# Patient Record
Sex: MALE | Race: BLACK OR AFRICAN AMERICAN | Employment: FULL TIME | URBAN - METROPOLITAN AREA
[De-identification: names, ages, dates, MRNs, and addresses within clinical notes are randomized per-mention and may not be internally consistent; named-entity substitution may affect disease eponyms.]

---

## 2018-12-31 ENCOUNTER — HOSPITAL ENCOUNTER (EMERGENCY)
Age: 48
Discharge: HOME | End: 2018-12-31
Attending: EMERGENCY MEDICINE
Payer: COMMERCIAL

## 2018-12-31 VITALS
SYSTOLIC BLOOD PRESSURE: 176 MMHG | DIASTOLIC BLOOD PRESSURE: 114 MMHG | WEIGHT: 170 LBS | TEMPERATURE: 97.8 F | HEART RATE: 74 BPM | HEIGHT: 69 IN | BODY MASS INDEX: 25.18 KG/M2 | RESPIRATION RATE: 18 BRPM | OXYGEN SATURATION: 97 %

## 2018-12-31 DIAGNOSIS — R04.0 EPISTAXIS: Primary | ICD-10-CM

## 2018-12-31 PROCEDURE — 77030013848 HC PK NSL EPITAX S&N -B

## 2018-12-31 PROCEDURE — 75810000284 HC CNTRL NASAL HEMORHRAGE SIMPLE: Performed by: NURSE PRACTITIONER

## 2018-12-31 PROCEDURE — 99284 EMERGENCY DEPT VISIT MOD MDM: CPT | Performed by: NURSE PRACTITIONER

## 2018-12-31 PROCEDURE — 74011250637 HC RX REV CODE- 250/637: Performed by: NURSE PRACTITIONER

## 2018-12-31 PROCEDURE — 74011000250 HC RX REV CODE- 250: Performed by: NURSE PRACTITIONER

## 2018-12-31 RX ORDER — CLONIDINE HYDROCHLORIDE 0.1 MG/1
0.1 TABLET ORAL
Status: COMPLETED | OUTPATIENT
Start: 2018-12-31 | End: 2018-12-31

## 2018-12-31 RX ORDER — HYDROCODONE BITARTRATE AND ACETAMINOPHEN 5; 325 MG/1; MG/1
1 TABLET ORAL
Qty: 12 TAB | Refills: 0 | Status: SHIPPED | OUTPATIENT
Start: 2018-12-31

## 2018-12-31 RX ORDER — HYDROCODONE BITARTRATE AND ACETAMINOPHEN 5; 325 MG/1; MG/1
1 TABLET ORAL
Status: COMPLETED | OUTPATIENT
Start: 2018-12-31 | End: 2018-12-31

## 2018-12-31 RX ORDER — PRAVASTATIN SODIUM 10 MG/1
TABLET ORAL
COMMUNITY

## 2018-12-31 RX ORDER — OXYMETAZOLINE HCL 0.05 %
2 SPRAY, NON-AEROSOL (ML) NASAL
Status: COMPLETED | OUTPATIENT
Start: 2018-12-31 | End: 2018-12-31

## 2018-12-31 RX ORDER — HYDROCHLOROTHIAZIDE 12.5 MG/1
12.5 CAPSULE ORAL DAILY
COMMUNITY

## 2018-12-31 RX ORDER — CARVEDILOL 25 MG/1
25 TABLET ORAL 2 TIMES DAILY WITH MEALS
COMMUNITY

## 2018-12-31 RX ORDER — FUROSEMIDE 40 MG/1
40 TABLET ORAL DAILY
COMMUNITY

## 2018-12-31 RX ORDER — GLUCOSAMINE SULFATE 1500 MG
POWDER IN PACKET (EA) ORAL DAILY
COMMUNITY

## 2018-12-31 RX ORDER — CEPHALEXIN 500 MG/1
500 CAPSULE ORAL 3 TIMES DAILY
Qty: 21 CAP | Refills: 0 | Status: SHIPPED | OUTPATIENT
Start: 2018-12-31 | End: 2019-01-07

## 2018-12-31 RX ADMIN — CLONIDINE HYDROCHLORIDE 0.1 MG: 0.1 TABLET ORAL at 13:26

## 2018-12-31 RX ADMIN — COCAINE HYDROCHLORIDE: 40 SOLUTION TOPICAL at 13:25

## 2018-12-31 RX ADMIN — HYDROCODONE BITARTRATE AND ACETAMINOPHEN 1 TABLET: 5; 325 TABLET ORAL at 14:17

## 2018-12-31 RX ADMIN — OXYMETAZOLINE HYDROCHLORIDE 2 SPRAY: 5 SPRAY NASAL at 13:21

## 2018-12-31 NOTE — ED NOTES
I have reviewed discharge instructions with the patient. The patient verbalized understanding. Patient left ED via Discharge Method: ambulatory to Home with his wife and daughter. Opportunity for questions and clarification provided. Patient given 2 scripts. To continue your aftercare when you leave the hospital, you may receive an automated call from our care team to check in on how you are doing. This is a free service and part of our promise to provide the best care and service to meet your aftercare needs.  If you have questions, or wish to unsubscribe from this service please call [phone]. Thank you for Choosing our [facility] Emergency Department.

## 2018-12-31 NOTE — ED PROVIDER NOTES
Patient states nose bleed this morning and his blood pressure was elevated. He states he got the bleeding to stop and took his blood pressure medication. He states approx 3-4 hours later he started having another nose bleed. He denies headache, changes to his vision, cough, congestion, blurry vision, and other acute symptoms. The history is provided by the patient. Past Medical History:  
Diagnosis Date  Hypertension History reviewed. No pertinent surgical history. History reviewed. No pertinent family history. Social History Socioeconomic History  Marital status:  Spouse name: Not on file  Number of children: Not on file  Years of education: Not on file  Highest education level: Not on file Social Needs  Financial resource strain: Not on file  Food insecurity - worry: Not on file  Food insecurity - inability: Not on file  Transportation needs - medical: Not on file  Transportation needs - non-medical: Not on file Occupational History  Not on file Tobacco Use  Smoking status: Current Every Day Smoker Substance and Sexual Activity  Alcohol use: Yes Comment: occ  Drug use: No  
 Sexual activity: Not on file Other Topics Concern  Not on file Social History Narrative  Not on file ALLERGIES: Patient has no known allergies. Review of Systems HENT: Positive for nosebleeds. Vitals:  
 12/31/18 1226 BP: (!) 163/114 Pulse: 83 Resp: 16 Temp: 98 °F (36.7 °C) SpO2: 98% Weight: 77.1 kg (170 lb) Height: 5' 9\" (1.753 m) Physical Exam  
Constitutional: He is oriented to person, place, and time. He appears well-developed and well-nourished. No distress. HENT:  
Head: Normocephalic and atraumatic. Nose: Epistaxis is observed. Neck: Normal range of motion. Neck supple. Cardiovascular: Normal rate and regular rhythm.   
Pulmonary/Chest: Effort normal and breath sounds normal.  
 Neurological: He is alert and oriented to person, place, and time. GCS eye subscore is 4. GCS verbal subscore is 5. GCS motor subscore is 6. Skin: Skin is warm and dry. He is not diaphoretic. Nursing note and vitals reviewed. 1:15 PM-discussed patient with Dr. Cuco Whitaker. MDM Number of Diagnoses or Management Options Diagnosis management comments: Discussed patient with Dr. Cuco Whitaker and she assisted with rhino rocket placement. No bleeding noted at discharge. Patient given po norco for pain while in the department. Patient given prescriptions for keflex and norco. Patient referred to ENT for rocker removal.  
 
  
Amount and/or Complexity of Data Reviewed Clinical lab tests: ordered and reviewed Discuss the patient with other providers: yes (Cuco Whitaker. ) Patient Progress Patient progress: stable Epistaxis Management Date/Time: 12/31/2018 1:54 PM 
Performed by: LAURA Reinoso Authorized by: LAURA Reinoso Consent:  
  Consent obtained:  Verbal 
  Consent given by:  Patient Risks discussed:  Bleeding Alternatives discussed:  No treatment Anesthesia (see MAR for exact dosages): Anesthesia method:  Topical application Topical anesthetic:  Cocaine Procedure details:  
  Treatment site:  L posterior Treatment method:  Nasal balloon Treatment episode: initial   
Post-procedure details:  
  Assessment:  Bleeding stopped Patient tolerance of procedure: Tolerated well, no immediate complications Comments:  
   4 cc in ballon 7.5mm rapid rhino assisted by Dr. Cuco Whitaker.

## 2018-12-31 NOTE — DISCHARGE INSTRUCTIONS
Leave the [employer] in place until you see ENT. You need to call Dr. Jaqueline Lindsay office to schedule an appointment for in the next 2-3 days to have it removed. You can return to the Emergency Department to have it removed if you are unable to get an appointment. Return sooner if you start bleeding again or for any additional concerns you may have.

## 2018-12-31 NOTE — ED TRIAGE NOTES
Pt states he had a nose bleed this morning and BP was elevated. Pt states he has taken BP medication this morning and nose is not bleeding at this time, but BP is still elevated.

## 2019-01-01 ENCOUNTER — HOSPITAL ENCOUNTER (EMERGENCY)
Age: 49
Discharge: HOME | End: 2019-01-01
Attending: EMERGENCY MEDICINE
Payer: COMMERCIAL

## 2019-01-01 VITALS
RESPIRATION RATE: 16 BRPM | DIASTOLIC BLOOD PRESSURE: 89 MMHG | WEIGHT: 170 LBS | HEART RATE: 67 BPM | BODY MASS INDEX: 25.18 KG/M2 | SYSTOLIC BLOOD PRESSURE: 139 MMHG | TEMPERATURE: 98.2 F | HEIGHT: 69 IN | OXYGEN SATURATION: 97 %

## 2019-01-01 DIAGNOSIS — R04.0 EPISTAXIS: Primary | ICD-10-CM

## 2019-01-01 DIAGNOSIS — I10 ESSENTIAL HYPERTENSION: ICD-10-CM

## 2019-01-01 LAB
ANION GAP SERPL CALC-SCNC: 9 MMOL/L
BASOPHILS # BLD: 0 K/UL (ref 0–0.2)
BASOPHILS NFR BLD: 1 % (ref 0–2)
BUN SERPL-MCNC: 17 MG/DL (ref 6–23)
CALCIUM SERPL-MCNC: 9.6 MG/DL (ref 8.3–10.4)
CHLORIDE SERPL-SCNC: 103 MMOL/L (ref 98–107)
CO2 SERPL-SCNC: 25 MMOL/L (ref 21–32)
CREAT SERPL-MCNC: 1.25 MG/DL (ref 0.8–1.5)
DIFFERENTIAL METHOD BLD: NORMAL
EOSINOPHIL # BLD: 0.2 K/UL (ref 0–0.8)
EOSINOPHIL NFR BLD: 3 % (ref 0.5–7.8)
ERYTHROCYTE [DISTWIDTH] IN BLOOD BY AUTOMATED COUNT: 13.2 % (ref 11.9–14.6)
GLUCOSE SERPL-MCNC: 113 MG/DL (ref 65–100)
HCT VFR BLD AUTO: 48.2 % (ref 41.1–50.3)
HGB BLD-MCNC: 15.9 G/DL (ref 13.6–17.2)
IMM GRANULOCYTES # BLD: 0 K/UL (ref 0–0.5)
IMM GRANULOCYTES NFR BLD AUTO: 0 % (ref 0–5)
LYMPHOCYTES # BLD: 2.3 K/UL (ref 0.5–4.6)
LYMPHOCYTES NFR BLD: 31 % (ref 13–44)
MCH RBC QN AUTO: 28.9 PG (ref 26.1–32.9)
MCHC RBC AUTO-ENTMCNC: 33 G/DL (ref 31.4–35)
MCV RBC AUTO: 87.6 FL (ref 79.6–97.8)
MONOCYTES # BLD: 0.5 K/UL (ref 0.1–1.3)
MONOCYTES NFR BLD: 7 % (ref 4–12)
NEUTS SEG # BLD: 4.4 K/UL (ref 1.7–8.2)
NEUTS SEG NFR BLD: 59 % (ref 43–78)
NRBC # BLD: 0 K/UL (ref 0–0.2)
PLATELET # BLD AUTO: 258 K/UL (ref 150–450)
PMV BLD AUTO: 10.1 FL (ref 9.4–12.3)
POTASSIUM SERPL-SCNC: 3.6 MMOL/L (ref 3.5–5.1)
RBC # BLD AUTO: 5.5 M/UL (ref 4.23–5.6)
SODIUM SERPL-SCNC: 137 MMOL/L (ref 136–145)
WBC # BLD AUTO: 7.5 K/UL (ref 4.3–11.1)

## 2019-01-01 PROCEDURE — 80048 BASIC METABOLIC PNL TOTAL CA: CPT

## 2019-01-01 PROCEDURE — 74011250636 HC RX REV CODE- 250/636: Performed by: EMERGENCY MEDICINE

## 2019-01-01 PROCEDURE — 85025 COMPLETE CBC W/AUTO DIFF WBC: CPT

## 2019-01-01 PROCEDURE — 99284 EMERGENCY DEPT VISIT MOD MDM: CPT | Performed by: EMERGENCY MEDICINE

## 2019-01-01 PROCEDURE — 96374 THER/PROPH/DIAG INJ IV PUSH: CPT | Performed by: EMERGENCY MEDICINE

## 2019-01-01 RX ORDER — LABETALOL HCL 20 MG/4 ML
20 SYRINGE (ML) INTRAVENOUS ONCE
Status: COMPLETED | OUTPATIENT
Start: 2019-01-01 | End: 2019-01-01

## 2019-01-01 RX ORDER — CLONIDINE HYDROCHLORIDE 0.1 MG/1
0.1 TABLET ORAL 2 TIMES DAILY
Qty: 20 TAB | Refills: 0 | Status: SHIPPED | OUTPATIENT
Start: 2019-01-01

## 2019-01-01 RX ADMIN — LABETALOL HYDROCHLORIDE 20 MG: 5 INJECTION, SOLUTION INTRAVENOUS at 10:49

## 2019-01-01 NOTE — ED TRIAGE NOTES
Pt was seen here yesterday for a nosebleed. Has Rocket in left nostril and is now having bleeding form right nostril.  
 
Chacha Salinas RN

## 2019-01-01 NOTE — DISCHARGE INSTRUCTIONS
High Blood Pressure: Care Instructions  Your Care Instructions    If your blood pressure is usually above 130/80, you have high blood pressure, or hypertension. That means the top number is 130 or higher or the bottom number is 80 or higher, or both. Despite what a lot of people think, high blood pressure usually doesn't cause headaches or make you feel dizzy or lightheaded. It usually has no symptoms. But it does increase your risk for heart attack, stroke, and kidney or eye damage. The higher your blood pressure, the more your risk increases. Your doctor will give you a goal for your blood pressure. Your goal will be based on your health and your age. Lifestyle changes, such as eating healthy and being active, are always important to help lower blood pressure. You might also take medicine to reach your blood pressure goal.  Follow-up care is a key part of your treatment and safety. Be sure to make and go to all appointments, and call your doctor if you are having problems. It's also a good idea to know your test results and keep a list of the medicines you take. How can you care for yourself at home? Medical treatment  · If you stop taking your medicine, your blood pressure will go back up. You may take one or more types of medicine to lower your blood pressure. Be safe with medicines. Take your medicine exactly as prescribed. Call your doctor if you think you are having a problem with your medicine. · Talk to your doctor before you start taking aspirin every day. Aspirin can help certain people lower their risk of a heart attack or stroke. But taking aspirin isn't right for everyone, because it can cause serious bleeding. · See your doctor regularly. You may need to see the doctor more often at first or until your blood pressure comes down. · If you are taking blood pressure medicine, talk to your doctor before you take decongestants or anti-inflammatory medicine, such as ibuprofen.  Some of these medicines can raise blood pressure. · Learn how to check your blood pressure at home. Lifestyle changes  · Stay at a healthy weight. This is especially important if you put on weight around the waist. Losing even 10 pounds can help you lower your blood pressure. · If your doctor recommends it, get more exercise. Walking is a good choice. Bit by bit, increase the amount you walk every day. Try for at least 30 minutes on most days of the week. You also may want to swim, bike, or do other activities. · Avoid or limit alcohol. Talk to your doctor about whether you can drink any alcohol. · Try to limit how much sodium you eat to less than 2,300 milligrams (mg) a day. Your doctor may ask you to try to eat less than 1,500 mg a day. · Eat plenty of fruits (such as bananas and oranges), vegetables, legumes, whole grains, and low-fat dairy products. · Lower the amount of saturated fat in your diet. Saturated fat is found in animal products such as milk, cheese, and meat. Limiting these foods may help you lose weight and also lower your risk for heart disease. · Do not smoke. Smoking increases your risk for heart attack and stroke. If you need help quitting, talk to your doctor about stop-smoking programs and medicines. These can increase your chances of quitting for good. When should you call for help? Call 911 anytime you think you may need emergency care. This may mean having symptoms that suggest that your blood pressure is causing a serious heart or blood vessel problem. Your blood pressure may be over 180/120.   For example, call 911 if:    · You have symptoms of a heart attack. These may include:  ? Chest pain or pressure, or a strange feeling in the chest.  ? Sweating. ? Shortness of breath. ? Nausea or vomiting. ? Pain, pressure, or a strange feeling in the back, neck, jaw, or upper belly or in one or both shoulders or arms. ? Lightheadedness or sudden weakness.   ? A fast or irregular heartbeat.     · You have symptoms of a stroke. These may include:  ? Sudden numbness, tingling, weakness, or loss of movement in your face, arm, or leg, especially on only one side of your body. ? Sudden vision changes. ? Sudden trouble speaking. ? Sudden confusion or trouble understanding simple statements. ? Sudden problems with walking or balance. ? A sudden, severe headache that is different from past headaches.     · You have severe back or belly pain.    Do not wait until your blood pressure comes down on its own. Get help right away.   Call your doctor now or seek immediate care if:    · Your blood pressure is much higher than normal (such as 180/120 or higher), but you don't have symptoms.     · You think high blood pressure is causing symptoms, such as:  ? Severe headache.  ? Blurry vision.    Watch closely for changes in your health, and be sure to contact your doctor if:    · Your blood pressure measures higher than your doctor recommends at least 2 times. That means the top number is higher or the bottom number is higher, or both.     · You think you may be having side effects from your blood pressure medicine. Where can you learn more? Go to http://jade-mikki.info/. Enter X731 in the search box to learn more about \"High Blood Pressure: Care Instructions. \"  Current as of: December 6, 2017  Content Version: 11.8  © 9941-0638 [employer]. Care instructions adapted under license by [employer] (which disclaims liability or warranty for this information). If you have questions about a medical condition or this instruction, always ask your healthcare professional. [place] [address] any warranty or liability for your use of this information. Nasal Packing: Care Instructions  Your Care Instructions  After a nose injury or surgery, gauze is packed high up into the nose. It soaks up fluids that drain from the nose, such as blood.  The doctor may change the gauze. Or he or she may leave it in place for a few days. Your face may look puffy. The skin near your eyes may be bruised. This may last for many days, but it will fade over time. Follow-up care is a key part of your treatment and safety. Be sure to make and go to all appointments, and call your doctor if you are having problems. It's also a good idea to know your test results and keep a list of the medicines you take. How can you care for yourself at home?    · Follow your doctor's advice for taking care of the packing. Your doctor may want to take it out at his or her office. Activity    · Avoid strenuous activities for 1 week or until your doctor says it is okay. These include bicycle riding, jogging, weight lifting, and aerobic exercise.     · You may drive when you are no longer taking prescription pain pills and feel up to it. Medicines    · Do not take aspirin, medicines that contain aspirin, or anti-inflammatory medicines such as ibuprofen (Advil, Motrin) or naproxen (Aleve) for 3 weeks after surgery unless your doctor says it is okay.     · Take pain medicines exactly as directed. ? If the doctor gave you a prescription medicine for pain, take it as prescribed.     · If your doctor prescribed antibiotics, take them as directed. Do not stop taking them just because you feel better. You need to take the full course of antibiotics.     · If you think your pain medicine is making you sick to your stomach:  ? Take your medicine after meals (unless your doctor has told you not to). ? Ask your doctor for a different pain medicine. Other instructions    · Do not blow your nose for 1 week after surgery.     · Do not put anything into your nose.     · If you must sneeze, open your mouth and sneeze naturally.     · After the packing is removed, use saline (saltwater) nasal washes to help keep your nasal passages open. This will wash out mucus and bacteria.  You can buy saline nose drops at a grocery store or drugstore. Or you can make your own at home. Add 1 teaspoon of salt and 1 teaspoon of baking soda to 2 cups of distilled water. If you make your own, fill a bulb syringe with the solution. Put the tip into your nostril, and squeeze gently. Mickiel Sickle your nose. When should you call for help? Call 911 anytime you think you may need emergency care. For example, call if:    · You passed out (lost consciousness).     · You have trouble breathing.     · You have sudden chest pain and shortness of breath, or you cough up blood.    Call your doctor now or seek immediate medical care if:    · You have symptoms of infection, such as:  ? Increased pain, swelling, warmth, or redness. ? Red streaks leading from the area. ? Pus draining from the area. ? A fever.     · You seem to be getting sicker.     · You have new pain or the pain gets worse.     · You have bleeding through the nasal packing that is not slowing.    Watch closely for changes in your health, and be sure to contact your doctor if:    · You do not get better as expected. Where can you learn more? Go to http://ajde-mikki.info/. Enter A097 in the search box to learn more about \"Nasal Packing: Care Instructions. \"  Current as of: March 28, 2018  Content Version: 11.8  © 9839-1528 [employer]. Care instructions adapted under license by [employer] (which disclaims liability or warranty for this information). If you have questions about a medical condition or this instruction, always ask your healthcare professional. [place] [address] any warranty or liability for your use of this information.

## 2019-01-01 NOTE — ED NOTES
I have reviewed discharge instructions with the patient. The patient verbalized understanding. Patient left ED via Discharge Method: ambulatory to Home [facility] for questions and clarification provided. Patient given 1 scripts. To continue your aftercare when you leave the hospital, you may receive an automated call from our care team to check in on how you are doing. This is a free service and part of our promise to provide the best care and service to meet your aftercare needs.  If you have questions, or wish to unsubscribe from this service please call [phone]. Thank you for Choosing our [facility] Emergency Department.

## 2019-01-01 NOTE — ED PROVIDER NOTES
Patient is a 25-year-old male who came in yesterday after having bleeding from his left nostril. He had a posterior Rhino Rocket placed. He is coming in today because he said some oozing from the right nostril. It's currently stopped. He does have a history of hypertension states he has been taking his home medications. Was given one extra dose of clonidine yesterday but not started on anything new. He denies taking any blood thinners except for an 81 mg aspirin. The history is provided by the patient. Epistaxis Past Medical History:  
Diagnosis Date  Hypertension History reviewed. No pertinent surgical history. History reviewed. No pertinent family history. Social History Socioeconomic History  Marital status:  Spouse name: Not on file  Number of children: Not on file  Years of education: Not on file  Highest education level: Not on file Social Needs  Financial resource strain: Not on file  Food insecurity - worry: Not on file  Food insecurity - inability: Not on file  Transportation needs - medical: Not on file  Transportation needs - non-medical: Not on file Occupational History  Not on file Tobacco Use  Smoking status: Current Every Day Smoker  Smokeless tobacco: Never Used Substance and Sexual Activity  Alcohol use: Yes Comment: occ  Drug use: No  
 Sexual activity: Not on file Other Topics Concern  Not on file Social History Narrative  Not on file ALLERGIES: Patient has no known allergies. Review of Systems Constitutional: Negative for chills and fever. Respiratory: Negative for chest tightness, shortness of breath, wheezing and stridor. Cardiovascular: Negative for chest pain and palpitations. Gastrointestinal: Negative for abdominal pain, diarrhea, nausea and vomiting. Skin: Negative for color change, pallor and wound. Neurological: Negative for weakness and numbness. All other systems reviewed and are negative. Vitals:  
 01/01/19 0933 01/01/19 1045 01/01/19 1108 BP: (!) 164/115 (!) 171/114 (!) 145/98 Pulse: 90 Resp: 16 Temp: 98 °F (36.7 °C) SpO2: 98% Weight: 77.1 kg (170 lb) Height: 5' 9\" (1.753 m) Physical Exam  
Constitutional: He is oriented to person, place, and time. He appears well-developed and well-nourished. No distress. HENT:  
Head: Normocephalic and atraumatic. Eyes: Conjunctivae are normal. No scleral icterus. Neck: No tracheal deviation present. Cardiovascular: Normal rate, regular rhythm and normal heart sounds. Pulmonary/Chest: Effort normal. No stridor. No respiratory distress. He has no wheezes. He has no rales. Abdominal: Soft. He exhibits no mass. There is no tenderness. There is no rebound and no guarding. Neurological: He is alert and oriented to person, place, and time. Skin: Skin is warm. Capillary refill takes less than 2 seconds. No rash noted. He is not diaphoretic. No erythema. No pallor. Psychiatric: He has a normal mood and affect. His behavior is normal.  
Nursing note and vitals reviewed. MDM Number of Diagnoses or Management Options Epistaxis:  
Essential hypertension:  
Diagnosis management comments: Patient has had no further bleeding while in the emergency department we have treated his blood pressure he is feeling better. I did add 1 mL of pressure to his left-sided [employer] he will continue to follow up with ENT. Ashlee Sena MD; 1/1/2019 @3:00 PM Voice dictation software was used during the making of this note. This software is not perfect and grammatical and other typographical errors may be present. This note has not been proofread for errors. 
=================================================================== Amount and/or Complexity of Data Reviewed Clinical lab tests: ordered and reviewed (Results for orders placed or performed during the hospital encounter of 01/01/19 
-CBC WITH AUTOMATED DIFF Result                      Value             Ref Range WBC                         7.5               4.3 - 11.1 K* 
     RBC                         5.50              4.23 - 5.6 M* HGB                         15.9              13.6 - 17.2 * HCT                         48.2              41.1 - 50.3 % MCV                         87.6              79.6 - 97.8 * MCH                         28.9              26.1 - 32.9 * MCHC                        33.0              31.4 - 35.0 * RDW                         13.2              11.9 - 14.6 % PLATELET                    258               150 - 450 K/* MPV                         10.1              9.4 - 12.3 FL ABSOLUTE NRBC               0.00              0.0 - 0.2 K/* DF                          AUTOMATED NEUTROPHILS                 59                43 - 78 % LYMPHOCYTES                 31                13 - 44 % MONOCYTES                   7                 4.0 - 12.0 % EOSINOPHILS                 3                 0.5 - 7.8 % BASOPHILS                   1                 0.0 - 2.0 % IMMATURE GRANULOCYTES       0                 0.0 - 5.0 %   
     ABS. NEUTROPHILS            4.4               1.7 - 8.2 K/* ABS. LYMPHOCYTES            2.3               0.5 - 4.6 K/* ABS. MONOCYTES              0.5               0.1 - 1.3 K/* ABS. EOSINOPHILS            0.2               0.0 - 0.8 K/* ABS. BASOPHILS              0.0               0.0 - 0.2 K/* ABS. IMM. GRANS.            0.0               0.0 - 0.5 K/* ) Procedures

## 2022-01-11 PROBLEM — I10 HYPERTENSION: Status: ACTIVE | Noted: 2022-01-11

## 2022-01-11 PROBLEM — Z86.79 HISTORY OF CHF (CONGESTIVE HEART FAILURE): Status: ACTIVE | Noted: 2022-01-11

## 2022-01-11 PROBLEM — R60.0 BILATERAL LOWER EXTREMITY EDEMA: Status: ACTIVE | Noted: 2022-01-11

## 2022-01-11 PROBLEM — E78.5 HYPERLIPIDEMIA: Status: ACTIVE | Noted: 2022-01-11

## 2022-03-19 PROBLEM — I10 HYPERTENSION: Status: ACTIVE | Noted: 2022-01-11

## 2022-03-19 PROBLEM — R60.0 BILATERAL LOWER EXTREMITY EDEMA: Status: ACTIVE | Noted: 2022-01-11

## 2022-03-20 PROBLEM — E78.5 HYPERLIPIDEMIA: Status: ACTIVE | Noted: 2022-01-11

## 2022-03-20 PROBLEM — Z86.79 HISTORY OF CHF (CONGESTIVE HEART FAILURE): Status: ACTIVE | Noted: 2022-01-11

## 2022-11-03 ENCOUNTER — HOSPITAL ENCOUNTER (EMERGENCY)
Dept: GENERAL RADIOLOGY | Age: 52
Discharge: HOME | End: 2022-11-06
Payer: COMMERCIAL

## 2022-11-03 ENCOUNTER — HOSPITAL ENCOUNTER (EMERGENCY)
Age: 52
Discharge: HOME | End: 2022-11-03
Attending: EMERGENCY MEDICINE
Payer: COMMERCIAL

## 2022-11-03 VITALS
TEMPERATURE: 97.9 F | DIASTOLIC BLOOD PRESSURE: 103 MMHG | OXYGEN SATURATION: 100 % | BODY MASS INDEX: 25.01 KG/M2 | WEIGHT: 165 LBS | HEART RATE: 94 BPM | SYSTOLIC BLOOD PRESSURE: 131 MMHG | RESPIRATION RATE: 15 BRPM | HEIGHT: 68 IN

## 2022-11-03 DIAGNOSIS — I50.9 ACUTE ON CHRONIC CONGESTIVE HEART FAILURE, UNSPECIFIED HEART FAILURE TYPE (HCC): ICD-10-CM

## 2022-11-03 DIAGNOSIS — R06.02 SHORTNESS OF BREATH: Primary | ICD-10-CM

## 2022-11-03 LAB
ALBUMIN SERPL-MCNC: 3 G/DL (ref 3.5–5)
ALBUMIN/GLOB SERPL: 0.9 {RATIO} (ref 0.4–1.6)
ALP SERPL-CCNC: 64 U/L (ref 50–136)
ALT SERPL-CCNC: 34 U/L (ref 12–65)
ANION GAP SERPL CALC-SCNC: 8 MMOL/L (ref 2–11)
AST SERPL-CCNC: 34 U/L (ref 15–37)
BASOPHILS # BLD: 0.1 K/UL (ref 0–0.2)
BASOPHILS NFR BLD: 1 % (ref 0–2)
BILIRUB SERPL-MCNC: 1.5 MG/DL (ref 0.2–1.1)
BUN SERPL-MCNC: 11 MG/DL (ref 6–23)
CALCIUM SERPL-MCNC: 8.4 MG/DL (ref 8.3–10.4)
CHLORIDE SERPL-SCNC: 112 MMOL/L (ref 101–110)
CO2 SERPL-SCNC: 19 MMOL/L (ref 21–32)
CREAT SERPL-MCNC: 1.03 MG/DL (ref 0.8–1.5)
DIFFERENTIAL METHOD BLD: NORMAL
EKG ATRIAL RATE: 88 BPM
EKG DIAGNOSIS: NORMAL
EKG P AXIS: 41 DEGREES
EKG P-R INTERVAL: 168 MS
EKG Q-T INTERVAL: 400 MS
EKG QRS DURATION: 110 MS
EKG QTC CALCULATION (BAZETT): 484 MS
EKG R AXIS: -1 DEGREES
EKG T AXIS: 94 DEGREES
EKG VENTRICULAR RATE: 88 BPM
EOSINOPHIL # BLD: 0.2 K/UL (ref 0–0.8)
EOSINOPHIL NFR BLD: 3 % (ref 0.5–7.8)
ERYTHROCYTE [DISTWIDTH] IN BLOOD BY AUTOMATED COUNT: 13 % (ref 11.9–14.6)
GLOBULIN SER CALC-MCNC: 3.5 G/DL (ref 2.8–4.5)
GLUCOSE SERPL-MCNC: 89 MG/DL (ref 65–100)
HCT VFR BLD AUTO: 41.4 % (ref 41.1–50.3)
HGB BLD-MCNC: 14 G/DL (ref 13.6–17.2)
IMM GRANULOCYTES # BLD AUTO: 0 K/UL (ref 0–0.5)
IMM GRANULOCYTES NFR BLD AUTO: 0 % (ref 0–5)
LYMPHOCYTES # BLD: 2.1 K/UL (ref 0.5–4.6)
LYMPHOCYTES NFR BLD: 32 % (ref 13–44)
MCH RBC QN AUTO: 30.1 PG (ref 26.1–32.9)
MCHC RBC AUTO-ENTMCNC: 33.8 G/DL (ref 31.4–35)
MCV RBC AUTO: 89 FL (ref 82–102)
MONOCYTES # BLD: 0.4 K/UL (ref 0.1–1.3)
MONOCYTES NFR BLD: 7 % (ref 4–12)
NEUTS SEG # BLD: 3.8 K/UL (ref 1.7–8.2)
NEUTS SEG NFR BLD: 58 % (ref 43–78)
NRBC # BLD: 0 K/UL (ref 0–0.2)
NT PRO BNP: 3404 PG/ML (ref 5–125)
PLATELET # BLD AUTO: 206 K/UL (ref 150–450)
PMV BLD AUTO: 10.9 FL (ref 9.4–12.3)
POTASSIUM SERPL-SCNC: 3.6 MMOL/L (ref 3.5–5.1)
PROT SERPL-MCNC: 6.5 G/DL (ref 6.3–8.2)
RBC # BLD AUTO: 4.65 M/UL (ref 4.23–5.6)
SODIUM SERPL-SCNC: 139 MMOL/L (ref 133–143)
WBC # BLD AUTO: 6.6 K/UL (ref 4.3–11.1)

## 2022-11-03 PROCEDURE — 99285 EMERGENCY DEPT VISIT HI MDM: CPT

## 2022-11-03 PROCEDURE — 71046 X-RAY EXAM CHEST 2 VIEWS: CPT

## 2022-11-03 PROCEDURE — 94664 DEMO&/EVAL PT USE INHALER: CPT

## 2022-11-03 PROCEDURE — 6360000002 HC RX W HCPCS: Performed by: EMERGENCY MEDICINE

## 2022-11-03 PROCEDURE — 94640 AIRWAY INHALATION TREATMENT: CPT

## 2022-11-03 PROCEDURE — 83880 ASSAY OF NATRIURETIC PEPTIDE: CPT

## 2022-11-03 PROCEDURE — 96374 THER/PROPH/DIAG INJ IV PUSH: CPT

## 2022-11-03 PROCEDURE — 94760 N-INVAS EAR/PLS OXIMETRY 1: CPT

## 2022-11-03 PROCEDURE — 6370000000 HC RX 637 (ALT 250 FOR IP): Performed by: EMERGENCY MEDICINE

## 2022-11-03 PROCEDURE — 85025 COMPLETE CBC W/AUTO DIFF WBC: CPT

## 2022-11-03 PROCEDURE — 80053 COMPREHEN METABOLIC PANEL: CPT

## 2022-11-03 RX ORDER — FUROSEMIDE 10 MG/ML
40 INJECTION INTRAMUSCULAR; INTRAVENOUS
Status: COMPLETED | OUTPATIENT
Start: 2022-11-03 | End: 2022-11-03

## 2022-11-03 RX ORDER — IPRATROPIUM BROMIDE AND ALBUTEROL SULFATE 2.5; .5 MG/3ML; MG/3ML
1 SOLUTION RESPIRATORY (INHALATION)
Status: COMPLETED | OUTPATIENT
Start: 2022-11-03 | End: 2022-11-03

## 2022-11-03 RX ADMIN — IPRATROPIUM BROMIDE AND ALBUTEROL SULFATE 1 AMPULE: .5; 3 SOLUTION RESPIRATORY (INHALATION) at 10:30

## 2022-11-03 RX ADMIN — FUROSEMIDE 40 MG: 10 INJECTION, SOLUTION INTRAMUSCULAR; INTRAVENOUS at 12:09

## 2022-11-03 ASSESSMENT — ENCOUNTER SYMPTOMS
ABDOMINAL PAIN: 0
VOMITING: 0
COUGH: 1
WHEEZING: 1
SHORTNESS OF BREATH: 1

## 2022-11-03 NOTE — DISCHARGE INSTRUCTIONS
Take 1 dose of Lasix when you get home. Starting tomorrow, take 2 Lasix pills daily for 3 days, Friday Saturday Sunday. Call your doctor for appointment recheck next week. Recheck sooner for shortness of breath or other concerns.

## 2022-11-03 NOTE — ED TRIAGE NOTES
Pt states that he has hx of COPD. Pt states that over the past 3-4 days, he's had increased shortness of breath. Pt saw his PCP who prescribed him an inhaler but the SOB has continued to worsen. Pt also endorses some heaviness in his chest and abdomen and has some mild edema in his legs.

## 2022-11-03 NOTE — ED PROVIDER NOTES
Emergency Department Provider Note                   PCP:                No primary care provider on file. Age: 45 y.o. Sex: male     No diagnosis found. DISPOSITION          MDM  Number of Diagnoses or Management Options  Diagnosis management comments: Increasing shortness of breath for 4 days. Possibility exacerbation of COPD. Potential for congestive failure. Check chest x-ray also for pneumonia or effusion. Nebulizer treatment. Screening lab work. Recheck blood pressure       Amount and/or Complexity of Data Reviewed  Independent visualization of images, tracings, or specimens: yes (Potation of EKG shows normal sinus rhythm at 88. Left ventricular hypertrophy. T wave inversion laterally. No ST changes. No ectopy. Normal QT interval.)    Risk of Complications, Morbidity, and/or Mortality  Presenting problems: moderate  Diagnostic procedures: minimal  Management options: low                 No orders of the defined types were placed in this encounter. Medications - No data to display    New Prescriptions    No medications on file        Davonte Barrett is a 45 y.o. male who presents to the Emergency Department with chief complaint of    Chief Complaint   Patient presents with    Shortness of Breath      26-year-old male presents with 4-day history increasing shortness of breath. Cough with clear sputum. No chest pain. Has noted some wheezing. Positive slight swelling in his legs. No bleeding fever chills no chest pain. Went by her primary care doctor yesterday and was given a Anoro inhaler which has not helped. History of hypertension. Also history of COPD. Patient states he does have some issues with congestive heart failure. Has been compliant with medications. The history is provided by the patient.    Shortness of Breath  Severity:  Moderate  Onset quality:  Gradual  Duration:  4 days  Timing:  Constant  Progression:  Worsening  Chronicity:  New  Relieved by:  Nothing  Worsened by:  Nothing  Ineffective treatments:  Inhaler  Associated symptoms: cough and wheezing    Associated symptoms: no abdominal pain, no chest pain, no fever, no neck pain and no vomiting        Review of Systems   Constitutional:  Negative for chills and fever. Respiratory:  Positive for cough, shortness of breath and wheezing. Cardiovascular:  Positive for leg swelling. Negative for chest pain. Gastrointestinal:  Negative for abdominal pain and vomiting. Musculoskeletal:  Negative for neck pain. All other systems reviewed and are negative. Past Medical History:   Diagnosis Date    COPD (chronic obstructive pulmonary disease) ([facility] [address]     Hypertension         History reviewed. No pertinent surgical history. History reviewed. No pertinent family history. Social History     Socioeconomic History    Marital status:      Spouse name: None    Number of children: None    Years of education: None    Highest education level: None   Tobacco Use    Smoking status: Every Day    Smokeless tobacco: Never   Substance and Sexual Activity    Alcohol use: Yes    Drug use: No         Lisinopril     Previous Medications    CARVEDILOL (COREG) 25 MG TABLET    Take 25 mg by mouth 2 times daily (with meals)    CLONIDINE (CATAPRES) 0.1 MG TABLET    Take 0.1 mg by mouth 2 times daily    FUROSEMIDE (LASIX) 40 MG TABLET    Take 40 mg by mouth daily    HYDROCHLOROTHIAZIDE (MICROZIDE) 12.5 MG CAPSULE    Take 12.5 mg by mouth daily    HYDROCODONE-ACETAMINOPHEN (NORCO) 5-325 MG PER TABLET    Take 1 tablet by mouth every 4 hours as needed. OLOPATADINE (PATADAY) 0.2 % SOLN OPHTHALMIC SOLUTION    Apply 1 drop to eye daily    PRAVASTATIN (PRAVACHOL) 10 MG TABLET    Take by mouth    VITAMIN D 25 MCG (1000 UT) CAPS    Take by mouth daily        Vitals signs and nursing note reviewed.    Patient Vitals for the past 4 hrs:   Temp Pulse Resp BP SpO2   11/03/22 0942 97.9 °F (36.6 °C) (!) 103 18 (!) 131/103 94 %          Physical Exam  Vitals and nursing note reviewed. Constitutional:       Appearance: He is not ill-appearing. HENT:      Head: Normocephalic and atraumatic. Right Ear: External ear normal.      Left Ear: External ear normal.      Mouth/Throat:      Mouth: Mucous membranes are moist.      Pharynx: Oropharynx is clear. Eyes:      General: No scleral icterus. Extraocular Movements: Extraocular movements intact. Pupils: Pupils are equal, round, and reactive to light. Cardiovascular:      Rate and Rhythm: Normal rate and regular rhythm. Pulmonary:      Effort: Pulmonary effort is normal.      Breath sounds: Decreased breath sounds and wheezing present. No rales. Abdominal:      Palpations: Abdomen is soft. Tenderness: There is no abdominal tenderness. Musculoskeletal:         General: No swelling or tenderness. Right lower leg: No tenderness. Edema present. Left lower leg: No tenderness. Edema present. Skin:     General: Skin is warm and dry. Neurological:      General: No focal deficit present. Mental Status: He is alert. Procedures    No results found for any visits on 11/03/22.      No orders to display             Results Include:    Recent Results (from the past 24 hour(s))   CBC with Auto Differential    Collection Time: 11/03/22 10:12 AM   Result Value Ref Range    WBC 6.6 4.3 - 11.1 K/uL    RBC 4.65 4.23 - 5.6 M/uL    Hemoglobin 14.0 13.6 - 17.2 g/dL    Hematocrit 41.4 41.1 - 50.3 %    MCV 89.0 82.0 - 102.0 FL    MCH 30.1 26.1 - 32.9 PG    MCHC 33.8 31.4 - 35.0 g/dL    RDW 13.0 11.9 - 14.6 %    Platelets 589 909 - 402 K/uL    MPV 10.9 9.4 - 12.3 FL    nRBC 0.00 0.0 - 0.2 K/uL    Differential Type AUTOMATED      Seg Neutrophils 58 43 - 78 %    Lymphocytes 32 13 - 44 %    Monocytes 7 4.0 - 12.0 %    Eosinophils % 3 0.5 - 7.8 %    Basophils 1 0.0 - 2.0 %    Immature Granulocytes 0 0.0 - 5.0 %    Segs Absolute 3.8 1.7 - 8.2 K/UL    Absolute Lymph # 2.1 0.5 - 4.6 K/UL    Absolute Mono # 0.4 0.1 - 1.3 K/UL    Absolute Eos # 0.2 0.0 - 0.8 K/UL    Basophils Absolute 0.1 0.0 - 0.2 K/UL    Absolute Immature Granulocyte 0.0 0.0 - 0.5 K/UL   Comprehensive Metabolic Panel    Collection Time: 11/03/22 10:12 AM   Result Value Ref Range    Sodium 139 133 - 143 mmol/L    Potassium 3.6 3.5 - 5.1 mmol/L    Chloride 112 (H) 101 - 110 mmol/L    CO2 19 (L) 21 - 32 mmol/L    Anion Gap 8 2 - 11 mmol/L    Glucose 89 65 - 100 mg/dL    BUN 11 6 - 23 MG/DL    Creatinine 1.03 0.8 - 1.5 MG/DL    Est, Glom Filt Rate >60 >60 ml/min/1.73m2    Calcium 8.4 8.3 - 10.4 MG/DL    Total Bilirubin 1.5 (H) 0.2 - 1.1 MG/DL    ALT 34 12 - 65 U/L    AST 34 15 - 37 U/L    Alk Phosphatase 64 50 - 136 U/L    Total Protein 6.5 6.3 - 8.2 g/dL    Albumin 3.0 (L) 3.5 - 5.0 g/dL    Globulin 3.5 2.8 - 4.5 g/dL    Albumin/Globulin Ratio 0.9 0.4 - 1.6     Brain Natriuretic Peptide    Collection Time: 11/03/22 10:12 AM   Result Value Ref Range    NT Pro-BNP 3,404 (H) 5 - 125 PG/ML     Results Include:    Recent Results (from the past 24 hour(s))   CBC with Auto Differential    Collection Time: 11/03/22 10:12 AM   Result Value Ref Range    WBC 6.6 4.3 - 11.1 K/uL    RBC 4.65 4.23 - 5.6 M/uL    Hemoglobin 14.0 13.6 - 17.2 g/dL    Hematocrit 41.4 41.1 - 50.3 %    MCV 89.0 82.0 - 102.0 FL    MCH 30.1 26.1 - 32.9 PG    MCHC 33.8 31.4 - 35.0 g/dL    RDW 13.0 11.9 - 14.6 %    Platelets 582 734 - 117 K/uL    MPV 10.9 9.4 - 12.3 FL    nRBC 0.00 0.0 - 0.2 K/uL    Differential Type AUTOMATED      Seg Neutrophils 58 43 - 78 %    Lymphocytes 32 13 - 44 %    Monocytes 7 4.0 - 12.0 %    Eosinophils % 3 0.5 - 7.8 %    Basophils 1 0.0 - 2.0 %    Immature Granulocytes 0 0.0 - 5.0 %    Segs Absolute 3.8 1.7 - 8.2 K/UL    Absolute Lymph # 2.1 0.5 - 4.6 K/UL    Absolute Mono # 0.4 0.1 - 1.3 K/UL    Absolute Eos # 0.2 0.0 - 0.8 K/UL    Basophils Absolute 0.1 0.0 - 0.2 K/UL    Absolute Immature Granulocyte 0.0 0.0 - 0.5 K/UL Comprehensive Metabolic Panel    Collection Time: 11/03/22 10:12 AM   Result Value Ref Range    Sodium 139 133 - 143 mmol/L    Potassium 3.6 3.5 - 5.1 mmol/L    Chloride 112 (H) 101 - 110 mmol/L    CO2 19 (L) 21 - 32 mmol/L    Anion Gap 8 2 - 11 mmol/L    Glucose 89 65 - 100 mg/dL    BUN 11 6 - 23 MG/DL    Creatinine 1.03 0.8 - 1.5 MG/DL    Est, Glom Filt Rate >60 >60 ml/min/1.73m2    Calcium 8.4 8.3 - 10.4 MG/DL    Total Bilirubin 1.5 (H) 0.2 - 1.1 MG/DL    ALT 34 12 - 65 U/L    AST 34 15 - 37 U/L    Alk Phosphatase 64 50 - 136 U/L    Total Protein 6.5 6.3 - 8.2 g/dL    Albumin 3.0 (L) 3.5 - 5.0 g/dL    Globulin 3.5 2.8 - 4.5 g/dL    Albumin/Globulin Ratio 0.9 0.4 - 1.6     Brain Natriuretic Peptide    Collection Time: 11/03/22 10:12 AM   Result Value Ref Range    NT Pro-BNP 3,404 (H) 5 - 125 PG/ML     XR CHEST (2 VW)    Result Date: 11/3/2022  TWO-VIEW CHEST: CLINICAL HISTORY: Worsening shortness of breath for 3-4 days with a history of COPD. COMPARISON:  None. FINDINGS: PA and lateral chest images demonstrate mild cardiomegaly with pulmonary venous congestion, perihilar edema, and small pleural effusions in a pattern suspicious for mild congestive heart failure. No maryam pneumonic consolidation or pneumothorax. The heart size is within normal limits without evidence of congestive heart failure or pneumothorax. The bony thorax appears intact on these views. There are overlying radiopaque support devices. CARDIOMEGALY WITH FINDINGS SUSPICIOUS FOR MILD CONGESTIVE HEART FAILURE. Symptoms appear to be more due to congestive failure as opposed to COPD exacerbation. IV Lasix. Discharged home in increased dose of Lasix and inhaler. Voice dictation software was used during the making of this note. This software is not perfect and grammatical and other typographical errors may be present. This note has not been completely proofread for errors.      Sara Hooker MD  11/03/22 [address] [place] [address] Nancy Velazquez MD  11/03/22 1149

## 2022-11-03 NOTE — ED NOTES
I have reviewed discharge instructions with the patient and spouse. The patient and spouse verbalized understanding. Patient left ED via Discharge Method: ambulatory to Home with self and spouse. Opportunity for questions and clarification provided. Patient given 0 scripts. To continue your aftercare when you leave the hospital, you may receive an automated call from our care team to check in on how you are doing. This is a free service and part of our promise to provide the best care and service to meet your aftercare needs.  If you have questions, or wish to unsubscribe from this service please call [phone]. Thank you for Choosing our [facility] Emergency Department.         Norma Rodrigues RN  11/03/22 7036

## 2022-12-29 ENCOUNTER — OFFICE VISIT (OUTPATIENT)
Dept: CARDIOLOGY CLINIC | Age: 52
End: 2022-12-29
Payer: COMMERCIAL

## 2022-12-29 VITALS
HEART RATE: 104 BPM | BODY MASS INDEX: 22.58 KG/M2 | HEIGHT: 68 IN | DIASTOLIC BLOOD PRESSURE: 80 MMHG | WEIGHT: 149 LBS | SYSTOLIC BLOOD PRESSURE: 122 MMHG

## 2022-12-29 DIAGNOSIS — I50.9 CONGESTIVE HEART FAILURE, UNSPECIFIED HF CHRONICITY, UNSPECIFIED HEART FAILURE TYPE (HCC): ICD-10-CM

## 2022-12-29 DIAGNOSIS — I50.9 CONGESTIVE HEART FAILURE, UNSPECIFIED HF CHRONICITY, UNSPECIFIED HEART FAILURE TYPE (HCC): Primary | ICD-10-CM

## 2022-12-29 DIAGNOSIS — E78.5 HYPERLIPIDEMIA, UNSPECIFIED HYPERLIPIDEMIA TYPE: ICD-10-CM

## 2022-12-29 DIAGNOSIS — R07.89 ATYPICAL CHEST PAIN: ICD-10-CM

## 2022-12-29 DIAGNOSIS — I10 HYPERTENSION, UNSPECIFIED TYPE: ICD-10-CM

## 2022-12-29 LAB
ANION GAP SERPL CALC-SCNC: 8 MMOL/L (ref 2–11)
BUN SERPL-MCNC: 15 MG/DL (ref 6–23)
CALCIUM SERPL-MCNC: 8.9 MG/DL (ref 8.3–10.4)
CHLORIDE SERPL-SCNC: 111 MMOL/L (ref 101–110)
CO2 SERPL-SCNC: 23 MMOL/L (ref 21–32)
CREAT SERPL-MCNC: 1.3 MG/DL (ref 0.8–1.5)
GLUCOSE SERPL-MCNC: 80 MG/DL (ref 65–100)
MAGNESIUM SERPL-MCNC: 1.8 MG/DL (ref 1.8–2.4)
POTASSIUM SERPL-SCNC: 3.4 MMOL/L (ref 3.5–5.1)
SODIUM SERPL-SCNC: 142 MMOL/L (ref 133–143)

## 2022-12-29 PROCEDURE — 3074F SYST BP LT 130 MM HG: CPT | Performed by: INTERNAL MEDICINE

## 2022-12-29 PROCEDURE — 99214 OFFICE O/P EST MOD 30 MIN: CPT | Performed by: INTERNAL MEDICINE

## 2022-12-29 PROCEDURE — 3079F DIAST BP 80-89 MM HG: CPT | Performed by: INTERNAL MEDICINE

## 2022-12-29 RX ORDER — AMLODIPINE BESYLATE 10 MG/1
TABLET ORAL
COMMUNITY

## 2022-12-29 RX ORDER — SPIRONOLACTONE 25 MG/1
TABLET ORAL
COMMUNITY
Start: 2022-11-30

## 2022-12-29 RX ORDER — POTASSIUM CHLORIDE 20 MEQ/1
20 TABLET, EXTENDED RELEASE ORAL 2 TIMES DAILY
Qty: 180 TABLET | Refills: 3 | Status: SHIPPED | OUTPATIENT
Start: 2022-12-29

## 2022-12-29 RX ORDER — FUROSEMIDE 40 MG/1
40 TABLET ORAL DAILY
Qty: 90 TABLET | Refills: 3 | Status: SHIPPED | OUTPATIENT
Start: 2022-12-29

## 2022-12-29 RX ORDER — FUROSEMIDE 20 MG/1
TABLET ORAL
COMMUNITY
Start: 2022-11-30 | End: 2022-12-29 | Stop reason: ALTCHOICE

## 2022-12-29 NOTE — PROGRESS NOTES
[facility] CARDIOLOGY Follow Up                 Reason for Visit: History of CHF    Subjective:     Patient is a 45 y.o. male with a PMH of hypertension, CHF, and hyperlipidemia who presents for follow-up. The patient was last seen in January 2022. An echocardiogram was ordered; however, he was a no-show to his appointment. Medical records were requested from his prior cardiologist; however, medical records are not readily apparent in the EMR. The patient presented to the ED on November 3 with shortness of breath. His BNP was noted to be 3404. The patient reports having lower extremity edema and orthopnea. He reports left-sided chest pain yesterday. The patient does not report any alleviating or aggravating factors for the chest pain when it occurs. He reports having an LHC in [place] years ago showed \"no blockage\". He reports that he sees her PCP every 6 months. The patient also reports having had an echocardiogram in the last 2 months. Past Medical History:   Diagnosis Date    COPD (chronic obstructive pulmonary disease) ([facility] [address]     Hypertension       No past surgical history on file. No family history on file. Social History     Tobacco Use    Smoking status: Every Day    Smokeless tobacco: Never   Substance Use Topics    Alcohol use: Yes      Allergies   Allergen Reactions    Lisinopril Other (See Comments)         ROS:  No obvious pertinent positives on review of systems except for what was outlined above.        Objective:       /80   Pulse (!) 104   Ht 5' 8\" (1.727 m)   Wt 149 lb (67.6 kg)   BMI 22.66 kg/m²     BP Readings from Last 3 Encounters:   12/29/22 122/80   11/03/22 (!) 131/103   01/11/22 (!) 138/90       Wt Readings from Last 3 Encounters:   12/29/22 149 lb (67.6 kg)   11/03/22 165 lb (74.8 kg)   01/11/22 154 lb (69.9 kg)       General/Constitutional:   Alert and oriented x 3, no acute distress  HEENT:   normocephalic, atraumatic, moist mucous membranes  Neck:   No JVD or carotid bruits bilaterally  Cardiovascular:   Tachycardia, regular, no rub/gallop appreciated  Pulmonary:   clear to auscultation bilaterally, no respiratory distress  Abdomen:   soft, non-tender, non-distended  Ext:   2+ LE edema bilaterally  Skin:  warm and dry, no obvious rashes seen  Neuro:   no obvious sensory or motor deficits  Psychiatric:   normal mood and affect    Data Review:   No results found for: CHOL  No results found for: TRIG  No results found for: HDL  No results found for: LDLCHOLESTEROL, LDLCALC  No results found for: LABVLDL, VLDL  No results found for: [facility]     Lab Results   Component Value Date/Time     11/03/2022 10:12 AM    K 3.6 11/03/2022 10:12 AM     11/03/2022 10:12 AM    CO2 19 11/03/2022 10:12 AM    BUN 11 11/03/2022 10:12 AM    CREATININE 1.03 11/03/2022 10:12 AM    GLUCOSE 89 11/03/2022 10:12 AM    CALCIUM 8.4 11/03/2022 10:12 AM         Lab Results   Component Value Date    ALT 34 11/03/2022    AST 34 11/03/2022        Assessment/Plan:   1. Congestive heart failure, unspecified HF chronicity, unspecified heart failure type ([facility] [address]  - Obtain an echocardiogram   - Patient with LE edema, orthopnea, SOB and BNP ~3400 on November 3 consistent with CHF  - Unsuccessful attempt to obtain record of prior TTE at [facility], and patient a no-show to his prior TTE appointment  - Obtain medical records of recent TTE (patient reports having had one in the last two months) as well as TTE at [facility] years ago  - Currently on Aldactone    2. Atypical chest pain  - Obtain an MPI  - PE unlikely per PE Wells score     3. Hyperlipidemia, unspecified hyperlipidemia type  - Continue with pravastatin    4.  Hypertension, unspecified type  - Well controlled  - Currently on amlodipine and Aldactone    F/U: 3 months    Amy Bustillo MD

## 2022-12-30 ENCOUNTER — TELEPHONE (OUTPATIENT)
Dept: CARDIOLOGY CLINIC | Age: 52
End: 2022-12-30

## 2022-12-30 NOTE — TELEPHONE ENCOUNTER
Informed pt's wife, Gleda Holstein of Dr. Ta Negro response. Lab order in computer, no appt needed, KCL script sent to [facility] on [address] voiced understanding.  cgh

## 2022-12-30 NOTE — TELEPHONE ENCOUNTER
----- Message from Vanessa Erazo MD sent at 12/29/2022  8:30 PM EST -----  Please let the patient know that his lab work is acceptable for increasing his PO Lasix to 40 mg daily. He has mild hypokalemia, so I added 20 mEq potassium supplementation BID. I also ordered a repeat BMP and Mg level in one week. Please let him know he can  his medications from the pharmacy.

## 2023-01-06 ENCOUNTER — TELEPHONE (OUTPATIENT)
Dept: CARDIOLOGY CLINIC | Age: 53
End: 2023-01-06

## 2023-01-06 DIAGNOSIS — I50.9 CONGESTIVE HEART FAILURE, UNSPECIFIED HF CHRONICITY, UNSPECIFIED HEART FAILURE TYPE (HCC): ICD-10-CM

## 2023-01-06 LAB
ANION GAP SERPL CALC-SCNC: 9 MMOL/L (ref 2–11)
BUN SERPL-MCNC: 15 MG/DL (ref 6–23)
CALCIUM SERPL-MCNC: 7.4 MG/DL (ref 8.3–10.4)
CHLORIDE SERPL-SCNC: 109 MMOL/L (ref 101–110)
CO2 SERPL-SCNC: 24 MMOL/L (ref 21–32)
CREAT SERPL-MCNC: 1.3 MG/DL (ref 0.8–1.5)
GLUCOSE SERPL-MCNC: 84 MG/DL (ref 65–100)
MAGNESIUM SERPL-MCNC: 1.8 MG/DL (ref 1.8–2.4)
POTASSIUM SERPL-SCNC: 3.6 MMOL/L (ref 3.5–5.1)
SODIUM SERPL-SCNC: 142 MMOL/L (ref 133–143)

## 2023-01-06 NOTE — TELEPHONE ENCOUNTER
----- Message from General Ronaldo MD sent at 1/6/2023  1:46 PM EST -----  Please let the patient know that his potassium level has normalized on potassium supplementation. No new changes to medical therapy at this time.

## 2023-01-06 NOTE — TELEPHONE ENCOUNTER
Per [facility] Robertson in [place] [address], today's echo showed EF-15%. Dr. Benita Anderson asks to see patient within 7-14 days. Patient may be added at 4:45 pm, if no appointments available. Advised wife, Ivana Ray, of above. Cancelled 1/9/23 stress test. Scheduled 1/9/23 4:45 pm SA appointment with Dr. Benita Anderson, since next available appointment with Dr. Benita Anderson is 2/1/23. Apollo verbalized understanding.

## 2023-01-08 NOTE — PROGRESS NOTES
[facility] CARDIOLOGY Follow Up                 Reason for Visit: HFrEF    Subjective:     Patient is a 45 y.o. male with a PMH of HFrEF (long history of CHF though vague; self-reported NICM), hypertension, and hyperlipidemia who presents for follow-up. The patient was last seen in December 2022. A TTE was ordered. Medical records were requested of a prior TTE performed at [facility] years ago. An MPI was ordered for atypical chest pain. The patient had a TTE on January 6 that was noted to demonstrate an EF of 10 to 15%, moderately dilated LV, and RV dysfunction. The patient had blood work on December 29, and his lab work was acceptable for increasing his p.o. Lasix to 40 mg daily. He was noted to have mild hypokalemia, so potassium supplementation was added. Repeat chemistry profile was ordered. His potassium level normalized on potassium supplementation. The patient reports having a history of a dry cough with lisinopril. He reports that he has had CHF for the last 8 years. He reports that he had an LHC in or around the year 2016 in [place], [place]. Past Medical History:   Diagnosis Date    COPD (chronic obstructive pulmonary disease) ([facility] [address]     Hypertension       No past surgical history on file. No family history on file. Social History     Tobacco Use    Smoking status: Every Day    Smokeless tobacco: Never   Substance Use Topics    Alcohol use: Yes      Allergies   Allergen Reactions    Lisinopril Other (See Comments)         ROS:  No obvious pertinent positives on review of systems except for what was outlined above.        Objective:       BP (!) 128/98   Pulse 76   Ht 5' 8\" (1.727 m)   Wt 143 lb (64.9 kg)   BMI 21.74 kg/m²     BP Readings from Last 3 Encounters:   01/09/23 (!) 128/98   01/06/23 (!) 136/90   12/29/22 122/80       Wt Readings from Last 3 Encounters:   01/09/23 143 lb (64.9 kg)   01/06/23 149 lb (67.6 kg)   12/29/22 149 lb (67.6 kg)       General/Constitutional:   Alert and oriented x 3, no acute distress  HEENT:   normocephalic, atraumatic, moist mucous membranes  Neck:   No JVD or carotid bruits bilaterally  Cardiovascular:   regular rate and rhythm, no rub/gallop appreciated  Pulmonary:   clear to auscultation bilaterally, no respiratory distress  Abdomen:   soft, non-tender, non-distended  Ext:   2+ LE edema bilaterally  Skin:  warm and dry, no obvious rashes seen  Neuro:   no obvious sensory or motor deficits  Psychiatric:   normal mood and affect    Data Review:   No results found for: CHOL  No results found for: TRIG  No results found for: HDL  No results found for: LDLCHOLESTEROL, LDLCALC  No results found for: LABVLDL, VLDL  No results found for: [facility]     Lab Results   Component Value Date/Time     01/06/2023 08:36 AM     12/29/2022 03:11 PM     11/03/2022 10:12 AM    K 3.6 01/06/2023 08:36 AM    K 3.4 12/29/2022 03:11 PM    K 3.6 11/03/2022 10:12 AM     01/06/2023 08:36 AM     12/29/2022 03:11 PM     11/03/2022 10:12 AM    CO2 24 01/06/2023 08:36 AM    CO2 23 12/29/2022 03:11 PM    CO2 19 11/03/2022 10:12 AM    BUN 15 01/06/2023 08:36 AM    BUN 15 12/29/2022 03:11 PM    BUN 11 11/03/2022 10:12 AM    CREATININE 1.30 01/06/2023 08:36 AM    CREATININE 1.30 12/29/2022 03:11 PM    CREATININE 1.03 11/03/2022 10:12 AM    GLUCOSE 84 01/06/2023 08:36 AM    GLUCOSE 80 12/29/2022 03:11 PM    GLUCOSE 89 11/03/2022 10:12 AM    CALCIUM 7.4 01/06/2023 08:36 AM    CALCIUM 8.9 12/29/2022 03:11 PM    CALCIUM 8.4 11/03/2022 10:12 AM         Lab Results   Component Value Date    ALT 34 11/03/2022    AST 34 11/03/2022        Assessment/Plan:   1. Chronic HFrEF (heart failure with reduced ejection fraction) ([facility] [address]  - Patient reports having a history of CHF for at least the last 8 years; however, it is unclear what his EF has been historically  - Start Jardiance 10 mg daily  - Continue with p.o.  Lasix and Aldactone   - Start Toprol-XL 25 mg daily  - Start Entresto 24-26 mg twice daily  - Decrease potassium chloride to 10 mEq twice daily since ARNI is added to MRA  - Obtain a chemistry profile in 1 week  - Obtain medical records of a prior LHC (patient reports a history of NICM)  - Obtain medical records of a prior TTE to help guide timing of ICD consideration  - Instructed the patient to take an extra dose of Lasix for weight gain of 2 lbs in two days or 5 lbs in one week and return to original dose once the weight returns to baseline    2. Hypertension, unspecified type  - See \"chronic HFrEF\" for GDMT  - Discontinue amlodipine    3.  Hyperlipidemia, unspecified hyperlipidemia type  - Continue with pravastatin    F/U: 4 weeks    Albino Ribera MD

## 2023-01-09 ENCOUNTER — TELEPHONE (OUTPATIENT)
Dept: CARDIOLOGY CLINIC | Age: 53
End: 2023-01-09

## 2023-01-09 ENCOUNTER — OFFICE VISIT (OUTPATIENT)
Dept: CARDIOLOGY CLINIC | Age: 53
End: 2023-01-09
Payer: COMMERCIAL

## 2023-01-09 VITALS
DIASTOLIC BLOOD PRESSURE: 98 MMHG | HEIGHT: 68 IN | BODY MASS INDEX: 21.67 KG/M2 | SYSTOLIC BLOOD PRESSURE: 128 MMHG | HEART RATE: 76 BPM | WEIGHT: 143 LBS

## 2023-01-09 DIAGNOSIS — I10 HYPERTENSION, UNSPECIFIED TYPE: ICD-10-CM

## 2023-01-09 DIAGNOSIS — I50.22 CHRONIC HFREF (HEART FAILURE WITH REDUCED EJECTION FRACTION) (HCC): Primary | ICD-10-CM

## 2023-01-09 DIAGNOSIS — E78.5 HYPERLIPIDEMIA, UNSPECIFIED HYPERLIPIDEMIA TYPE: ICD-10-CM

## 2023-01-09 PROCEDURE — 3074F SYST BP LT 130 MM HG: CPT | Performed by: INTERNAL MEDICINE

## 2023-01-09 PROCEDURE — 99214 OFFICE O/P EST MOD 30 MIN: CPT | Performed by: INTERNAL MEDICINE

## 2023-01-09 PROCEDURE — 3080F DIAST BP >= 90 MM HG: CPT | Performed by: INTERNAL MEDICINE

## 2023-01-09 RX ORDER — METOPROLOL SUCCINATE 25 MG/1
25 TABLET, EXTENDED RELEASE ORAL DAILY
Qty: 90 TABLET | Refills: 3 | Status: SHIPPED | OUTPATIENT
Start: 2023-01-09

## 2023-01-09 RX ORDER — POTASSIUM CHLORIDE 750 MG/1
10 TABLET, EXTENDED RELEASE ORAL 2 TIMES DAILY
Qty: 180 TABLET | Refills: 3 | Status: SHIPPED | OUTPATIENT
Start: 2023-01-09

## 2023-01-09 NOTE — TELEPHONE ENCOUNTER
----- Message from Jess Ramirez MD sent at 1/8/2023  9:31 AM EST -----  Please let the patient know his EF is severely depressed with evidence of RV dysfunction. We will further discuss at his impending appointment.

## 2023-01-09 NOTE — PROGRESS NOTES
Jardiance 10mg: Samples of this drug were given to the patient, quantity 2, Lot Number [id number]     Entresto 24/26: . Samples of this drug were given to the patient, quantity 2, Lot Number [id number]

## 2023-01-09 NOTE — TELEPHONE ENCOUNTER
Advised patient of echo results and Dr. Brian Bunch response. Advised patient to keep to day's 4:45 pm SA appointment with Dr. Nico Atkins. Patient verbalized understanding.

## 2023-01-19 DIAGNOSIS — I50.22 CHRONIC HFREF (HEART FAILURE WITH REDUCED EJECTION FRACTION) (HCC): ICD-10-CM

## 2023-01-19 LAB
ANION GAP SERPL CALC-SCNC: 13 MMOL/L (ref 2–11)
BUN SERPL-MCNC: 16 MG/DL (ref 6–23)
CALCIUM SERPL-MCNC: 9.3 MG/DL (ref 8.3–10.4)
CHLORIDE SERPL-SCNC: 103 MMOL/L (ref 101–110)
CO2 SERPL-SCNC: 22 MMOL/L (ref 21–32)
CREAT SERPL-MCNC: 1.5 MG/DL (ref 0.8–1.5)
GLUCOSE SERPL-MCNC: 113 MG/DL (ref 65–100)
MAGNESIUM SERPL-MCNC: 2 MG/DL (ref 1.8–2.4)
POTASSIUM SERPL-SCNC: 3.9 MMOL/L (ref 3.5–5.1)
SODIUM SERPL-SCNC: 138 MMOL/L (ref 133–143)

## 2023-01-20 ENCOUNTER — TELEPHONE (OUTPATIENT)
Dept: CARDIOLOGY CLINIC | Age: 53
End: 2023-01-20

## 2023-01-20 NOTE — TELEPHONE ENCOUNTER
Pt made aware of results. Verb understanding. States ever since starting entresto, he has developed a \"real bad dry cough. \" Noted pt coughing frequently while speaking to this nurse. Please advise recommendations.

## 2023-01-20 NOTE — TELEPHONE ENCOUNTER
----- Message from Pauline Echevarria MD sent at 1/20/2023  8:47 AM EST -----  Please let the patient know that he has mild renal insufficiency; however, this is relatively stable with a creatinine clearance in the 50s over the last 3 weeks. No new changes to medical therapy at this time.

## 2023-01-20 NOTE — TELEPHONE ENCOUNTER
MD Armando Olmstead, RN  Caller: Unspecified (Today, 11:28 AM)  Please let him know that I discontinued Entresto. He should no longer take the medication. Pt made aware of Dr. Evens Araujo response. Request pt monitor BP daily, around lunch time and notify this office if BP is trending up over the course of a week or two. Confirmed upcoming appt date and time. Pt verb understanding of all instructions.

## 2023-02-07 NOTE — PROGRESS NOTES
[facility] CARDIOLOGY Follow Up                 Reason for Visit: Chronic HFrEF    Subjective:     Patient is a 45 y.o. male with a PMH of chronic HFrEF, NICM, hypertension, and hyperlipidemia who presents for follow-up. The patient was last seen in January 2023. He had a TTE in January 2023 that was noted to demonstrate an EF of 10 to 15%, moderately dilated LV, and RV dysfunction. Jardiance, Toprol-XL, and Entresto were started. His potassium chloride was decreased to 10 mEq twice daily since an ARNI was added to his MRA. A chemistry profile was ordered for 1 week later. Though the lab work was acceptable for continued use of GDMT, the patient reported intolerance to [place] with a cough. [place] was discontinued. Medical records of his prior C and prior TTE were requested. Amlodipine was discontinued. His lab work was sufficient for continued use of GDMT. The patient had a TTE in November 2022 that was noted to demonstrate an EF of 15 to 20%. He had normal coronary arteries noted in December 2013 on [facility]. His EF on ventriculogram at that time was noted to be severely depressed. The patient reports he occasionally misses doses of his medications and did not bring in his medications today. He reports his breathing is improved on GDMT. Past Medical History:   Diagnosis Date    COPD (chronic obstructive pulmonary disease) ([facility] [address]     Hypertension       No past surgical history on file. No family history on file. Social History     Tobacco Use    Smoking status: Every Day    Smokeless tobacco: Never   Substance Use Topics    Alcohol use: Yes      Allergies   Allergen Reactions    Entresto [Sacubitril-Valsartan] Cough    Lisinopril Cough         ROS:  No obvious pertinent positives on review of systems except for what was outlined above.        Objective:       /70   Pulse 100   Ht 5' 8\" (1.727 m)   Wt 141 lb (64 kg)   BMI 21.44 kg/m²     BP Readings from Last 3 Encounters:   02/08/23 100/70   01/09/23 (!) 128/98   01/06/23 (!) 136/90       Wt Readings from Last 3 Encounters:   02/08/23 141 lb (64 kg)   01/09/23 143 lb (64.9 kg)   01/06/23 149 lb (67.6 kg)       General/Constitutional:   Alert and oriented x 3, no acute distress  HEENT:   normocephalic, atraumatic, moist mucous membranes  Neck:   No JVD or carotid bruits bilaterally  Cardiovascular:   regular rate and rhythm, no rub/gallop appreciated  Pulmonary:   clear to auscultation bilaterally, no respiratory distress  Abdomen:   soft, non-tender, non-distended  Ext:   No sig LE edema bilaterally  Skin:  warm and dry, no obvious rashes seen  Neuro:   no obvious sensory or motor deficits  Psychiatric:   normal mood and affect    Data Review:   No results found for: CHOL  No results found for: TRIG  No results found for: HDL  No results found for: LDLCHOLESTEROL, LDLCALC  No results found for: LABVLDL, VLDL  No results found for: [facility]     Lab Results   Component Value Date/Time     01/19/2023 10:30 AM     01/06/2023 08:36 AM     12/29/2022 03:11 PM    K 3.9 01/19/2023 10:30 AM    K 3.6 01/06/2023 08:36 AM    K 3.4 12/29/2022 03:11 PM     01/19/2023 10:30 AM     01/06/2023 08:36 AM     12/29/2022 03:11 PM    CO2 22 01/19/2023 10:30 AM    CO2 24 01/06/2023 08:36 AM    CO2 23 12/29/2022 03:11 PM    BUN 16 01/19/2023 10:30 AM    BUN 15 01/06/2023 08:36 AM    BUN 15 12/29/2022 03:11 PM    CREATININE 1.50 01/19/2023 10:30 AM    CREATININE 1.30 01/06/2023 08:36 AM    CREATININE 1.30 12/29/2022 03:11 PM    GLUCOSE 113 01/19/2023 10:30 AM    GLUCOSE 84 01/06/2023 08:36 AM    GLUCOSE 80 12/29/2022 03:11 PM    CALCIUM 9.3 01/19/2023 10:30 AM    CALCIUM 7.4 01/06/2023 08:36 AM    CALCIUM 8.9 12/29/2022 03:11 PM         Lab Results   Component Value Date    ALT 34 11/03/2022    AST 34 11/03/2022        Assessment/Plan:   1.  Chronic HFrEF (heart failure with reduced ejection fraction) (HCC)  - Continue with Jardiance and Toprol XL  - Continue with PO Lasix and Aldactone pending a chemistry profile  - History of cough with ACEi and ARNI (intolerant)  - Refer to EP to consider ICD  - Obtain a BMP and magnesium level     2. Hypertension, unspecified type  - See GDMT as above    3.  Hyperlipidemia, unspecified hyperlipidemia type  - Continue with pravastatin    F/U: 6 months    Suzy Herzog MD

## 2023-02-08 ENCOUNTER — OFFICE VISIT (OUTPATIENT)
Dept: CARDIOLOGY CLINIC | Age: 53
End: 2023-02-08
Payer: COMMERCIAL

## 2023-02-08 VITALS
WEIGHT: 141 LBS | BODY MASS INDEX: 21.37 KG/M2 | HEIGHT: 68 IN | DIASTOLIC BLOOD PRESSURE: 70 MMHG | HEART RATE: 100 BPM | SYSTOLIC BLOOD PRESSURE: 100 MMHG

## 2023-02-08 DIAGNOSIS — I50.22 CHRONIC HFREF (HEART FAILURE WITH REDUCED EJECTION FRACTION) (HCC): Primary | ICD-10-CM

## 2023-02-08 DIAGNOSIS — I10 HYPERTENSION, UNSPECIFIED TYPE: ICD-10-CM

## 2023-02-08 DIAGNOSIS — E78.5 HYPERLIPIDEMIA, UNSPECIFIED HYPERLIPIDEMIA TYPE: ICD-10-CM

## 2023-02-08 PROCEDURE — 3078F DIAST BP <80 MM HG: CPT | Performed by: INTERNAL MEDICINE

## 2023-02-08 PROCEDURE — 3074F SYST BP LT 130 MM HG: CPT | Performed by: INTERNAL MEDICINE

## 2023-02-08 PROCEDURE — 99214 OFFICE O/P EST MOD 30 MIN: CPT | Performed by: INTERNAL MEDICINE

## 2023-02-09 ENCOUNTER — TELEPHONE (OUTPATIENT)
Dept: CARDIOLOGY CLINIC | Age: 53
End: 2023-02-09

## 2023-02-09 NOTE — TELEPHONE ENCOUNTER
----- Message from Yoshi Short MD sent at 2/8/2023  5:04 PM EST -----  Please let the patient know that his magnesium level, potassium level, and renal function are normal.

## 2023-04-06 ENCOUNTER — TELEPHONE (OUTPATIENT)
Dept: CARDIOLOGY CLINIC | Age: 53
End: 2023-04-06

## 2023-04-06 NOTE — TELEPHONE ENCOUNTER
Patient and wife, Jay Mckeon, state they are very concerned that 3/27/23 appointment with Dr. Steve Morales as cancelled, and patient has not yet been rescheduled to see Dr. Steve Morales for consideration of ICD. Per Misty, advised patient and Apollo that Familia Cazares will call to reschedule appointment with Dr. Steve Morales, this afternoon. Patient and Jay Mckeon verbalized understanding. Routed this triage note to Misty.

## 2023-04-06 NOTE — TELEPHONE ENCOUNTER
Wife and patient called and said patient was suppose to see Dr. Keanu Peck on 03/28/2023. They said the received a call canceling this appt. It appears this appt. Was canceled When patient was here in Feb. 2023. He said there was a test he was suppose to have. Patient wants a call back to be certain Dr. Keanu Peck wants to wait on the test the patient was told about.

## 2023-04-06 NOTE — TELEPHONE ENCOUNTER
Message  Received: Today  Tona Andrews RN  Caller: Unspecified (Today,  9:06 AM)  Patient scheduled with Dr. Petty Epperson 5/2/23.

## 2023-05-02 ENCOUNTER — INITIAL CONSULT (OUTPATIENT)
Dept: CARDIOLOGY CLINIC | Age: 53
End: 2023-05-02
Payer: COMMERCIAL

## 2023-05-02 VITALS
DIASTOLIC BLOOD PRESSURE: 100 MMHG | SYSTOLIC BLOOD PRESSURE: 140 MMHG | HEART RATE: 103 BPM | WEIGHT: 144.6 LBS | BODY MASS INDEX: 21.92 KG/M2 | HEIGHT: 68 IN

## 2023-05-02 DIAGNOSIS — R60.0 BILATERAL LOWER EXTREMITY EDEMA: ICD-10-CM

## 2023-05-02 DIAGNOSIS — I10 PRIMARY HYPERTENSION: ICD-10-CM

## 2023-05-02 DIAGNOSIS — E78.5 HYPERLIPIDEMIA, UNSPECIFIED HYPERLIPIDEMIA TYPE: ICD-10-CM

## 2023-05-02 DIAGNOSIS — I42.8 NICM (NONISCHEMIC CARDIOMYOPATHY) (HCC): ICD-10-CM

## 2023-05-02 DIAGNOSIS — I50.22 CHRONIC HFREF (HEART FAILURE WITH REDUCED EJECTION FRACTION) (HCC): Primary | ICD-10-CM

## 2023-05-02 PROCEDURE — 93000 ELECTROCARDIOGRAM COMPLETE: CPT | Performed by: INTERNAL MEDICINE

## 2023-05-02 PROCEDURE — 3077F SYST BP >= 140 MM HG: CPT | Performed by: INTERNAL MEDICINE

## 2023-05-02 PROCEDURE — 99245 OFF/OP CONSLTJ NEW/EST HI 55: CPT | Performed by: INTERNAL MEDICINE

## 2023-05-02 PROCEDURE — 3080F DIAST BP >= 90 MM HG: CPT | Performed by: INTERNAL MEDICINE

## 2023-05-02 RX ORDER — AMLODIPINE BESYLATE 10 MG/1
10 TABLET ORAL DAILY
COMMUNITY

## 2023-05-02 RX ORDER — SPIRONOLACTONE 25 MG/1
25 TABLET ORAL DAILY
Qty: 90 TABLET | Refills: 3 | Status: SHIPPED | OUTPATIENT
Start: 2023-05-02

## 2023-05-02 ASSESSMENT — ENCOUNTER SYMPTOMS
SHORTNESS OF BREATH: 1
GASTROINTESTINAL NEGATIVE: 1
COUGH: 1
ALLERGIC/IMMUNOLOGIC NEGATIVE: 1
EYES NEGATIVE: 1

## 2023-05-02 NOTE — PROGRESS NOTES
[facility] CARDIOLOGY  [address] [place] [address], [address] [facility], [address] [place] [address]  PHONE: [phone]        23      NAME:  Barak Montano  : 1970  MRN: [id number]     Referring Cardiologist: Darnell Roth MD    Reason for Consultation: NICM, HFrEF    ASSESSMENT and PLAN:  Kerri Ibarra was seen today for consultation and congestive heart failure. Diagnoses and all orders for this visit:    Chronic HFrEF (heart failure with reduced ejection fraction) ([facility] [address] EF 10-15%. Primary hypertension    Hyperlipidemia, unspecified hyperlipidemia type    NICM (nonischemic cardiomyopathy) ([facility] [address]    Bilateral lower extremity edema    45year old male with a history of NICM, EF 10-15%, NYHA class III symptoms here for evaluation for primary prevention ICD. He's had a severe DCM for over 10 years and has class 1 evidence for a primary prevention ICD. He has severe symptoms. He is on GDMT for over 90 days. His risks to the procedure include his severe DCM. -NIDCM - continue GDMT. Plan for ICD implant. Downstream considerations include CCM therapy. -HTN - continue medicines. -EP follow up post procedure.  -Routine cardiac care per Dr. Rad Duran. Procedure to be performed: ICD implant  Device/ablation Company: [employer]  Procedure Date: TBD  Medications to hold, days to hold ([address] [place] [address], [facility]): None  Anesthesia: MAC     ICD  I discussed in detail the potential benefits of ICD therapy, including improved mortality and prevention of SCD. Additionally, I discussed with the patient the potential risks of ICD implantation, including the risk of bleeding, infection, venous occlusion, DVT/PE, pneumothorax, cardiac tamponade, perforation, need for urgent open heart surgery, device/lead failure, lead dislodgement, inappropriate shock(s), heart attack, stroke, arrhythmia, radiation skin injury, kidney damage/failure oversedation, respiratory arrest, and even death.   The patient understands these risks in the context of the potential benefits

## 2023-05-03 ENCOUNTER — TELEPHONE (OUTPATIENT)
Dept: CARDIOLOGY CLINIC | Age: 53
End: 2023-05-03
Payer: COMMERCIAL

## 2023-05-03 DIAGNOSIS — Z86.79 HISTORY OF CHF (CONGESTIVE HEART FAILURE): ICD-10-CM

## 2023-05-03 DIAGNOSIS — I50.22 CHRONIC HFREF (HEART FAILURE WITH REDUCED EJECTION FRACTION) (HCC): ICD-10-CM

## 2023-05-03 DIAGNOSIS — I42.8 NICM (NONISCHEMIC CARDIOMYOPATHY) (HCC): Primary | ICD-10-CM

## 2023-05-03 PROCEDURE — 99080 SPECIAL REPORTS OR FORMS: CPT | Performed by: INTERNAL MEDICINE

## 2023-05-03 NOTE — TELEPHONE ENCOUNTER
We received LA paper work  from [facility] to be filed out   Put in Triage box  to  get filled out

## 2023-05-04 DIAGNOSIS — I50.22 CHRONIC HFREF (HEART FAILURE WITH REDUCED EJECTION FRACTION) (HCC): ICD-10-CM

## 2023-05-04 LAB
ANION GAP SERPL CALC-SCNC: 7 MMOL/L (ref 2–11)
BUN SERPL-MCNC: 11 MG/DL (ref 6–23)
CALCIUM SERPL-MCNC: 8.4 MG/DL (ref 8.3–10.4)
CHLORIDE SERPL-SCNC: 110 MMOL/L (ref 101–110)
CO2 SERPL-SCNC: 24 MMOL/L (ref 21–32)
CREAT SERPL-MCNC: 1.1 MG/DL (ref 0.8–1.5)
ERYTHROCYTE [DISTWIDTH] IN BLOOD BY AUTOMATED COUNT: 14.1 % (ref 11.9–14.6)
GLUCOSE SERPL-MCNC: 88 MG/DL (ref 65–100)
HCT VFR BLD AUTO: 41.5 % (ref 41.1–50.3)
HGB BLD-MCNC: 14.2 G/DL (ref 13.6–17.2)
INR PPP: 1
MCH RBC QN AUTO: 31.4 PG (ref 26.1–32.9)
MCHC RBC AUTO-ENTMCNC: 34.2 G/DL (ref 31.4–35)
MCV RBC AUTO: 91.8 FL (ref 82–102)
NRBC # BLD: 0 K/UL (ref 0–0.2)
PLATELET # BLD AUTO: 231 K/UL (ref 150–450)
PMV BLD AUTO: 11.3 FL (ref 9.4–12.3)
POTASSIUM SERPL-SCNC: 3.6 MMOL/L (ref 3.5–5.1)
PROTHROMBIN TIME: 13.5 SEC (ref 12.6–14.3)
RBC # BLD AUTO: 4.52 M/UL (ref 4.23–5.6)
SODIUM SERPL-SCNC: 141 MMOL/L (ref 133–143)
WBC # BLD AUTO: 5.5 K/UL (ref 4.3–11.1)

## 2023-05-08 ENCOUNTER — ANESTHESIA EVENT (OUTPATIENT)
Dept: CARDIAC CATH/INVASIVE PROCEDURES | Age: 53
End: 2023-05-08
Payer: COMMERCIAL

## 2023-05-08 RX ORDER — LIDOCAINE HYDROCHLORIDE 10 MG/ML
1 INJECTION, SOLUTION INFILTRATION; PERINEURAL
Status: CANCELLED | OUTPATIENT
Start: 2023-05-08 | End: 2023-05-09

## 2023-05-08 RX ORDER — SODIUM CHLORIDE 0.9 % (FLUSH) 0.9 %
5-40 SYRINGE (ML) INJECTION EVERY 12 HOURS SCHEDULED
Status: CANCELLED | OUTPATIENT
Start: 2023-05-08

## 2023-05-08 RX ORDER — SODIUM CHLORIDE 0.9 % (FLUSH) 0.9 %
5-40 SYRINGE (ML) INJECTION PRN
Status: CANCELLED | OUTPATIENT
Start: 2023-05-08

## 2023-05-08 RX ORDER — HYDROMORPHONE HYDROCHLORIDE 2 MG/ML
0.5 INJECTION, SOLUTION INTRAMUSCULAR; INTRAVENOUS; SUBCUTANEOUS EVERY 10 MIN PRN
Status: CANCELLED | OUTPATIENT
Start: 2023-05-08

## 2023-05-08 RX ORDER — FENTANYL CITRATE 50 UG/ML
50 INJECTION, SOLUTION INTRAMUSCULAR; INTRAVENOUS EVERY 5 MIN PRN
Status: CANCELLED | OUTPATIENT
Start: 2023-05-08

## 2023-05-08 RX ORDER — SODIUM CHLORIDE, SODIUM LACTATE, POTASSIUM CHLORIDE, CALCIUM CHLORIDE 600; 310; 30; 20 MG/100ML; MG/100ML; MG/100ML; MG/100ML
INJECTION, SOLUTION INTRAVENOUS CONTINUOUS
Status: CANCELLED | OUTPATIENT
Start: 2023-05-08

## 2023-05-08 RX ORDER — IPRATROPIUM BROMIDE AND ALBUTEROL SULFATE 2.5; .5 MG/3ML; MG/3ML
1 SOLUTION RESPIRATORY (INHALATION)
Status: CANCELLED | OUTPATIENT
Start: 2023-05-08 | End: 2023-05-09

## 2023-05-08 RX ORDER — ACETAMINOPHEN 500 MG
1000 TABLET ORAL ONCE
Status: CANCELLED | OUTPATIENT
Start: 2023-05-08 | End: 2023-05-08

## 2023-05-08 RX ORDER — OXYCODONE HYDROCHLORIDE 5 MG/1
5 TABLET ORAL
Status: CANCELLED | OUTPATIENT
Start: 2023-05-08 | End: 2023-05-09

## 2023-05-08 RX ORDER — ONDANSETRON 2 MG/ML
4 INJECTION INTRAMUSCULAR; INTRAVENOUS
Status: CANCELLED | OUTPATIENT
Start: 2023-05-08 | End: 2023-05-09

## 2023-05-08 RX ORDER — MIDAZOLAM HYDROCHLORIDE 2 MG/2ML
2 INJECTION, SOLUTION INTRAMUSCULAR; INTRAVENOUS
Status: CANCELLED | OUTPATIENT
Start: 2023-05-08 | End: 2023-05-09

## 2023-05-08 RX ORDER — HALOPERIDOL 5 MG/ML
1 INJECTION INTRAMUSCULAR
Status: CANCELLED | OUTPATIENT
Start: 2023-05-08 | End: 2023-05-09

## 2023-05-08 ASSESSMENT — LIFESTYLE VARIABLES: SMOKING_STATUS: 1

## 2023-05-08 NOTE — ANESTHESIA PRE PROCEDURE
05/04/2023 10:12 AM    BILITOT 1.5 11/03/2022 10:12 AM    ALKPHOS 64 11/03/2022 10:12 AM    AST 34 11/03/2022 10:12 AM    ALT 34 11/03/2022 10:12 AM       POC Tests: No results for input(s): POCGLU, POCNA, POCK, POCCL, POCBUN, POCHEMO, POCHCT in the last 72 hours. Coags:   Lab Results   Component Value Date/Time    PROTIME 13.5 05/04/2023 10:12 AM    INR 1.0 05/04/2023 10:12 AM       HCG (If Applicable): No results found for: PREGTESTUR, PREGSERUM, HCG, HCGQUANT     ABGs: No results found for: PHART, PO2ART, BTF1TGZ, LHU4YOS, BEART, O4OMUADV     Type & Screen (If Applicable):  No results found for: LABABO, LABRH    Drug/Infectious Status (If Applicable):  No results found for: HIV, HEPCAB    COVID-19 Screening (If Applicable): No results found for: COVID19        Anesthesia Evaluation  Patient summary reviewed  Airway: Mallampati: II          Dental: normal exam         Pulmonary: breath sounds clear to auscultation  (+) COPD:  current smoker                           Cardiovascular:    (+) hypertension:, CHF (EF 77%): systolic, pulmonary hypertension:, hyperlipidemia        Rhythm: regular  Rate: normal                    Neuro/Psych:               GI/Hepatic/Renal: Neg GI/Hepatic/Renal ROS            Endo/Other: Negative Endo/Other ROS                    Abdominal:             Vascular: Other Findings:           Anesthesia Plan      TIVA     ASA 4       Induction: intravenous. Anesthetic plan and risks discussed with patient and spouse.                         Matthew Rockwell MD   5/8/2023

## 2023-05-08 NOTE — FLOWSHEET NOTE
Patient pre-assessment complete for implantable cardiac defibrillator with Dr. Denver Woods scheduled for 23 at , arrival time 1330. Patient verified using . Patient instructed to bring all home medications in labeled bottles on the day of procedure. NPO status reinforced. Patient given medication instructions by the office. Patient verbalizes understanding of all instructions & denies any questions at this time.

## 2023-05-09 ENCOUNTER — HOSPITAL ENCOUNTER (OUTPATIENT)
Age: 53
Discharge: HOME | End: 2023-05-10
Attending: INTERNAL MEDICINE | Admitting: INTERNAL MEDICINE
Payer: COMMERCIAL

## 2023-05-09 ENCOUNTER — ANESTHESIA (OUTPATIENT)
Dept: CARDIAC CATH/INVASIVE PROCEDURES | Age: 53
End: 2023-05-09
Payer: COMMERCIAL

## 2023-05-09 ENCOUNTER — APPOINTMENT (OUTPATIENT)
Dept: GENERAL RADIOLOGY | Age: 53
End: 2023-05-09
Attending: INTERNAL MEDICINE
Payer: COMMERCIAL

## 2023-05-09 DIAGNOSIS — Z95.810 S/P ICD (INTERNAL CARDIAC DEFIBRILLATOR) PROCEDURE: Primary | ICD-10-CM

## 2023-05-09 DIAGNOSIS — I42.8 NICM (NONISCHEMIC CARDIOMYOPATHY) (HCC): ICD-10-CM

## 2023-05-09 LAB
ECHO BSA: 1.79 M2
MAGNESIUM SERPL-MCNC: 1.9 MG/DL (ref 1.8–2.4)

## 2023-05-09 PROCEDURE — 6370000000 HC RX 637 (ALT 250 FOR IP): Performed by: NURSE PRACTITIONER

## 2023-05-09 PROCEDURE — C1892 INTRO/SHEATH,FIXED,PEEL-AWAY: HCPCS | Performed by: INTERNAL MEDICINE

## 2023-05-09 PROCEDURE — 33249 INSJ/RPLCMT DEFIB W/LEAD(S): CPT | Performed by: INTERNAL MEDICINE

## 2023-05-09 PROCEDURE — 2500000003 HC RX 250 WO HCPCS: Performed by: INTERNAL MEDICINE

## 2023-05-09 PROCEDURE — 2500000003 HC RX 250 WO HCPCS: Performed by: NURSE ANESTHETIST, CERTIFIED REGISTERED

## 2023-05-09 PROCEDURE — C1722 AICD, SINGLE CHAMBER: HCPCS | Performed by: INTERNAL MEDICINE

## 2023-05-09 PROCEDURE — C1895 LEAD, AICD, ENDO DUAL COIL: HCPCS | Performed by: INTERNAL MEDICINE

## 2023-05-09 PROCEDURE — 2709999900 HC NON-CHARGEABLE SUPPLY: Performed by: INTERNAL MEDICINE

## 2023-05-09 PROCEDURE — 2580000003 HC RX 258: Performed by: INTERNAL MEDICINE

## 2023-05-09 PROCEDURE — 6360000004 HC RX CONTRAST MEDICATION: Performed by: INTERNAL MEDICINE

## 2023-05-09 PROCEDURE — 3700000000 HC ANESTHESIA ATTENDED CARE: Performed by: INTERNAL MEDICINE

## 2023-05-09 PROCEDURE — 6360000002 HC RX W HCPCS: Performed by: NURSE ANESTHETIST, CERTIFIED REGISTERED

## 2023-05-09 PROCEDURE — 6370000000 HC RX 637 (ALT 250 FOR IP): Performed by: INTERNAL MEDICINE

## 2023-05-09 PROCEDURE — 93005 ELECTROCARDIOGRAM TRACING: CPT | Performed by: INTERNAL MEDICINE

## 2023-05-09 PROCEDURE — 6360000002 HC RX W HCPCS: Performed by: INTERNAL MEDICINE

## 2023-05-09 PROCEDURE — C1894 INTRO/SHEATH, NON-LASER: HCPCS | Performed by: INTERNAL MEDICINE

## 2023-05-09 PROCEDURE — 3700000001 HC ADD 15 MINUTES (ANESTHESIA): Performed by: INTERNAL MEDICINE

## 2023-05-09 PROCEDURE — 71045 X-RAY EXAM CHEST 1 VIEW: CPT

## 2023-05-09 PROCEDURE — 83735 ASSAY OF MAGNESIUM: CPT

## 2023-05-09 PROCEDURE — 2500000003 HC RX 250 WO HCPCS: Performed by: NURSE PRACTITIONER

## 2023-05-09 DEVICE — IMPLANTABLE DEVICE
Type: IMPLANTABLE DEVICE | Status: FUNCTIONAL
Brand: ACTICOR 7 VR-T DX DF4 PROMRI

## 2023-05-09 DEVICE — IMPLANTABLE DEVICE
Type: IMPLANTABLE DEVICE | Status: FUNCTIONAL
Brand: PLEXA PROMRI

## 2023-05-09 RX ORDER — OXYCODONE HYDROCHLORIDE 5 MG/1
5 TABLET ORAL EVERY 4 HOURS PRN
Status: DISCONTINUED | OUTPATIENT
Start: 2023-05-09 | End: 2023-05-10 | Stop reason: HOSPADM

## 2023-05-09 RX ORDER — OXYCODONE HYDROCHLORIDE 5 MG/1
10 TABLET ORAL EVERY 4 HOURS PRN
Status: DISCONTINUED | OUTPATIENT
Start: 2023-05-09 | End: 2023-05-10 | Stop reason: HOSPADM

## 2023-05-09 RX ORDER — ASPIRIN 81 MG/1
81 TABLET, CHEWABLE ORAL DAILY
Status: DISCONTINUED | OUTPATIENT
Start: 2023-05-10 | End: 2023-05-10 | Stop reason: HOSPADM

## 2023-05-09 RX ORDER — ONDANSETRON 2 MG/ML
4 INJECTION INTRAMUSCULAR; INTRAVENOUS EVERY 6 HOURS PRN
Status: DISCONTINUED | OUTPATIENT
Start: 2023-05-09 | End: 2023-05-10 | Stop reason: HOSPADM

## 2023-05-09 RX ORDER — AMLODIPINE BESYLATE 10 MG/1
10 TABLET ORAL DAILY
Status: DISCONTINUED | OUTPATIENT
Start: 2023-05-09 | End: 2023-05-10

## 2023-05-09 RX ORDER — SODIUM CHLORIDE 0.9 % (FLUSH) 0.9 %
5-40 SYRINGE (ML) INJECTION EVERY 12 HOURS SCHEDULED
Status: DISCONTINUED | OUTPATIENT
Start: 2023-05-09 | End: 2023-05-10 | Stop reason: HOSPADM

## 2023-05-09 RX ORDER — LIDOCAINE HYDROCHLORIDE AND EPINEPHRINE 10; 10 MG/ML; UG/ML
INJECTION, SOLUTION INFILTRATION; PERINEURAL PRN
Status: DISCONTINUED | OUTPATIENT
Start: 2023-05-09 | End: 2023-05-09 | Stop reason: HOSPADM

## 2023-05-09 RX ORDER — SODIUM CHLORIDE, SODIUM LACTATE, POTASSIUM CHLORIDE, CALCIUM CHLORIDE 600; 310; 30; 20 MG/100ML; MG/100ML; MG/100ML; MG/100ML
INJECTION, SOLUTION INTRAVENOUS CONTINUOUS
Status: DISCONTINUED | OUTPATIENT
Start: 2023-05-09 | End: 2023-05-10 | Stop reason: HOSPADM

## 2023-05-09 RX ORDER — METOPROLOL TARTRATE 5 MG/5ML
5 INJECTION INTRAVENOUS EVERY 4 HOURS PRN
Status: DISCONTINUED | OUTPATIENT
Start: 2023-05-09 | End: 2023-05-10 | Stop reason: HOSPADM

## 2023-05-09 RX ORDER — POLYETHYLENE GLYCOL 3350 17 G/17G
17 POWDER, FOR SOLUTION ORAL DAILY PRN
Status: DISCONTINUED | OUTPATIENT
Start: 2023-05-09 | End: 2023-05-10 | Stop reason: HOSPADM

## 2023-05-09 RX ORDER — SODIUM CHLORIDE 9 MG/ML
INJECTION, SOLUTION INTRAVENOUS PRN
Status: DISCONTINUED | OUTPATIENT
Start: 2023-05-09 | End: 2023-05-10 | Stop reason: HOSPADM

## 2023-05-09 RX ORDER — ACETAMINOPHEN 325 MG/1
650 TABLET ORAL EVERY 4 HOURS PRN
Status: DISCONTINUED | OUTPATIENT
Start: 2023-05-09 | End: 2023-05-10 | Stop reason: HOSPADM

## 2023-05-09 RX ORDER — SODIUM CHLORIDE 0.9 % (FLUSH) 0.9 %
5-40 SYRINGE (ML) INJECTION PRN
Status: DISCONTINUED | OUTPATIENT
Start: 2023-05-09 | End: 2023-05-10 | Stop reason: HOSPADM

## 2023-05-09 RX ORDER — PRAVASTATIN SODIUM 20 MG
10 TABLET ORAL NIGHTLY
Status: DISCONTINUED | OUTPATIENT
Start: 2023-05-09 | End: 2023-05-10 | Stop reason: HOSPADM

## 2023-05-09 RX ORDER — SPIRONOLACTONE 25 MG/1
25 TABLET ORAL DAILY
Status: DISCONTINUED | OUTPATIENT
Start: 2023-05-09 | End: 2023-05-10 | Stop reason: HOSPADM

## 2023-05-09 RX ORDER — MAGNESIUM HYDROXIDE/ALUMINUM HYDROXICE/SIMETHICONE 120; 1200; 1200 MG/30ML; MG/30ML; MG/30ML
15 SUSPENSION ORAL EVERY 6 HOURS PRN
Status: DISCONTINUED | OUTPATIENT
Start: 2023-05-09 | End: 2023-05-10 | Stop reason: HOSPADM

## 2023-05-09 RX ORDER — FUROSEMIDE 40 MG/1
40 TABLET ORAL DAILY
Status: DISCONTINUED | OUTPATIENT
Start: 2023-05-10 | End: 2023-05-10 | Stop reason: HOSPADM

## 2023-05-09 RX ORDER — AMLODIPINE BESYLATE 10 MG/1
10 TABLET ORAL
Status: DISCONTINUED | OUTPATIENT
Start: 2023-05-09 | End: 2023-05-09 | Stop reason: SDUPTHER

## 2023-05-09 RX ORDER — METOPROLOL SUCCINATE 25 MG/1
25 TABLET, EXTENDED RELEASE ORAL 2 TIMES DAILY
Status: DISCONTINUED | OUTPATIENT
Start: 2023-05-09 | End: 2023-05-10

## 2023-05-09 RX ORDER — PROPOFOL 10 MG/ML
INJECTION, EMULSION INTRAVENOUS PRN
Status: DISCONTINUED | OUTPATIENT
Start: 2023-05-09 | End: 2023-05-09 | Stop reason: SDUPTHER

## 2023-05-09 RX ORDER — METOPROLOL SUCCINATE 25 MG/1
25 TABLET, EXTENDED RELEASE ORAL DAILY
Status: DISCONTINUED | OUTPATIENT
Start: 2023-05-10 | End: 2023-05-09

## 2023-05-09 RX ORDER — LIDOCAINE HYDROCHLORIDE 20 MG/ML
INJECTION, SOLUTION EPIDURAL; INFILTRATION; INTRACAUDAL; PERINEURAL PRN
Status: DISCONTINUED | OUTPATIENT
Start: 2023-05-09 | End: 2023-05-09 | Stop reason: SDUPTHER

## 2023-05-09 RX ADMIN — PROPOFOL 10 MG: 10 INJECTION, EMULSION INTRAVENOUS at 17:27

## 2023-05-09 RX ADMIN — Medication 2 G: at 17:29

## 2023-05-09 RX ADMIN — PROPOFOL 120 MCG/KG/MIN: 10 INJECTION, EMULSION INTRAVENOUS at 17:24

## 2023-05-09 RX ADMIN — SODIUM CHLORIDE, SODIUM LACTATE, POTASSIUM CHLORIDE, AND CALCIUM CHLORIDE: 600; 310; 30; 20 INJECTION, SOLUTION INTRAVENOUS at 17:04

## 2023-05-09 RX ADMIN — METOPROLOL SUCCINATE 25 MG: 25 TABLET, EXTENDED RELEASE ORAL at 23:51

## 2023-05-09 RX ADMIN — PROPOFOL 10 MG: 10 INJECTION, EMULSION INTRAVENOUS at 17:45

## 2023-05-09 RX ADMIN — LIDOCAINE HYDROCHLORIDE 20 MG: 20 INJECTION, SOLUTION EPIDURAL; INFILTRATION; INTRACAUDAL; PERINEURAL at 17:23

## 2023-05-09 RX ADMIN — PROPOFOL 20 MG: 10 INJECTION, EMULSION INTRAVENOUS at 17:44

## 2023-05-09 RX ADMIN — ONDANSETRON 4 MG: 2 INJECTION INTRAMUSCULAR; INTRAVENOUS at 19:18

## 2023-05-09 RX ADMIN — METOPROLOL TARTRATE 5 MG: 5 INJECTION INTRAVENOUS at 23:40

## 2023-05-09 RX ADMIN — AMLODIPINE BESYLATE 10 MG: 10 TABLET ORAL at 19:10

## 2023-05-09 RX ADMIN — SODIUM CHLORIDE, PRESERVATIVE FREE 10 ML: 5 INJECTION INTRAVENOUS at 21:09

## 2023-05-09 RX ADMIN — NEOMYCIN AND POLYMYXIN B SULFATES: 40; 200000 SOLUTION IRRIGATION at 17:58

## 2023-05-09 RX ADMIN — PROPOFOL 20 MG: 10 INJECTION, EMULSION INTRAVENOUS at 17:42

## 2023-05-09 RX ADMIN — ACETAMINOPHEN 650 MG: 325 TABLET ORAL at 23:51

## 2023-05-09 RX ADMIN — PROPOFOL 50 MG: 10 INJECTION, EMULSION INTRAVENOUS at 17:23

## 2023-05-09 ASSESSMENT — PAIN DESCRIPTION - ORIENTATION: ORIENTATION: LEFT

## 2023-05-09 ASSESSMENT — PAIN DESCRIPTION - DESCRIPTORS: DESCRIPTORS: ACHING;TENDER;SORE;THROBBING

## 2023-05-09 ASSESSMENT — PAIN DESCRIPTION - LOCATION: LOCATION: INCISION;SHOULDER

## 2023-05-09 ASSESSMENT — PAIN SCALES - GENERAL: PAINLEVEL_OUTOF10: 7

## 2023-05-09 ASSESSMENT — PAIN - FUNCTIONAL ASSESSMENT: PAIN_FUNCTIONAL_ASSESSMENT: PREVENTS OR INTERFERES SOME ACTIVE ACTIVITIES AND ADLS

## 2023-05-09 NOTE — PROGRESS NOTES
Patient received to [address] [place] [address] room # 10  Ambulatory from [facility]. Patient scheduled for ICD today with Dr Saint Cromer. Procedure reviewed & questions answered, voiced good understanding consent obtained & placed on chart. All medications and medical history reviewed. Will prep patient per orders. Patient & family updated on plan of care. The patient has a fraility score of 3-MANAGING WELL, based on ambulation.

## 2023-05-09 NOTE — ANESTHESIA POSTPROCEDURE EVALUATION
Department of Anesthesiology  Postprocedure Note    Patient: Dalia Gutierrez  MRN: [id number]  YOB: 1970  Date of evaluation: 5/9/2023      Procedure Summary     Date: 05/09/23 Room / Location: [facility] 2 ALL EVENTS / [facility] CARDIAC CATH LAB    Anesthesia Start: 3564 Anesthesia Stop: 2139    Procedure: Insert ICD dual Diagnosis:       NICM (nonischemic cardiomyopathy) ([facility] [address]      (NICM (nonischemic cardiomyopathy) ([facility] [address] [I42.8])    Providers: Stephania Adam MD Responsible Provider: Rhonda Pro DO    Anesthesia Type: TIVA ASA Status: 4          Anesthesia Type: No value filed. Mendoza Phase I: Mendoza Score: 10    Mendoza Phase II: Mendoza Score: 10      Anesthesia Post Evaluation    Patient location during evaluation: PACU  Level of consciousness: awake and alert  Airway patency: patent  Nausea & Vomiting: no nausea  Complications: no  Cardiovascular status: hypertensive  Respiratory status: acceptable  Hydration status: euvolemic  Comments:  Will give night dose of BP meds now for better BP control

## 2023-05-09 NOTE — PROCEDURES
PW (msec) Impedance (ohms) Final output Voltage (V) Final PW (msec)   RA 8.0 -- -- -- -- --   RV 18.0 0.4 0.4 542 3.5 0.4     Bradycardia Settings  George Mode LRL URL Pace AVD (ms) Sense AVD (ms) Rate Response Mode Switching Mode SW Rate   VDI 40 -- -- -- -- -- --     Tachycardia Settings  Zone Type VT1 VT2 VF   ON/OFF/  MONITOR MONITOR ON ON   Zone Rate 162 182 222   1st Therapy Type -- ATP-BURST x1 ATP-burst x1   Energy (J) -- 85% 88%   2nd Therapy Type -- ATP-BURST x1 Shock    Energy (J) -- 85% 40   3rd Therapy Type -- Shock Shock   Energy (J) -- 40 40   4th Therapy Type -- Shock Shock   Energy (J) -- 40 40   5th Therapy Type -- Shock Shock   Energy (J) -- 40 40   6th Therapy Type -- Shock Shock   Energy (J) -- 40*5 40*4     No Defibrillation Threshold Testing    HV: 63 ohms    IMPRESSION: Successful single chamber ICD implant (Dx). MRI conditional.      PLAN: Overnight observation.  -Routine CIED instructions.  -Device clinic follow up in 1-2 weeks. Moisés Pappas.  Quin Wood MD, [address]  Clinical Cardiac Electrophysiology  [facility] Cardiology      5/9/2023  6:09 PM

## 2023-05-09 NOTE — PROGRESS NOTES
TRANSFER - OUT REPORT:    Verbal report given to SHEYLA/Lonny Peng 1106 on [facility]  being transferred to [facility] for routine progression of patient care       Report consisted of patient's Situation, Background, Assessment and   Recommendations(SBAR). Information from the following report(s) Nurse Handoff Report was reviewed with the receiving nurse. [place] Assessment: No data recorded  Lines:   Peripheral IV 05/09/23 Right Antecubital (Active)        Opportunity for questions and clarification was provided.

## 2023-05-10 VITALS
HEART RATE: 112 BPM | TEMPERATURE: 97.9 F | SYSTOLIC BLOOD PRESSURE: 137 MMHG | RESPIRATION RATE: 20 BRPM | OXYGEN SATURATION: 95 % | DIASTOLIC BLOOD PRESSURE: 98 MMHG | BODY MASS INDEX: 21.16 KG/M2 | HEIGHT: 68 IN | WEIGHT: 139.6 LBS

## 2023-05-10 LAB
ALBUMIN SERPL-MCNC: 2.6 G/DL (ref 3.5–5)
ALBUMIN/GLOB SERPL: 0.7 (ref 0.4–1.6)
ALP SERPL-CCNC: 58 U/L (ref 50–136)
ALT SERPL-CCNC: 19 U/L (ref 12–65)
ANION GAP SERPL CALC-SCNC: 4 MMOL/L (ref 2–11)
AST SERPL-CCNC: 19 U/L (ref 15–37)
BILIRUB SERPL-MCNC: 1.9 MG/DL (ref 0.2–1.1)
BUN SERPL-MCNC: 16 MG/DL (ref 6–23)
CALCIUM SERPL-MCNC: 8.8 MG/DL (ref 8.3–10.4)
CHLORIDE SERPL-SCNC: 111 MMOL/L (ref 101–110)
CO2 SERPL-SCNC: 24 MMOL/L (ref 21–32)
CREAT SERPL-MCNC: 1.3 MG/DL (ref 0.8–1.5)
EKG ATRIAL RATE: 103 BPM
EKG ATRIAL RATE: 105 BPM
EKG ATRIAL RATE: 113 BPM
EKG DIAGNOSIS: NORMAL
EKG P AXIS: 49 DEGREES
EKG P AXIS: 50 DEGREES
EKG P AXIS: 54 DEGREES
EKG P-R INTERVAL: 148 MS
EKG P-R INTERVAL: 158 MS
EKG P-R INTERVAL: 158 MS
EKG Q-T INTERVAL: 364 MS
EKG Q-T INTERVAL: 378 MS
EKG Q-T INTERVAL: 392 MS
EKG QRS DURATION: 108 MS
EKG QRS DURATION: 112 MS
EKG QRS DURATION: 114 MS
EKG QTC CALCULATION (BAZETT): 499 MS
EKG QTC CALCULATION (BAZETT): 499 MS
EKG QTC CALCULATION (BAZETT): 513 MS
EKG R AXIS: -78 DEGREES
EKG R AXIS: -85 DEGREES
EKG R AXIS: 2 DEGREES
EKG T AXIS: 119 DEGREES
EKG T AXIS: 52 DEGREES
EKG T AXIS: 88 DEGREES
EKG VENTRICULAR RATE: 103 BPM
EKG VENTRICULAR RATE: 105 BPM
EKG VENTRICULAR RATE: 113 BPM
ERYTHROCYTE [DISTWIDTH] IN BLOOD BY AUTOMATED COUNT: 13.4 % (ref 11.9–14.6)
GLOBULIN SER CALC-MCNC: 3.5 G/DL (ref 2.8–4.5)
GLUCOSE SERPL-MCNC: 126 MG/DL (ref 65–100)
HCT VFR BLD AUTO: 39.3 % (ref 41.1–50.3)
HGB BLD-MCNC: 13.5 G/DL (ref 13.6–17.2)
MCH RBC QN AUTO: 31.6 PG (ref 26.1–32.9)
MCHC RBC AUTO-ENTMCNC: 34.4 G/DL (ref 31.4–35)
MCV RBC AUTO: 92 FL (ref 82–102)
NRBC # BLD: 0 K/UL (ref 0–0.2)
PLATELET # BLD AUTO: 212 K/UL (ref 150–450)
PMV BLD AUTO: 11 FL (ref 9.4–12.3)
POTASSIUM SERPL-SCNC: 3.8 MMOL/L (ref 3.5–5.1)
PROT SERPL-MCNC: 6.1 G/DL (ref 6.3–8.2)
RBC # BLD AUTO: 4.27 M/UL (ref 4.23–5.6)
SODIUM SERPL-SCNC: 139 MMOL/L (ref 133–143)
WBC # BLD AUTO: 8 K/UL (ref 4.3–11.1)

## 2023-05-10 PROCEDURE — 6370000000 HC RX 637 (ALT 250 FOR IP): Performed by: PHYSICIAN ASSISTANT

## 2023-05-10 PROCEDURE — 36415 COLL VENOUS BLD VENIPUNCTURE: CPT

## 2023-05-10 PROCEDURE — 6370000000 HC RX 637 (ALT 250 FOR IP): Performed by: NURSE PRACTITIONER

## 2023-05-10 PROCEDURE — 6370000000 HC RX 637 (ALT 250 FOR IP): Performed by: INTERNAL MEDICINE

## 2023-05-10 PROCEDURE — 80053 COMPREHEN METABOLIC PANEL: CPT

## 2023-05-10 PROCEDURE — 85027 COMPLETE CBC AUTOMATED: CPT

## 2023-05-10 PROCEDURE — 93005 ELECTROCARDIOGRAM TRACING: CPT | Performed by: PHYSICIAN ASSISTANT

## 2023-05-10 PROCEDURE — 2580000003 HC RX 258: Performed by: INTERNAL MEDICINE

## 2023-05-10 RX ORDER — ISOSORBIDE MONONITRATE 30 MG/1
30 TABLET, EXTENDED RELEASE ORAL DAILY
Qty: 30 TABLET | Refills: 5 | Status: SHIPPED | OUTPATIENT
Start: 2023-05-11

## 2023-05-10 RX ORDER — OXYCODONE HYDROCHLORIDE 5 MG/1
5 TABLET ORAL EVERY 8 HOURS PRN
Qty: 10 TABLET | Refills: 0 | Status: SHIPPED | OUTPATIENT
Start: 2023-05-10 | End: 2023-05-17

## 2023-05-10 RX ORDER — AMLODIPINE BESYLATE 10 MG/1
10 TABLET ORAL DAILY
Status: DISCONTINUED | OUTPATIENT
Start: 2023-05-10 | End: 2023-05-10 | Stop reason: HOSPADM

## 2023-05-10 RX ORDER — METOPROLOL SUCCINATE 25 MG/1
50 TABLET, EXTENDED RELEASE ORAL 2 TIMES DAILY
Status: DISCONTINUED | OUTPATIENT
Start: 2023-05-10 | End: 2023-05-10 | Stop reason: HOSPADM

## 2023-05-10 RX ORDER — ISOSORBIDE MONONITRATE 30 MG/1
30 TABLET, EXTENDED RELEASE ORAL DAILY
Status: DISCONTINUED | OUTPATIENT
Start: 2023-05-10 | End: 2023-05-10 | Stop reason: HOSPADM

## 2023-05-10 RX ORDER — METOPROLOL SUCCINATE 50 MG/1
50 TABLET, EXTENDED RELEASE ORAL 2 TIMES DAILY
Qty: 60 TABLET | Refills: 5 | Status: SHIPPED | OUTPATIENT
Start: 2023-05-10

## 2023-05-10 RX ADMIN — IVABRADINE 5 MG: 5 TABLET, FILM COATED ORAL at 17:09

## 2023-05-10 RX ADMIN — AMLODIPINE BESYLATE 10 MG: 10 TABLET ORAL at 14:40

## 2023-05-10 RX ADMIN — SODIUM CHLORIDE, PRESERVATIVE FREE 10 ML: 5 INJECTION INTRAVENOUS at 08:27

## 2023-05-10 RX ADMIN — IVABRADINE 5 MG: 5 TABLET, FILM COATED ORAL at 08:27

## 2023-05-10 RX ADMIN — SPIRONOLACTONE 25 MG: 25 TABLET ORAL at 08:27

## 2023-05-10 RX ADMIN — EMPAGLIFLOZIN 10 MG: 10 TABLET, FILM COATED ORAL at 08:27

## 2023-05-10 RX ADMIN — ISOSORBIDE MONONITRATE 30 MG: 30 TABLET, EXTENDED RELEASE ORAL at 08:27

## 2023-05-10 RX ADMIN — METOPROLOL SUCCINATE 50 MG: 25 TABLET, EXTENDED RELEASE ORAL at 08:27

## 2023-05-10 RX ADMIN — ASPIRIN 81 MG 81 MG: 81 TABLET ORAL at 08:27

## 2023-05-10 RX ADMIN — FUROSEMIDE 40 MG: 40 TABLET ORAL at 08:27

## 2023-05-10 ASSESSMENT — PAIN SCALES - GENERAL
PAINLEVEL_OUTOF10: 0

## 2023-05-10 NOTE — PLAN OF CARE
Problem: Discharge Planning  Goal: Discharge to home or other facility with appropriate resources  Outcome: Progressing  Flowsheets (Taken 5/9/2023 2038 by Natividad Roth, RN)  Discharge to home or other facility with appropriate resources:   Identify barriers to discharge with patient and caregiver   Arrange for needed discharge resources and transportation as appropriate   Identify discharge learning needs (meds, wound care, etc)   Refer to discharge planning if patient needs post-hospital services based on physician order or complex needs related to functional status, cognitive ability or social support system     Problem: ABCDS Injury Assessment  Goal: Absence of physical injury  Outcome: Progressing     Problem: Pain  Goal: Verbalizes/displays adequate comfort level or baseline comfort level  Outcome: Progressing

## 2023-05-10 NOTE — DISCHARGE SUMMARY
[facility] Card[facility] Discharge Summary     Patient ID:  Mehul Mcdonnell  [id number]  89 y.o.  1970    Admit date: 5/9/2023    Discharge date:  5/10/2023    Admitting Physician: Joel Mart MD     Discharge Physician: Dr. Ileana Covarrubias    Admission Diagnoses: NICM (nonischemic cardiomyopathy) ([facility] [address] [I42.8]  S/P ICD (internal cardiac defibrillator) procedure [Z95.810]    Discharge Diagnoses:   Patient Active Problem List    Diagnosis    S/P ICD (internal cardiac defibrillator) procedure    NICM (nonischemic cardiomyopathy) ([facility] [address] -- on Toprol XL -- no ACEi/ARB/ARNI due to intolerance       Cardiology Procedures this admission:   ICD implantation, CXR  and device interrogation   Consults: none    Hospital Course: Patient was seen at the office of [facility] by Dr. Ileana Covarrubias for management of NICM and was subsequently scheduled for an AM Admission ICD implantation at [facility] on 5/9/23. Patient was taken to the EP lab and underwent successful implantation of Biotronik ICD by Dr. Ileana Covarrubias. Patient tolerated the procedure well and was taken to the telemetry floor for recovery. Follow up chest xray showed no pneumothorax. The following morning patient was up feeling well without any complaints of chest pain, shortness of breath, or palpitations. Patient's left subclavian cath site was clean, dry and intact without hematoma. Patient's device was interrogated prior to d/c showing normal function. Patient developed some chest discomfort prior to d/c and EKG was unremarkable. Patient was seen and examined by Dr. Ileana Covarrubias and determined stable and ready for discharge. Patient was instructed on the importance of medication compliance and outpatient follow up. The patient's BB was increased and he was started on corlanor prior to d/c due to tachycardia. Patient has been scheduled for follow-up with [facility] device clinic Monday May 22 at [address] [place] [address] office.      DISPOSITION: Patient has been

## 2023-05-10 NOTE — PLAN OF CARE
Discharge instructions were reviewed with patient. An opportunity was given for questions. All medications were reviewed, and information was given on new medications, if any. Patient verbalized understanding, and has no questions at this time. IV's and telemetry box removed. The patient left the office before the visit was finished. Subclavian site CDI with no bleeding or swelling.

## 2023-05-10 NOTE — NURSE NAVIGATOR
CHF teaching completed to Pt's family @ BS. CHF background completed. Refresher Teaching from previous admit emphasis on Call Cardiology STAT ,if any of the following are noted:  Short of Breath; with activity or without/ while reclined, Generalize weakness, edema are noted individually or grouped. Cardiac Diet  and salt/fluid restrictions covered. Daily WTs emphasized. Planner with summarization sheet provided with cardiology service and phone number and bathroom scale offered. Total time @ BS is 1 hour and pt 's FM verbalize understanding/past post test.  Pt instructed to call myself, if any questions occur. Patient is already knowledgeable with heart failure teachings. Family at bedside.

## 2023-05-10 NOTE — DISCHARGE INSTRUCTIONS
DEVICE IMPLANT FOLLOWUP INSTRUCTIONS  You will be discharged with an occlusive dressing over the incision site. This dressing will be removed at the 10 -14-day postop site check with the [address]. Your new device will be checked at that visit and all your device teaching will be given. Keep the incision site dry until your follow up. Use a hand-held shower or bath. Do not submerge or soak in pools or tubs for 6 weeks. If you are discharged with a prescription for antibiotics, please take the full prescription until it is gone. After your site check, you may shower and get the incision site wet. You may let soap and water run on the incision and pat dry. Please do not apply creams, lotions or powders on or near the incision. Mild bruising and swelling can be normal after implant and will resolve in a few weeks. Call the office at [phone] if you have any of the following:  -Signs of infection, such as fever over 100 F, drainage from the incision, redness, significant swelling, or warmth at the incision site.  -Significant pain around the site that gets worse. Mild discomfort can be normal.   -Bleeding from the incision site  -Swelling in the arm on the side of the incision site  -Chest pain or shortness of breath     Your device has the capability of transmitting device information from home to our office using a home monitoring system or phone mukund. The information will transmit through a cellular connection outside of your home. Your device data is reviewed during working hours to ensure proper device function. You will be given your equipment and instruction upon your hospital discharge.                                                                       -You will be given a sling to wear upon discharge with instructions when it should be worn.    -Do not lift the affected arm above the shoulder level, on the side the device was put in,

## 2023-05-10 NOTE — PROGRESS NOTES
Patient's skin is overall clean, dry, and intact. Patient has L subclavian site s/p ICD placement that is clean, dry, and intact. No swelling/hematoma/ecchymosis noted. Patient has scattered scarring. Sacrum and heels are clean, dry, and intact. Verified with Diamond HELTON.

## 2023-05-10 NOTE — CARE COORDINATION
Pt presented for scheduled ICD today with Dr Mack Magaña. PTA, pt indep with all his ADLs. Lives with spouse. On RA. Denies DME need. PCP confirmed. [employer] verified and able to afford home meds. No discharge needs identified but will remain available. 1237- Discharge order is in. Pt was seen at the office of [facility] by Dr. Mack Magaañ for management of NICM and was subsequently scheduled for an ICD implantation at [facility] on 5/9/23. Pt was taken to the EP lab and underwent successful implantation of Biotronik ICD by Dr. Mack Magaña and tolerated the procedure well. Follow up CXR showed no pneumothorax. No discharge needs identified. Tx goals met.        05/10/23 0936   Service Assessment   Patient Orientation Alert and Oriented   Cognition Alert   History Provided By Patient   Primary Caregiver Self   Support Systems Spouse/Significant Other;Family Members; Children;Friends/Neighbors   PCP Verified by CM Yes  (Amanda Mcgrath)   Prior Functional Level Independent in ADLs/IADLs   Current Functional Level Independent in ADLs/IADLs   Can patient return to prior living arrangement Yes   Ability to make needs known: Good   Family able to assist with home care needs: Yes   Would you like for me to discuss the discharge plan with any other family members/significant others, and if so, who? No   Financial Resources Other (Comment)  (Kwanji-sfilatino)   Community Resources None   Social/Functional History   Lives With Spouse   Type of Home Mobile home   Home Layout One level   [address]   Occupation Unemployed   Discharge Planning   Type of Residence Trailer/Mobile [address] [facility] Prior To Admission None   Potential Assistance Needed N/A   DME Ordered?  No   Potential Assistance Purchasing Medications No   Type of Home Care Services None   Patient

## 2023-05-10 NOTE — PROGRESS NOTES
Comprehensive Nutrition Assessment    Type and Reason for Visit: Initial, Positive Nutrition Screen  Malnutrition Screening Tool: Malnutrition Screen  Have you recently lost weight without trying?: 2 to 13 pounds (1 point)  Have you been eating poorly because of a decreased appetite?: Yes (1 point)  Malnutrition Screening Tool Score: 2    Nutrition Recommendations/Plan:   Meals and Snacks:  Diet: Continue current order     Malnutrition Assessment:  Malnutrition Status: At risk for malnutrition (Comment) (unintended wt loss related to food intake patterns)  No maryam muscle or fat loss appreciated     Nutrition Assessment:  Nutrition History: Pt reports his intake has been 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 He reprots UBW of 156#, 165# on his 's license. Wt hx below per cardiology office. Do You Have Any Cultural, Gnosticist, or Ethnic Food Preferences?: No   Nutrition Background:       PMH remarkable for chronic HFrEF, NICM, HTN and HLD. S/p ICD placement. Nutrition Interval:  Pt reclined in bed at RD visit. Eating majority of foods here. He reports his wife intends to stay on him to get stronger and prevent losing additional wt. Discussed use of ONS as meal replacement versus skipping meals altogether if schedule does not allow. Provided vanilla supplement for trial at pt request as he is planning for D/C today. Current Nutrition Therapies:  ADULT DIET; Regular; Low Fat/Low Chol/High Fiber/2 gm Na    Current Intake:   Average Meal Intake: %        Anthropometric Measures:  Height: 5' 8\" (172.7 cm)  Current Body Wt: 139 lb 8.8 oz (63.3 kg) (5/10), Weight source: Standing Scale  BMI: 21.2, Normal Weight (BMI 18.5-24. 9)  Admission Body Weight: 147 lb (66.7 kg) (stated)  Ideal Body Weight (Kg) (Calculated): 70 kg (154 lbs), 90.6 %  Usual Body Wt: 149 lb

## 2023-05-15 ENCOUNTER — TELEPHONE (OUTPATIENT)
Dept: CARDIOLOGY CLINIC | Age: 53
End: 2023-05-15

## 2023-05-15 DIAGNOSIS — I50.22 CHRONIC HFREF (HEART FAILURE WITH REDUCED EJECTION FRACTION) (HCC): Primary | ICD-10-CM

## 2023-05-15 NOTE — TELEPHONE ENCOUNTER
Pt with hx of cardiomyopathy and s/p ICD on 5/9/23. Calling with c/o SOB, bloating, hot flashes and high BP (147/114) over past 3 day. States not having any swelling but took total of 3 Furosemide 40 mg pills yesterday to see if it helped with bloating, and reports feeling better this morning. He states he is unsure why he is feeling this way. He states he is not eating salt but does drink a lot of water. Any med changes? Please advise. . Thanks    Current Outpatient Medications   Medication Instructions    amLODIPine (NORVASC) 10 mg, Oral, DAILY    ASPIRIN 81 PO 81 mg, Oral, DAILY    empagliflozin (JARDIANCE) 10 mg, Oral, DAILY    furosemide (LASIX) 40 mg, Oral, DAILY    isosorbide mononitrate (IMDUR) 30 mg, Oral, DAILY    ivabradine (CORLANOR) 5 mg, Oral, 2 TIMES DAILY WITH MEALS    metoprolol succinate (TOPROL XL) 50 mg, Oral, 2 TIMES DAILY    oxyCODONE (ROXICODONE) 5 mg, Oral, EVERY 8 HOURS PRN    pravastatin (PRAVACHOL) 10 mg, Oral, DAILY    spironolactone (ALDACTONE) 25 mg, Oral, DAILY

## 2023-05-15 NOTE — TELEPHONE ENCOUNTER
Per Dr. Harvis Seip can you have him force a transmission and bring him in tomorrow to see what's going on. Spoke with pts wife and instructed her that a nurse in the device clinic would be calling her about above message from Dr. Radha Good. She voiced understanding.

## 2023-05-15 NOTE — TELEPHONE ENCOUNTER
Pt spouse has some concerns about pt having SOB, tightens in his stomach and also having hot flashes.

## 2023-05-16 NOTE — TELEPHONE ENCOUNTER
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 No arrhythmias. His average heart rate is above 90bpm.     He was supposed to start corlanor 5mg bid upon d/c due to tachycardia, which he has not yet. BP stable today 120/90 w/ pulse 90's. Sounds like his weight is declining now and he was just having increasing HF symptoms. Do you want me to have him start the corlanor and check labs?

## 2023-05-16 NOTE — TELEPHONE ENCOUNTER
Bio ICD w/ stable lead findings and no arrhythmias. Average rates are in the 90's to low 100's     Patient d/c'd on corlanor for tachycardia, causing high BP readings. BP today 120/90 prior to am meds. Advised to hold the corlanor and take all other medications. Recheck BP in 3 hours. The following morning patient was up feeling well without any complaints of chest pain, shortness of breath, or palpitations. Patient's left subclavian cath site was clean, dry and intact without hematoma. Patient's device was interrogated prior to d/c showing normal function. Patient developed some chest discomfort prior to d/c and EKG was unremarkable. Patient was seen and examined by Dr. Bereket Choudhury and determined stable and ready for discharge. Patient was instructed on the importance of medication compliance and outpatient follow up. The patient's BB was increased and he was started on corlanor prior to d/c due to tachycardia. Patient has been scheduled for follow-up with [facility] device clinic Monday May 22 at [address] [facility] office.

## 2023-05-17 DIAGNOSIS — I50.22 CHRONIC HFREF (HEART FAILURE WITH REDUCED EJECTION FRACTION) (HCC): ICD-10-CM

## 2023-05-17 LAB
ANION GAP SERPL CALC-SCNC: 1 MMOL/L (ref 2–11)
BUN SERPL-MCNC: 18 MG/DL (ref 6–23)
CALCIUM SERPL-MCNC: 8.8 MG/DL (ref 8.3–10.4)
CHLORIDE SERPL-SCNC: 112 MMOL/L (ref 101–110)
CO2 SERPL-SCNC: 23 MMOL/L (ref 21–32)
CREAT SERPL-MCNC: 1.1 MG/DL (ref 0.8–1.5)
GLUCOSE SERPL-MCNC: 83 MG/DL (ref 65–100)
POTASSIUM SERPL-SCNC: 3.9 MMOL/L (ref 3.5–5.1)
SODIUM SERPL-SCNC: 136 MMOL/L (ref 133–143)

## 2023-05-23 ENCOUNTER — NURSE ONLY (OUTPATIENT)
Age: 53
End: 2023-05-23

## 2023-05-23 NOTE — PROGRESS NOTES
Patient seen for device check. Full report in [place]. Patient had complaints of SOB since procedure. HR avg 110-120. He never started Corlanor as recommended due to cost. Given samples today to allow time to have prior auth completed. Patient will call with any additional questions or concerns.

## 2023-05-30 ENCOUNTER — TELEPHONE (OUTPATIENT)
Age: 53
End: 2023-05-30

## 2023-05-30 ENCOUNTER — HOSPITAL ENCOUNTER (INPATIENT)
Age: 53
LOS: 2 days | Discharge: HOME | DRG: 286 | End: 2023-06-02
Attending: EMERGENCY MEDICINE | Admitting: INTERNAL MEDICINE
Payer: COMMERCIAL

## 2023-05-30 ENCOUNTER — APPOINTMENT (OUTPATIENT)
Dept: GENERAL RADIOLOGY | Age: 53
DRG: 286 | End: 2023-05-30
Payer: COMMERCIAL

## 2023-05-30 DIAGNOSIS — R06.02 SHORTNESS OF BREATH: ICD-10-CM

## 2023-05-30 DIAGNOSIS — I50.9 ACUTE ON CHRONIC CONGESTIVE HEART FAILURE, UNSPECIFIED HEART FAILURE TYPE (HCC): ICD-10-CM

## 2023-05-30 DIAGNOSIS — I50.23 ACUTE ON CHRONIC SYSTOLIC (CONGESTIVE) HEART FAILURE (HCC): Primary | ICD-10-CM

## 2023-05-30 DIAGNOSIS — I21.4 NSTEMI (NON-ST ELEVATED MYOCARDIAL INFARCTION) (HCC): ICD-10-CM

## 2023-05-30 LAB
ALBUMIN SERPL-MCNC: 2.6 G/DL (ref 3.5–5)
ALBUMIN/GLOB SERPL: 0.8 (ref 0.4–1.6)
ALP SERPL-CCNC: 55 U/L (ref 50–136)
ALT SERPL-CCNC: 26 U/L (ref 12–65)
ANION GAP SERPL CALC-SCNC: 5 MMOL/L (ref 2–11)
AST SERPL-CCNC: 28 U/L (ref 15–37)
BASOPHILS # BLD: 0.1 K/UL (ref 0–0.2)
BASOPHILS NFR BLD: 1 % (ref 0–2)
BILIRUB SERPL-MCNC: 1.3 MG/DL (ref 0.2–1.1)
BUN SERPL-MCNC: 17 MG/DL (ref 6–23)
CALCIUM SERPL-MCNC: 8.6 MG/DL (ref 8.3–10.4)
CHLORIDE SERPL-SCNC: 114 MMOL/L (ref 101–110)
CO2 SERPL-SCNC: 22 MMOL/L (ref 21–32)
CREAT SERPL-MCNC: 1.4 MG/DL (ref 0.8–1.5)
DIFFERENTIAL METHOD BLD: NORMAL
EOSINOPHIL # BLD: 0.2 K/UL (ref 0–0.8)
EOSINOPHIL NFR BLD: 4 % (ref 0.5–7.8)
ERYTHROCYTE [DISTWIDTH] IN BLOOD BY AUTOMATED COUNT: 13.3 % (ref 11.9–14.6)
GLOBULIN SER CALC-MCNC: 3.4 G/DL (ref 2.8–4.5)
GLUCOSE SERPL-MCNC: 121 MG/DL (ref 65–100)
HCT VFR BLD AUTO: 44 % (ref 41.1–50.3)
HGB BLD-MCNC: 14.8 G/DL (ref 13.6–17.2)
IMM GRANULOCYTES # BLD AUTO: 0 K/UL (ref 0–0.5)
IMM GRANULOCYTES NFR BLD AUTO: 0 % (ref 0–5)
LYMPHOCYTES # BLD: 2.6 K/UL (ref 0.5–4.6)
LYMPHOCYTES NFR BLD: 41 % (ref 13–44)
MAGNESIUM SERPL-MCNC: 1.6 MG/DL (ref 1.8–2.4)
MCH RBC QN AUTO: 31.2 PG (ref 26.1–32.9)
MCHC RBC AUTO-ENTMCNC: 33.6 G/DL (ref 31.4–35)
MCV RBC AUTO: 92.6 FL (ref 82–102)
MONOCYTES # BLD: 0.4 K/UL (ref 0.1–1.3)
MONOCYTES NFR BLD: 6 % (ref 4–12)
NEUTS SEG # BLD: 3.1 K/UL (ref 1.7–8.2)
NEUTS SEG NFR BLD: 48 % (ref 43–78)
NRBC # BLD: 0 K/UL (ref 0–0.2)
NT PRO BNP: 5074 PG/ML (ref 5–125)
PLATELET # BLD AUTO: 167 K/UL (ref 150–450)
PMV BLD AUTO: 11.6 FL (ref 9.4–12.3)
POTASSIUM SERPL-SCNC: 4 MMOL/L (ref 3.5–5.1)
PROT SERPL-MCNC: 6 G/DL (ref 6.3–8.2)
RBC # BLD AUTO: 4.75 M/UL (ref 4.23–5.6)
SODIUM SERPL-SCNC: 141 MMOL/L (ref 133–143)
TROPONIN I SERPL HS-MCNC: 8961.2 PG/ML (ref 0–57)
TROPONIN I SERPL HS-MCNC: 9114.1 PG/ML (ref 0–57)
WBC # BLD AUTO: 6.3 K/UL (ref 4.3–11.1)

## 2023-05-30 PROCEDURE — 80053 COMPREHEN METABOLIC PANEL: CPT

## 2023-05-30 PROCEDURE — 84484 ASSAY OF TROPONIN QUANT: CPT

## 2023-05-30 PROCEDURE — 83735 ASSAY OF MAGNESIUM: CPT

## 2023-05-30 PROCEDURE — 2580000003 HC RX 258: Performed by: EMERGENCY MEDICINE

## 2023-05-30 PROCEDURE — 93005 ELECTROCARDIOGRAM TRACING: CPT | Performed by: EMERGENCY MEDICINE

## 2023-05-30 PROCEDURE — 6370000000 HC RX 637 (ALT 250 FOR IP): Performed by: PHYSICIAN ASSISTANT

## 2023-05-30 PROCEDURE — 6360000002 HC RX W HCPCS: Performed by: PHYSICIAN ASSISTANT

## 2023-05-30 PROCEDURE — G0378 HOSPITAL OBSERVATION PER HR: HCPCS

## 2023-05-30 PROCEDURE — 96374 THER/PROPH/DIAG INJ IV PUSH: CPT

## 2023-05-30 PROCEDURE — 94761 N-INVAS EAR/PLS OXIMETRY MLT: CPT

## 2023-05-30 PROCEDURE — 83880 ASSAY OF NATRIURETIC PEPTIDE: CPT

## 2023-05-30 PROCEDURE — 2580000003 HC RX 258: Performed by: PHYSICIAN ASSISTANT

## 2023-05-30 PROCEDURE — 99285 EMERGENCY DEPT VISIT HI MDM: CPT

## 2023-05-30 PROCEDURE — 99223 1ST HOSP IP/OBS HIGH 75: CPT | Performed by: INTERNAL MEDICINE

## 2023-05-30 PROCEDURE — 96376 TX/PRO/DX INJ SAME DRUG ADON: CPT

## 2023-05-30 PROCEDURE — 85025 COMPLETE CBC W/AUTO DIFF WBC: CPT

## 2023-05-30 PROCEDURE — 71045 X-RAY EXAM CHEST 1 VIEW: CPT

## 2023-05-30 PROCEDURE — 96375 TX/PRO/DX INJ NEW DRUG ADDON: CPT

## 2023-05-30 RX ORDER — FUROSEMIDE 10 MG/ML
40 INJECTION INTRAMUSCULAR; INTRAVENOUS ONCE
Status: COMPLETED | OUTPATIENT
Start: 2023-05-30 | End: 2023-05-30

## 2023-05-30 RX ORDER — ACETAMINOPHEN 325 MG/1
650 TABLET ORAL EVERY 6 HOURS PRN
Status: DISCONTINUED | OUTPATIENT
Start: 2023-05-30 | End: 2023-06-02 | Stop reason: HOSPADM

## 2023-05-30 RX ORDER — METOPROLOL SUCCINATE 25 MG/1
50 TABLET, EXTENDED RELEASE ORAL 2 TIMES DAILY
Status: DISCONTINUED | OUTPATIENT
Start: 2023-05-30 | End: 2023-06-02 | Stop reason: HOSPADM

## 2023-05-30 RX ORDER — SPIRONOLACTONE 25 MG/1
25 TABLET ORAL DAILY
Status: DISCONTINUED | OUTPATIENT
Start: 2023-05-30 | End: 2023-06-02 | Stop reason: HOSPADM

## 2023-05-30 RX ORDER — FUROSEMIDE 10 MG/ML
40 INJECTION INTRAMUSCULAR; INTRAVENOUS 2 TIMES DAILY
Status: DISCONTINUED | OUTPATIENT
Start: 2023-05-30 | End: 2023-06-02

## 2023-05-30 RX ORDER — SODIUM CHLORIDE 0.9 % (FLUSH) 0.9 %
5-40 SYRINGE (ML) INJECTION EVERY 12 HOURS SCHEDULED
Status: DISCONTINUED | OUTPATIENT
Start: 2023-05-30 | End: 2023-06-02 | Stop reason: HOSPADM

## 2023-05-30 RX ORDER — PRAVASTATIN SODIUM 20 MG
10 TABLET ORAL DAILY
Status: DISCONTINUED | OUTPATIENT
Start: 2023-05-30 | End: 2023-06-02 | Stop reason: HOSPADM

## 2023-05-30 RX ORDER — SODIUM CHLORIDE 0.9 % (FLUSH) 0.9 %
5 SYRINGE (ML) INJECTION EVERY 8 HOURS
Status: DISCONTINUED | OUTPATIENT
Start: 2023-05-30 | End: 2023-05-31

## 2023-05-30 RX ORDER — ONDANSETRON 4 MG/1
4 TABLET, ORALLY DISINTEGRATING ORAL EVERY 8 HOURS PRN
Status: DISCONTINUED | OUTPATIENT
Start: 2023-05-30 | End: 2023-06-02 | Stop reason: HOSPADM

## 2023-05-30 RX ORDER — POLYETHYLENE GLYCOL 3350 17 G/17G
17 POWDER, FOR SOLUTION ORAL DAILY PRN
Status: DISCONTINUED | OUTPATIENT
Start: 2023-05-30 | End: 2023-06-02 | Stop reason: HOSPADM

## 2023-05-30 RX ORDER — SODIUM CHLORIDE 0.9 % (FLUSH) 0.9 %
5-40 SYRINGE (ML) INJECTION PRN
Status: DISCONTINUED | OUTPATIENT
Start: 2023-05-30 | End: 2023-06-02 | Stop reason: HOSPADM

## 2023-05-30 RX ORDER — SODIUM CHLORIDE 9 MG/ML
INJECTION, SOLUTION INTRAVENOUS PRN
Status: DISCONTINUED | OUTPATIENT
Start: 2023-05-30 | End: 2023-06-02 | Stop reason: HOSPADM

## 2023-05-30 RX ORDER — ONDANSETRON 2 MG/ML
4 INJECTION INTRAMUSCULAR; INTRAVENOUS EVERY 6 HOURS PRN
Status: DISCONTINUED | OUTPATIENT
Start: 2023-05-30 | End: 2023-06-02 | Stop reason: HOSPADM

## 2023-05-30 RX ORDER — SODIUM CHLORIDE 0.9 % (FLUSH) 0.9 %
5 SYRINGE (ML) INJECTION AS NEEDED
Status: DISCONTINUED | OUTPATIENT
Start: 2023-05-30 | End: 2023-06-02 | Stop reason: HOSPADM

## 2023-05-30 RX ORDER — ASPIRIN 81 MG/1
81 TABLET, CHEWABLE ORAL DAILY
Status: DISCONTINUED | OUTPATIENT
Start: 2023-05-30 | End: 2023-06-02 | Stop reason: HOSPADM

## 2023-05-30 RX ORDER — ISOSORBIDE MONONITRATE 30 MG/1
30 TABLET, EXTENDED RELEASE ORAL DAILY
Status: DISCONTINUED | OUTPATIENT
Start: 2023-05-30 | End: 2023-06-02 | Stop reason: HOSPADM

## 2023-05-30 RX ORDER — AMLODIPINE BESYLATE 10 MG/1
10 TABLET ORAL DAILY
Status: DISCONTINUED | OUTPATIENT
Start: 2023-05-30 | End: 2023-06-02 | Stop reason: HOSPADM

## 2023-05-30 RX ADMIN — EMPAGLIFLOZIN 10 MG: 10 TABLET, FILM COATED ORAL at 20:32

## 2023-05-30 RX ADMIN — ASPIRIN 81 MG 81 MG: 81 TABLET ORAL at 20:33

## 2023-05-30 RX ADMIN — AMLODIPINE BESYLATE 10 MG: 10 TABLET ORAL at 20:32

## 2023-05-30 RX ADMIN — PRAVASTATIN SODIUM 10 MG: 20 TABLET ORAL at 21:12

## 2023-05-30 RX ADMIN — FUROSEMIDE 40 MG: 40 INJECTION, SOLUTION INTRAMUSCULAR; INTRAVENOUS at 12:26

## 2023-05-30 RX ADMIN — METOPROLOL SUCCINATE 50 MG: 25 TABLET, EXTENDED RELEASE ORAL at 21:13

## 2023-05-30 RX ADMIN — SODIUM CHLORIDE, PRESERVATIVE FREE 5 ML: 5 INJECTION INTRAVENOUS at 12:27

## 2023-05-30 RX ADMIN — ISOSORBIDE MONONITRATE 30 MG: 30 TABLET, EXTENDED RELEASE ORAL at 21:13

## 2023-05-30 RX ADMIN — IVABRADINE 5 MG: 5 TABLET, FILM COATED ORAL at 21:13

## 2023-05-30 RX ADMIN — SODIUM CHLORIDE, PRESERVATIVE FREE 5 ML: 5 INJECTION INTRAVENOUS at 21:13

## 2023-05-30 RX ADMIN — SODIUM CHLORIDE, PRESERVATIVE FREE 10 ML: 5 INJECTION INTRAVENOUS at 21:13

## 2023-05-30 RX ADMIN — FUROSEMIDE 40 MG: 10 INJECTION, SOLUTION INTRAMUSCULAR; INTRAVENOUS at 21:13

## 2023-05-30 RX ADMIN — SPIRONOLACTONE 25 MG: 25 TABLET ORAL at 21:13

## 2023-05-30 ASSESSMENT — ENCOUNTER SYMPTOMS
WHEEZING: 0
ABDOMINAL DISTENTION: 1
NAUSEA: 0
SHORTNESS OF BREATH: 1
COUGH: 0
CHEST TIGHTNESS: 0
EYE REDNESS: 0
DIARRHEA: 0
SORE THROAT: 0
BACK PAIN: 0
VOMITING: 0
ABDOMINAL PAIN: 0

## 2023-05-30 ASSESSMENT — PAIN SCALES - GENERAL
PAINLEVEL_OUTOF10: 0
PAINLEVEL_OUTOF10: 0

## 2023-05-30 ASSESSMENT — PAIN - FUNCTIONAL ASSESSMENT: PAIN_FUNCTIONAL_ASSESSMENT: 0-10

## 2023-05-30 NOTE — H&P
[facility] CARDIOLOGY  [address] [facility], [address]  PHONE: [phone]         CONSULT        23      NAME:  Lona Nash  : 1970  MRN: [id number]      SUBJECTIVE:   Lona Nash is a 45 y.o. male seen for a consultation visit regarding the following:     Chief Complaint   Patient presents with    Shortness of Breath            HPI:    75-year-old male with a 1 week history of progressively worsening shortness of breath orthopnea PND. He has been sleeping sitting upright. Feels some better with IV Lasix. He did have a brief episode of chest pain but only while in the emergency department. He describes worsening exertional shortness of breath as well. Said no palpitations or syncope. High-sensitivity troponin is approximately 10,000. Hospital Problems             Last Modified POA    * (Principal) Acute on chronic systolic (congestive) heart failure ([address] 2023 Yes    Acute on chronic congestive heart failure ([address] 2023 Yes     Allergies   Allergen Reactions    Entresto [Sacubitril-Valsartan] Cough    Lisinopril Cough     Past Medical History:   Diagnosis Date    COPD (chronic obstructive pulmonary disease) ([address]     Hypertension      Past Surgical History:   Procedure Laterality Date    EP DEVICE PROCEDURE N/A 2023    Insert ICD dual performed by Melinda Walter MD at [address] [place] [address] LAB     History reviewed. No pertinent family history. Social History     Tobacco Use    Smoking status: Every Day     Passive exposure: Current    Smokeless tobacco: Never   Substance Use Topics    Alcohol use: Yes           ROS:    Constitution: Negative for fever. Eyes: Negative for blurred vision. Respiratory: Negative for cough. Endocrine: Negative for cold intolerance and heat intolerance. Skin: Negative for rash. Musculoskeletal: Negative for myalgias. Gastrointestinal: Negative for diarrhea, nausea and vomiting.    Genitourinary: Negative for

## 2023-05-30 NOTE — TELEPHONE ENCOUNTER
Bio remote reviewed. Patient has single icd w/ only brief NST episodes. Patient w/ 0% RV pacing. Average rates in the 90's.      No device abnormalities

## 2023-05-30 NOTE — ED NOTES
I have reviewed discharge instructions with the patient. The patient verbalized understanding. Patient left ED via Discharge Method: ambulatory to Home with self. Opportunity for questions and clarification provided. Patient given 0 scripts. To continue your aftercare when you leave the hospital, you may receive an automated call from our care team to check in on how you are doing. This is a free service and part of our promise to provide the best care and service to meet your aftercare needs.  If you have questions, or wish to unsubscribe from this service please call [phone]. Thank you for Choosing our [facility] Emergency Department.         Daniel Mckeon RN  05/30/23 9158

## 2023-05-30 NOTE — ED TRIAGE NOTES
Pt reports dyspnea worsened over last week \"like fluid\".  Hx COPD, HTN, ICD  (-)cp, cough, fever  A&Ox4

## 2023-05-30 NOTE — TELEPHONE ENCOUNTER
Pt's wife states pt is \"real short of breath. \" States she was about to take pt to ER. Agree with wife if she believes pt needs to go to ER, she should take to [facility] ER. Request call back after d/c. Verb understanding.

## 2023-05-30 NOTE — DISCHARGE INSTRUCTIONS
miss a dose? Take the missed dose as soon as you remember. Skip the missed dose if it is almost time for your next scheduled dose. Do not take extra medicine to make up the missed dose. What happens if I overdose? Seek emergency medical attention or call the Poison Help line at 1-170.583.7514. Overdose symptoms may include rapid heartbeats, warmth or tingling under your skin, chest pain, or fainting. What should I avoid while taking hydralazine? Avoid getting up too fast from a sitting or lying position, or you may feel dizzy. Get up slowly and steady yourself to prevent a fall. What are the possible side effects of hydralazine? Get emergency medical help if you have signs of an allergic reaction:  hives; difficult breathing; swelling of your face, lips, tongue, or throat. Call your doctor at once if you have:  chest pain or pressure, pain spreading to your jaw or shoulder;  fast or pounding heartbeats;  a light-headed feeling, like you might pass out;  numbness, tingling, or burning pain in your hands or feet;  painful or difficult urination;  little or no urination; or  lupus-like syndrome --joint pain or swelling with fever, swollen glands, muscle aches, chest pain, vomiting, unusual thoughts or behavior, and patchy skin color. Common side effects may include:  chest pain, fast heart rate;  headache; or  nausea, vomiting, diarrhea, loss of appetite. This is not a complete list of side effects and others may occur. Call your doctor for medical advice about side effects. You may report side effects to FDA at 9-264-VTI-4235. What other drugs will affect hydralazine? Tell your doctor about all your current medicines and any you start or stop using, especially:  diazoxide (an injectable blood pressure medication); or  an MAO inhibitor --isocarboxazid, linezolid, methylene blue injection, phenelzine, rasagiline, selegiline, tranylcypromine, and others. This list is not complete.  Other drugs may interact

## 2023-05-30 NOTE — ED PROVIDER NOTES
121 (H) 65 - 100 mg/dL    BUN 17 6 - 23 MG/DL    Creatinine 1.40 0.8 - 1.5 MG/DL    Est, Glom Filt Rate 60 (L) >60 ml/min/1.73m2    Calcium 8.6 8.3 - 10.4 MG/DL    Total Bilirubin 1.3 (H) 0.2 - 1.1 MG/DL    ALT 26 12 - 65 U/L    AST 28 15 - 37 U/L    Alk Phosphatase 55 50 - 136 U/L    Total Protein 6.0 (L) 6.3 - 8.2 g/dL    Albumin 2.6 (L) 3.5 - 5.0 g/dL    Globulin 3.4 2.8 - 4.5 g/dL    Albumin/Globulin Ratio 0.8 0.4 - 1.6     Magnesium   Result Value Ref Range    Magnesium 1.6 (L) 1.8 - 2.4 mg/dL   Brain Natriuretic Peptide   Result Value Ref Range    NT Pro-BNP 5,074 (H) 5 - 125 PG/ML   Troponin   Result Value Ref Range    Troponin, High Sensitivity 9,114.1 (HH) 0 - 57 pg/mL   Troponin   Result Value Ref Range    Troponin, High Sensitivity 8,961.2 (HH) 0 - 57 pg/mL   EKG 12 Lead   Result Value Ref Range    Ventricular Rate 107 BPM    Atrial Rate 107 BPM    P-R Interval 160 ms    QRS Duration 104 ms    Q-T Interval 372 ms    QTc Calculation (Bazett) 496 ms    P Axis 42 degrees    R Axis -69 degrees    T Axis 82 degrees    Diagnosis       Sinus tachycardia  Biatrial enlargement  Left axis deviation  Cannot rule out Inferior infarct , age undetermined  Anterior infarct (cited on or before 10-MAY-2023)  ST & T wave abnormality, consider lateral ischemia  Abnormal ECG  When compared with ECG of 30-MAY-2023 10:55,  Serial changes of Anterior infarct Present          XR CHEST PORTABLE   Final Result   1. Mild interstitial prominence could represent underlying chronic change versus   recurrent interstitial edema. 2. Mildly enlarged cardiac silhouette and cardiac pacer/AICD device. Voice dictation software was used during the making of this note. This software is not perfect and grammatical and other typographical errors may be present. This note has not been completely proofread for errors.      Alex MORALESma  05/30/23 86

## 2023-05-30 NOTE — TELEPHONE ENCOUNTER
Patient's wife LVM stating the patient has been having trouble breathing since having a device inserted.

## 2023-05-31 PROBLEM — I50.9 HEART FAILURE (HCC): Status: ACTIVE | Noted: 2023-05-31

## 2023-05-31 LAB
ANION GAP SERPL CALC-SCNC: 6 MMOL/L (ref 2–11)
BUN SERPL-MCNC: 21 MG/DL (ref 6–23)
CALCIUM SERPL-MCNC: 8.4 MG/DL (ref 8.3–10.4)
CHLORIDE SERPL-SCNC: 111 MMOL/L (ref 101–110)
CO2 SERPL-SCNC: 22 MMOL/L (ref 21–32)
CREAT SERPL-MCNC: 1.5 MG/DL (ref 0.8–1.5)
ECHO BSA: 1.75 M2
EKG ATRIAL RATE: 107 BPM
EKG DIAGNOSIS: NORMAL
EKG P AXIS: 42 DEGREES
EKG P-R INTERVAL: 160 MS
EKG Q-T INTERVAL: 372 MS
EKG QRS DURATION: 104 MS
EKG QTC CALCULATION (BAZETT): 496 MS
EKG R AXIS: -69 DEGREES
EKG T AXIS: 82 DEGREES
EKG VENTRICULAR RATE: 107 BPM
GLUCOSE SERPL-MCNC: 96 MG/DL (ref 65–100)
MAGNESIUM SERPL-MCNC: 1.5 MG/DL (ref 1.8–2.4)
POTASSIUM SERPL-SCNC: 3.7 MMOL/L (ref 3.5–5.1)
SODIUM SERPL-SCNC: 139 MMOL/L (ref 133–143)

## 2023-05-31 PROCEDURE — 6370000000 HC RX 637 (ALT 250 FOR IP): Performed by: PHYSICIAN ASSISTANT

## 2023-05-31 PROCEDURE — 2500000003 HC RX 250 WO HCPCS: Performed by: INTERNAL MEDICINE

## 2023-05-31 PROCEDURE — 6360000002 HC RX W HCPCS: Performed by: INTERNAL MEDICINE

## 2023-05-31 PROCEDURE — 93458 L HRT ARTERY/VENTRICLE ANGIO: CPT | Performed by: INTERNAL MEDICINE

## 2023-05-31 PROCEDURE — 6360000002 HC RX W HCPCS: Performed by: PHYSICIAN ASSISTANT

## 2023-05-31 PROCEDURE — 6360000002 HC RX W HCPCS: Performed by: NURSE PRACTITIONER

## 2023-05-31 PROCEDURE — 96376 TX/PRO/DX INJ SAME DRUG ADON: CPT

## 2023-05-31 PROCEDURE — 6370000000 HC RX 637 (ALT 250 FOR IP): Performed by: NURSE PRACTITIONER

## 2023-05-31 PROCEDURE — 1100000003 HC PRIVATE W/ TELEMETRY

## 2023-05-31 PROCEDURE — 6360000004 HC RX CONTRAST MEDICATION: Performed by: INTERNAL MEDICINE

## 2023-05-31 PROCEDURE — 4A023N7 MEASUREMENT OF CARDIAC SAMPLING AND PRESSURE, LEFT HEART, PERCUTANEOUS APPROACH: ICD-10-PCS | Performed by: INTERNAL MEDICINE

## 2023-05-31 PROCEDURE — C1769 GUIDE WIRE: HCPCS | Performed by: INTERNAL MEDICINE

## 2023-05-31 PROCEDURE — 80048 BASIC METABOLIC PNL TOTAL CA: CPT

## 2023-05-31 PROCEDURE — C1894 INTRO/SHEATH, NON-LASER: HCPCS | Performed by: INTERNAL MEDICINE

## 2023-05-31 PROCEDURE — 2580000003 HC RX 258: Performed by: PHYSICIAN ASSISTANT

## 2023-05-31 PROCEDURE — 96365 THER/PROPH/DIAG IV INF INIT: CPT

## 2023-05-31 PROCEDURE — 93010 ELECTROCARDIOGRAM REPORT: CPT | Performed by: INTERNAL MEDICINE

## 2023-05-31 PROCEDURE — 99152 MOD SED SAME PHYS/QHP 5/>YRS: CPT | Performed by: INTERNAL MEDICINE

## 2023-05-31 PROCEDURE — 2709999900 HC NON-CHARGEABLE SUPPLY: Performed by: INTERNAL MEDICINE

## 2023-05-31 PROCEDURE — 96366 THER/PROPH/DIAG IV INF ADDON: CPT

## 2023-05-31 PROCEDURE — 83735 ASSAY OF MAGNESIUM: CPT

## 2023-05-31 PROCEDURE — 36415 COLL VENOUS BLD VENIPUNCTURE: CPT

## 2023-05-31 PROCEDURE — B2111ZZ FLUOROSCOPY OF MULTIPLE CORONARY ARTERIES USING LOW OSMOLAR CONTRAST: ICD-10-PCS | Performed by: INTERNAL MEDICINE

## 2023-05-31 RX ORDER — MIDAZOLAM HYDROCHLORIDE 1 MG/ML
INJECTION INTRAMUSCULAR; INTRAVENOUS PRN
Status: DISCONTINUED | OUTPATIENT
Start: 2023-05-31 | End: 2023-05-31 | Stop reason: HOSPADM

## 2023-05-31 RX ORDER — POTASSIUM CHLORIDE 20 MEQ/1
40 TABLET, EXTENDED RELEASE ORAL PRN
Status: DISCONTINUED | OUTPATIENT
Start: 2023-05-31 | End: 2023-06-02 | Stop reason: HOSPADM

## 2023-05-31 RX ORDER — SODIUM CHLORIDE 0.9 % (FLUSH) 0.9 %
5-40 SYRINGE (ML) INJECTION EVERY 12 HOURS SCHEDULED
Status: DISCONTINUED | OUTPATIENT
Start: 2023-05-31 | End: 2023-06-02 | Stop reason: HOSPADM

## 2023-05-31 RX ORDER — SODIUM CHLORIDE 9 MG/ML
INJECTION, SOLUTION INTRAVENOUS PRN
Status: DISCONTINUED | OUTPATIENT
Start: 2023-05-31 | End: 2023-06-02 | Stop reason: HOSPADM

## 2023-05-31 RX ORDER — LIDOCAINE HYDROCHLORIDE 10 MG/ML
INJECTION, SOLUTION INFILTRATION; PERINEURAL PRN
Status: DISCONTINUED | OUTPATIENT
Start: 2023-05-31 | End: 2023-05-31 | Stop reason: HOSPADM

## 2023-05-31 RX ORDER — MAGNESIUM SULFATE IN WATER 40 MG/ML
2000 INJECTION, SOLUTION INTRAVENOUS PRN
Status: DISCONTINUED | OUTPATIENT
Start: 2023-05-31 | End: 2023-06-02 | Stop reason: HOSPADM

## 2023-05-31 RX ORDER — TRAZODONE HYDROCHLORIDE 50 MG/1
50 TABLET ORAL NIGHTLY PRN
Status: DISCONTINUED | OUTPATIENT
Start: 2023-05-31 | End: 2023-06-02 | Stop reason: HOSPADM

## 2023-05-31 RX ORDER — ACETAMINOPHEN 325 MG/1
650 TABLET ORAL EVERY 4 HOURS PRN
Status: DISCONTINUED | OUTPATIENT
Start: 2023-05-31 | End: 2023-06-02 | Stop reason: HOSPADM

## 2023-05-31 RX ORDER — HEPARIN SODIUM 200 [USP'U]/100ML
INJECTION, SOLUTION INTRAVENOUS CONTINUOUS PRN
Status: DISCONTINUED | OUTPATIENT
Start: 2023-05-31 | End: 2023-05-31 | Stop reason: HOSPADM

## 2023-05-31 RX ORDER — POTASSIUM CHLORIDE 7.45 MG/ML
10 INJECTION INTRAVENOUS PRN
Status: DISCONTINUED | OUTPATIENT
Start: 2023-05-31 | End: 2023-06-02 | Stop reason: HOSPADM

## 2023-05-31 RX ORDER — SODIUM CHLORIDE 0.9 % (FLUSH) 0.9 %
5-40 SYRINGE (ML) INJECTION PRN
Status: DISCONTINUED | OUTPATIENT
Start: 2023-05-31 | End: 2023-06-02 | Stop reason: HOSPADM

## 2023-05-31 RX ORDER — MAGNESIUM SULFATE IN WATER 40 MG/ML
2000 INJECTION, SOLUTION INTRAVENOUS ONCE
Status: DISCONTINUED | OUTPATIENT
Start: 2023-05-31 | End: 2023-06-02 | Stop reason: HOSPADM

## 2023-05-31 RX ADMIN — AMLODIPINE BESYLATE 10 MG: 10 TABLET ORAL at 08:46

## 2023-05-31 RX ADMIN — FUROSEMIDE 40 MG: 10 INJECTION, SOLUTION INTRAMUSCULAR; INTRAVENOUS at 16:56

## 2023-05-31 RX ADMIN — EMPAGLIFLOZIN 10 MG: 10 TABLET, FILM COATED ORAL at 08:46

## 2023-05-31 RX ADMIN — IVABRADINE 5 MG: 5 TABLET, FILM COATED ORAL at 16:56

## 2023-05-31 RX ADMIN — SODIUM CHLORIDE, PRESERVATIVE FREE 10 ML: 5 INJECTION INTRAVENOUS at 20:44

## 2023-05-31 RX ADMIN — METOPROLOL SUCCINATE 50 MG: 25 TABLET, EXTENDED RELEASE ORAL at 20:44

## 2023-05-31 RX ADMIN — IVABRADINE 5 MG: 5 TABLET, FILM COATED ORAL at 08:46

## 2023-05-31 RX ADMIN — SPIRONOLACTONE 25 MG: 25 TABLET ORAL at 08:46

## 2023-05-31 RX ADMIN — PRAVASTATIN SODIUM 10 MG: 20 TABLET ORAL at 08:46

## 2023-05-31 RX ADMIN — FUROSEMIDE 40 MG: 10 INJECTION, SOLUTION INTRAMUSCULAR; INTRAVENOUS at 08:50

## 2023-05-31 RX ADMIN — SODIUM CHLORIDE, PRESERVATIVE FREE 5 ML: 5 INJECTION INTRAVENOUS at 01:56

## 2023-05-31 RX ADMIN — ASPIRIN 81 MG 81 MG: 81 TABLET ORAL at 08:46

## 2023-05-31 RX ADMIN — ISOSORBIDE MONONITRATE 30 MG: 30 TABLET, EXTENDED RELEASE ORAL at 08:46

## 2023-05-31 RX ADMIN — SODIUM CHLORIDE, PRESERVATIVE FREE 10 ML: 5 INJECTION INTRAVENOUS at 08:45

## 2023-05-31 RX ADMIN — TRAZODONE HYDROCHLORIDE 50 MG: 50 TABLET ORAL at 00:42

## 2023-05-31 RX ADMIN — MAGNESIUM SULFATE HEPTAHYDRATE 2000 MG: 40 INJECTION, SOLUTION INTRAVENOUS at 06:19

## 2023-05-31 RX ADMIN — METOPROLOL SUCCINATE 50 MG: 25 TABLET, EXTENDED RELEASE ORAL at 08:46

## 2023-05-31 ASSESSMENT — PAIN SCALES - GENERAL
PAINLEVEL_OUTOF10: 0

## 2023-05-31 NOTE — ED NOTES
TRANSFER - OUT REPORT:    Verbal report given to Cat RN on Laurence Corbin  being transferred to [facility] for routine progression of patient care       Report consisted of patient's Situation, Background, Assessment and   Recommendations(SBAR). Information from the following report(s) ED SBAR was reviewed with the receiving nurse. [place] Assessment: Presents to emergency department  because of falls (Syncope, seizure, or loss of consciousness): No, Age > 79: No, Altered Mental Status, Intoxication with alcohol or substance confusion (Disorientation, impaired judgment, poor safety awaremess, or inability to follow instructions): No, Impaired Mobility: Ambulates or transfers with assistive devices or assistance; Unable to ambulate or transer.: No, Nursing Judgement: No  Lines:   Peripheral IV 05/30/23 Left;Proximal Forearm (Active)        Opportunity for questions and clarification was provided.       Patient transported with:  Registered Nurse          Bang Richard RN  05/30/23 2027

## 2023-05-31 NOTE — PROGRESS NOTES
Radial compression band removed at 1730 after slowly reducing air from 12 cc to zero as per hospital protocol. No bleeding or hematoma noted. 2 x 2 gauze with tegaderm placed over puncture site. The affected extremity is warm and dry to the touch. Patient instructed to call if any bleeding noted on gauze. Patient verbalized understanding the nursing instructions.

## 2023-05-31 NOTE — CARE COORDINATION
Pt presented to [facility] for progressively worsening SOB, orthopnea and PND. Pt admitted for acute on chronic CHF. Pt scheduled for [facility] with possible angioplasty today. PTA, pt indep with his ADLs. Lives with spouse. On RA. Denies DME need and home care services. PCP confirmed. [employer] verified and able to afford home meds. 05/31/23 1204   Service Assessment   Patient Orientation Alert and Oriented   Cognition Alert   History Provided By Patient   Primary Caregiver Self   Support Systems Spouse/Significant Other;Children;Family Members;[employer];Friends/Neighbors   PCP Verified by CM Yes  (Bethi)   Prior Functional Level Independent in ADLs/IADLs   Current Functional Level Independent in ADLs/IADLs   Can patient return to prior living arrangement Yes   Ability to make needs known: Good   Family able to assist with home care needs: Yes   Would you like for me to discuss the discharge plan with any other family members/significant others, and if so, who? No   Financial Resources Other (Comment)  ([employer])   Community Resources None   Social/Functional History   Lives With Spouse   Type of [address] [address] One level   Home Equipment None   Receives Help From Family   ADL Assistance Independent   Ambulation Assistance Independent   Active  Yes   Mode of Transportation Car   Occupation Unemployed   Discharge Planning   Type of Residence Trailer/Mobile Home   Living Arrangements Spouse/Significant Other   Current Services Prior To Admission None   Potential Assistance Needed N/A   DME Ordered? No   Potential Assistance Purchasing Medications No   Type of Home Care Services None   Patient expects to be discharged to: Trailer/mobile home   Services At/After Discharge   Transition of Care Consult (CM Consult) Discharge [address] Discharge None   [facility] Information Provided?  No   Mode of Transport at Discharge Other (see comment)  (Family)

## 2023-05-31 NOTE — PROGRESS NOTES
TRANSFER - IN REPORT:    Verbal report received from DANIE Plummer on Sharolyn Cons being received from [address] [place] [address] for routine progression of patient care. Report consisted of patients Situation, Background, Assessment and Recommendations(SBAR). Information from the following report(s) SBAR, Procedure Summary, Intake/Output, MAR, and Recent Results was reviewed with the receiving nurse. Opportunity for questions and clarification was provided. Assessment completed upon patients arrival to unit and care assumed.

## 2023-05-31 NOTE — PROGRESS NOTES
Chf-  worse. Left heart catheterization with possible angioplasty and alternative therapies discussed. Risks and benefits of the procedure including bleeding, arterial injury, infection, MI, stoke, death, emergent cabg, acute renal failure, contrast allergic reaction were discussed and questions answered.

## 2023-05-31 NOTE — PLAN OF CARE
Problem: Discharge Planning  Goal: Discharge to home or other facility with appropriate resources  Outcome: Progressing  Flowsheets (Taken 5/30/2023 2044)  Discharge to home or other facility with appropriate resources:   Identify barriers to discharge with patient and caregiver   Arrange for needed discharge resources and transportation as appropriate   Identify discharge learning needs (meds, wound care, etc)   Arrange for interpreters to assist at discharge as needed   Refer to discharge planning if patient needs post-hospital services based on physician order or complex needs related to functional status, cognitive ability or social support system     Problem: Pain  Goal: Verbalizes/displays adequate comfort level or baseline comfort level  Outcome: Progressing  Flowsheets (Taken 5/30/2023 2044)  Verbalizes/displays adequate comfort level or baseline comfort level:   Encourage patient to monitor pain and request assistance   Assess pain using appropriate pain scale   Administer analgesics based on type and severity of pain and evaluate response     Problem: ABCDS Injury Assessment  Goal: Absence of physical injury  Outcome: Progressing

## 2023-05-31 NOTE — PROGRESS NOTES
TRANSFER - OUT REPORT:    Verbal report given to Harish Kumar on Adrianne Butler  being transferred to [facility] for routine progression of patient care       Report consisted of patient's Situation, Background, Assessment and   Recommendations(SBAR). Information from the following report(s) Nurse Handoff Report was reviewed with the receiving nurse. [place] Assessment: Presents to emergency department  because of falls (Syncope, seizure, or loss of consciousness): No, Age > 79: No, Altered Mental Status, Intoxication with alcohol or substance confusion (Disorientation, impaired judgment, poor safety awaremess, or inability to follow instructions): No, Impaired Mobility: Ambulates or transfers with assistive devices or assistance; Unable to ambulate or transer.: No, Nursing Judgement: No  Lines:   Peripheral IV 05/30/23 Left;Proximal Forearm (Active)   Site Assessment Clean, dry & intact 05/31/23 1141   Line Status Flushed;Capped 05/31/23 1141   Line Care Connections checked and tightened;Cap changed 05/31/23 1141   Phlebitis Assessment No symptoms 05/31/23 1141   Infiltration Assessment 0 05/31/23 1141   Alcohol Cap Used Yes 05/31/23 1141   Dressing Status Clean, dry & intact 05/31/23 1141   Dressing Type Transparent 05/31/23 1141        Opportunity for questions and clarification was provided.       Patient transported with:  Registered Nurse    C rosalva Williemae Scale  Diagnostic  RRA - 12    2 mg Versed  25 mcg Fentanyl  5000 units Heparin

## 2023-05-31 NOTE — NURSE NAVIGATOR
CHF teaching completed to Pt's family @ BS. CHF background completed. Refresher Teaching from previous admit emphasis on Call Cardiology STAT ,if any of the following are noted:  Short of Breath; with activity or without/ while reclined, Generalize weakness, edema are noted individually or grouped. Cardiac Diet  and salt/fluid restrictions covered. Daily WTs emphasized. Planner with summarization sheet provided with cardiology service and phone number and bathroom scale offered. Total time @ BS is 1 hour and pt 's FM verbalize understanding/past post test.  Pt instructed to call myself, if any questions occur. Family at bedside. Will follow.

## 2023-05-31 NOTE — PROGRESS NOTES
TRANSFER - IN REPORT:    Verbal report received from DANIE Bowers on Yoselin Mary being received from ED for routine progression of care. Report consisted of patients Situation, Background, Assessment and Recommendations(SBAR). Information from the following report(s) SBAR, ED Summary, MAR, and Cardiac Rhythm Sinus Tachy  was reviewed. Opportunity for questions and clarification was provided. Assessment completed upon patients arrival to unit and care assumed. Patient received to room 325. Patient connected to monitor and assessment completed. Plan of care reviewed. Patient oriented to room and call light. Patient aware to use call light to communicate any chest pain or needs. Admission skin assessment completed with second RN and reveals the following:     Sacrum and heels intact and free of redness. Patient has a left subclavian incision/scar from recent ICD implantation. Left leg has trace edema and right leg has 1+ pitting edema. No other open wounds noted.      Cosign: Darien Hewitt

## 2023-06-01 LAB
ANION GAP SERPL CALC-SCNC: 7 MMOL/L (ref 2–11)
BUN SERPL-MCNC: 22 MG/DL (ref 6–23)
CALCIUM SERPL-MCNC: 8.5 MG/DL (ref 8.3–10.4)
CHLORIDE SERPL-SCNC: 107 MMOL/L (ref 101–110)
CO2 SERPL-SCNC: 26 MMOL/L (ref 21–32)
CREAT SERPL-MCNC: 1.4 MG/DL (ref 0.8–1.5)
GLUCOSE SERPL-MCNC: 86 MG/DL (ref 65–100)
MAGNESIUM SERPL-MCNC: 2.3 MG/DL (ref 1.8–2.4)
POTASSIUM SERPL-SCNC: 3.1 MMOL/L (ref 3.5–5.1)
SODIUM SERPL-SCNC: 140 MMOL/L (ref 133–143)

## 2023-06-01 PROCEDURE — 6370000000 HC RX 637 (ALT 250 FOR IP): Performed by: INTERNAL MEDICINE

## 2023-06-01 PROCEDURE — 36415 COLL VENOUS BLD VENIPUNCTURE: CPT

## 2023-06-01 PROCEDURE — 6370000000 HC RX 637 (ALT 250 FOR IP): Performed by: NURSE PRACTITIONER

## 2023-06-01 PROCEDURE — 99232 SBSQ HOSP IP/OBS MODERATE 35: CPT | Performed by: INTERNAL MEDICINE

## 2023-06-01 PROCEDURE — 6360000002 HC RX W HCPCS: Performed by: PHYSICIAN ASSISTANT

## 2023-06-01 PROCEDURE — 2580000003 HC RX 258: Performed by: PHYSICIAN ASSISTANT

## 2023-06-01 PROCEDURE — 6370000000 HC RX 637 (ALT 250 FOR IP): Performed by: PHYSICIAN ASSISTANT

## 2023-06-01 PROCEDURE — 2580000003 HC RX 258: Performed by: INTERNAL MEDICINE

## 2023-06-01 PROCEDURE — 83735 ASSAY OF MAGNESIUM: CPT

## 2023-06-01 PROCEDURE — 1100000003 HC PRIVATE W/ TELEMETRY

## 2023-06-01 PROCEDURE — 80048 BASIC METABOLIC PNL TOTAL CA: CPT

## 2023-06-01 RX ORDER — HYDRALAZINE HYDROCHLORIDE 25 MG/1
25 TABLET, FILM COATED ORAL EVERY 8 HOURS SCHEDULED
Status: DISCONTINUED | OUTPATIENT
Start: 2023-06-01 | End: 2023-06-02 | Stop reason: HOSPADM

## 2023-06-01 RX ADMIN — METOPROLOL SUCCINATE 50 MG: 25 TABLET, EXTENDED RELEASE ORAL at 08:56

## 2023-06-01 RX ADMIN — SODIUM CHLORIDE, PRESERVATIVE FREE 10 ML: 5 INJECTION INTRAVENOUS at 08:53

## 2023-06-01 RX ADMIN — ISOSORBIDE MONONITRATE 30 MG: 30 TABLET, EXTENDED RELEASE ORAL at 08:56

## 2023-06-01 RX ADMIN — FUROSEMIDE 40 MG: 10 INJECTION, SOLUTION INTRAMUSCULAR; INTRAVENOUS at 08:53

## 2023-06-01 RX ADMIN — POTASSIUM BICARBONATE 40 MEQ: 391 TABLET, EFFERVESCENT ORAL at 06:41

## 2023-06-01 RX ADMIN — EMPAGLIFLOZIN 10 MG: 10 TABLET, FILM COATED ORAL at 08:54

## 2023-06-01 RX ADMIN — ASPIRIN 81 MG 81 MG: 81 TABLET ORAL at 08:54

## 2023-06-01 RX ADMIN — TRAZODONE HYDROCHLORIDE 50 MG: 50 TABLET ORAL at 01:06

## 2023-06-01 RX ADMIN — IVABRADINE 5 MG: 5 TABLET, FILM COATED ORAL at 16:22

## 2023-06-01 RX ADMIN — FUROSEMIDE 40 MG: 10 INJECTION, SOLUTION INTRAMUSCULAR; INTRAVENOUS at 17:36

## 2023-06-01 RX ADMIN — PRAVASTATIN SODIUM 10 MG: 20 TABLET ORAL at 08:54

## 2023-06-01 RX ADMIN — IVABRADINE 5 MG: 5 TABLET, FILM COATED ORAL at 08:54

## 2023-06-01 RX ADMIN — METOPROLOL SUCCINATE 50 MG: 25 TABLET, EXTENDED RELEASE ORAL at 20:48

## 2023-06-01 RX ADMIN — HYDRALAZINE HYDROCHLORIDE 25 MG: 25 TABLET, FILM COATED ORAL at 20:47

## 2023-06-01 RX ADMIN — TRAZODONE HYDROCHLORIDE 50 MG: 50 TABLET ORAL at 22:50

## 2023-06-01 RX ADMIN — SPIRONOLACTONE 25 MG: 25 TABLET ORAL at 08:55

## 2023-06-01 RX ADMIN — SODIUM CHLORIDE, PRESERVATIVE FREE 5 ML: 5 INJECTION INTRAVENOUS at 20:51

## 2023-06-01 ASSESSMENT — PAIN SCALES - GENERAL
PAINLEVEL_OUTOF10: 0

## 2023-06-01 NOTE — PROGRESS NOTES
[address] received request from RN for supper tray for Family Member (5/31). [address] ordered meal.    Rev. Dacia Camara M.Div.

## 2023-06-01 NOTE — PROGRESS NOTES
am  6/1/2023 7:14 AM    Admit Date: 5/30/2023    Admit Diagnosis: NSTEMI (non-ST elevated myocardial infarction) ([facility] [I21.4]  Acute on chronic systolic (congestive) heart failure (HCC) [I50.23]  Acute on chronic congestive heart failure, unspecified heart failure type ([facility] [I50.9]  Heart failure (HCC) [I50.9]      Subjective:    Patient : Patient with a history of HFrEF who underwent cardiac cath yesterday showing normal coronary arteries with severely reduced LV systolic function and moderately elevated filling pressures    Objective:    /88   Pulse 68   Temp 97.5 °F (36.4 °C) (Oral)   Resp 18   Ht 5' 7\" (1.702 m)   Wt 144 lb 9.6 oz (65.6 kg)   SpO2 98%   BMI 22.65 kg/m²     ROS:  General ROS: negative for - chills  Hematological and Lymphatic ROS: negative for - blood clots or jaundice  Respiratory ROS: no cough, shortness of breath, or wheezing  Cardiovascular ROS: no chest pain or dyspnea on exertion  Gastrointestinal ROS: no abdominal pain, change in bowel habits, or black or bloody stools  Neurological ROS: no TIA or stroke symptoms    Physical Exam:      Physical Examination: General appearance - Appearance: alert, well appearing, and in no distress.    Neck/lymph - supple, no significant adenopathy  Chest/CV - clear to auscultation, no wheezes, rales or rhonchi, symmetric air entry  Heart - normal rate, regular rhythm, normal S1, S2, no murmurs, rubs, clicks or gallops  Abdomen/GI - soft, nontender, nondistended, no masses or organomegaly   Musculoskeletal - no joint tenderness, deformity or swelling  Extremities - peripheral pulses normal, no pedal edema, no clubbing or cyanosis  Skin - normal coloration and turgor, no rashes, no suspicious skin lesions noted    Current Facility-Administered Medications   Medication Dose Route Frequency    traZODone (DESYREL) tablet 50 mg  50 mg Oral Nightly PRN    magnesium sulfate 2000 mg in 50 mL IVPB premix  2,000 mg IntraVENous PRN    potassium chloride

## 2023-06-02 ENCOUNTER — TELEPHONE (OUTPATIENT)
Age: 53
End: 2023-06-02

## 2023-06-02 VITALS
SYSTOLIC BLOOD PRESSURE: 113 MMHG | TEMPERATURE: 97.9 F | WEIGHT: 146.6 LBS | RESPIRATION RATE: 18 BRPM | OXYGEN SATURATION: 92 % | DIASTOLIC BLOOD PRESSURE: 85 MMHG | HEIGHT: 67 IN | BODY MASS INDEX: 23.01 KG/M2 | HEART RATE: 79 BPM

## 2023-06-02 PROBLEM — R77.8 ELEVATED TROPONIN: Status: ACTIVE | Noted: 2023-06-02

## 2023-06-02 PROBLEM — R79.89 ELEVATED TROPONIN: Status: ACTIVE | Noted: 2023-06-02

## 2023-06-02 PROBLEM — I10 HYPERTENSION: Status: ACTIVE | Noted: 2022-01-11

## 2023-06-02 LAB
ANION GAP SERPL CALC-SCNC: 7 MMOL/L (ref 2–11)
BUN SERPL-MCNC: 24 MG/DL (ref 6–23)
CALCIUM SERPL-MCNC: 8.3 MG/DL (ref 8.3–10.4)
CHLORIDE SERPL-SCNC: 108 MMOL/L (ref 101–110)
CO2 SERPL-SCNC: 25 MMOL/L (ref 21–32)
CREAT SERPL-MCNC: 1.3 MG/DL (ref 0.8–1.5)
GLUCOSE SERPL-MCNC: 96 MG/DL (ref 65–100)
MAGNESIUM SERPL-MCNC: 2.2 MG/DL (ref 1.8–2.4)
POTASSIUM SERPL-SCNC: 3.7 MMOL/L (ref 3.5–5.1)
SODIUM SERPL-SCNC: 140 MMOL/L (ref 133–143)

## 2023-06-02 PROCEDURE — 2580000003 HC RX 258: Performed by: PHYSICIAN ASSISTANT

## 2023-06-02 PROCEDURE — 6370000000 HC RX 637 (ALT 250 FOR IP): Performed by: INTERNAL MEDICINE

## 2023-06-02 PROCEDURE — 80048 BASIC METABOLIC PNL TOTAL CA: CPT

## 2023-06-02 PROCEDURE — 36415 COLL VENOUS BLD VENIPUNCTURE: CPT

## 2023-06-02 PROCEDURE — 99238 HOSP IP/OBS DSCHRG MGMT 30/<: CPT | Performed by: INTERNAL MEDICINE

## 2023-06-02 PROCEDURE — 6370000000 HC RX 637 (ALT 250 FOR IP): Performed by: PHYSICIAN ASSISTANT

## 2023-06-02 PROCEDURE — 2580000003 HC RX 258: Performed by: INTERNAL MEDICINE

## 2023-06-02 PROCEDURE — 83735 ASSAY OF MAGNESIUM: CPT

## 2023-06-02 RX ORDER — FUROSEMIDE 40 MG/1
40 TABLET ORAL 2 TIMES DAILY
Status: DISCONTINUED | OUTPATIENT
Start: 2023-06-02 | End: 2023-06-02 | Stop reason: HOSPADM

## 2023-06-02 RX ORDER — HYDRALAZINE HYDROCHLORIDE 25 MG/1
25 TABLET, FILM COATED ORAL EVERY 8 HOURS SCHEDULED
Qty: 90 TABLET | Refills: 3 | Status: SHIPPED | OUTPATIENT
Start: 2023-06-02

## 2023-06-02 RX ORDER — FUROSEMIDE 40 MG/1
40 TABLET ORAL 2 TIMES DAILY
Qty: 60 TABLET | Refills: 3 | Status: SHIPPED | OUTPATIENT
Start: 2023-06-02 | End: 2023-06-07

## 2023-06-02 RX ADMIN — IVABRADINE 5 MG: 5 TABLET, FILM COATED ORAL at 08:09

## 2023-06-02 RX ADMIN — HYDRALAZINE HYDROCHLORIDE 25 MG: 25 TABLET, FILM COATED ORAL at 06:01

## 2023-06-02 RX ADMIN — SPIRONOLACTONE 25 MG: 25 TABLET ORAL at 08:11

## 2023-06-02 RX ADMIN — SODIUM CHLORIDE, PRESERVATIVE FREE 10 ML: 5 INJECTION INTRAVENOUS at 08:09

## 2023-06-02 RX ADMIN — PRAVASTATIN SODIUM 10 MG: 20 TABLET ORAL at 08:10

## 2023-06-02 RX ADMIN — AMLODIPINE BESYLATE 10 MG: 10 TABLET ORAL at 08:12

## 2023-06-02 RX ADMIN — ASPIRIN 81 MG 81 MG: 81 TABLET ORAL at 08:10

## 2023-06-02 RX ADMIN — METOPROLOL SUCCINATE 50 MG: 25 TABLET, EXTENDED RELEASE ORAL at 08:11

## 2023-06-02 RX ADMIN — ISOSORBIDE MONONITRATE 30 MG: 30 TABLET, EXTENDED RELEASE ORAL at 08:12

## 2023-06-02 RX ADMIN — EMPAGLIFLOZIN 10 MG: 10 TABLET, FILM COATED ORAL at 08:09

## 2023-06-02 RX ADMIN — FUROSEMIDE 40 MG: 40 TABLET ORAL at 08:11

## 2023-06-02 ASSESSMENT — PAIN SCALES - GENERAL
PAINLEVEL_OUTOF10: 0

## 2023-06-02 NOTE — PLAN OF CARE
Problem: Discharge Planning  Goal: Discharge to home or other facility with appropriate resources  6/2/2023 0859 by Paddy Cruz RN  Outcome: Completed  6/2/2023 0511 by Odis Klinefelter, RN  Outcome: Progressing  Flowsheets (Taken 6/1/2023 2042)  Discharge to home or other facility with appropriate resources: Identify barriers to discharge with patient and caregiver     Problem: Pain  Goal: Verbalizes/displays adequate comfort level or baseline comfort level  6/2/2023 0859 by Paddy Cruz RN  Outcome: Completed  6/2/2023 0511 by Odis Klinefelter, RN  Outcome: Progressing     Problem: ABCDS Injury Assessment  Goal: Absence of physical injury  6/2/2023 0859 by Paddy Cruz RN  Outcome: Completed  6/2/2023 0511 by Odis Klinefelter, RN  Outcome: Progressing  Flowsheets (Taken 6/1/2023 2042)  Absence of Physical Injury: Implement safety measures based on patient assessment     Problem: Safety - Adult  Goal: Free from fall injury  6/2/2023 0859 by Paddy Cruz RN  Outcome: Completed  6/2/2023 0511 by Odis Klinefelter, RN  Outcome: Progressing  [address] (Taken 6/1/2023 2042)  Free From Fall Injury: Instruct family/caregiver on patient safety     Problem: Chronic Conditions and Co-morbidities  Goal: Patient's chronic conditions and co-morbidity symptoms are monitored and maintained or improved  6/2/2023 0859 by Paddy Cruz RN  Outcome: Completed  6/2/2023 0511 by Odis Klinefelter, RN  Outcome: Progressing  [address] (Taken 6/1/2023 2042)  Care Plan - Patient's Chronic Conditions and Co-Morbidity Symptoms are Monitored and Maintained or Improved: Monitor and assess patient's chronic conditions and comorbid symptoms for stability, deterioration, or improvement

## 2023-06-02 NOTE — PROGRESS NOTES
Discharge instructions reviewed with patient. Prescriptions given for new medications and med info sheets provided for all new medications. Opportunity for questions provided. Patient voiced understanding of all discharge instructions. IV and tele box removed. VSS, R radial site CDI. Wife to transport patient home. No needs stated at this time.

## 2023-06-02 NOTE — PROGRESS NOTES
Cardiac Rehab: Referral received for diagnosis of CHF. Cardiac rehab participation is appropriate for those with stable, chronic heart failure defined as patients with left ventricular ejection fraction of 35% or less and New York Heart Association (NYHA) class II to IV symptoms despite being on optimal heart failure therapy for at least six weeks. Stable patients are defined as patients who have not had a recent ( 6 weeks or less) or planned (6 weeks or less) major cardiovascular hospitalizations or procedures. Patient's EF10-15%, NYHA class unknown, CHF is chronic. I reviewed the goals and purpose of the Cardiac Rehab for the CHF patient. Pt states he may be interested in participating when appropriate. I will follow up with him closer to the 6 week post discharge timeframe.     Thank you,  PAMELLA Bell, RN  Cardiac Rehab Nurse Liaison

## 2023-06-02 NOTE — PLAN OF CARE
Problem: Discharge Planning  Goal: Discharge to home or other facility with appropriate resources  Outcome: Progressing  Flowsheets (Taken 6/1/2023 2042)  Discharge to home or other facility with appropriate resources: Identify barriers to discharge with patient and caregiver     Problem: Pain  Goal: Verbalizes/displays adequate comfort level or baseline comfort level  Outcome: Progressing     Problem: ABCDS Injury Assessment  Goal: Absence of physical injury  Outcome: Progressing  Flowsheets (Taken 6/1/2023 2042)  Absence of Physical Injury: Implement safety measures based on patient assessment     Problem: Safety - Adult  Goal: Free from fall injury  Outcome: Progressing  Flowsheets (Taken 6/1/2023 2042)  Free From Fall Injury: Instruct family/caregiver on patient safety     Problem: Chronic Conditions and Co-morbidities  Goal: Patient's chronic conditions and co-morbidity symptoms are monitored and maintained or improved  Outcome: Progressing  Flowsheets (Taken 6/1/2023 2042)  Care Plan - Patient's Chronic Conditions and Co-Morbidity Symptoms are Monitored and Maintained or Improved: Monitor and assess patient's chronic conditions and comorbid symptoms for stability, deterioration, or improvement

## 2023-06-02 NOTE — DISCHARGE SUMMARY
[facility] Cardiology Discharge Summary     Patient ID:  Massimo Mello  [id number]  45 y.o.  1970    Admit date: 5/30/2023    Discharge date and time:  6/2/2023    Admitting Physician: Keerthi Larios MD     Discharge Physician: DANTE Stern/Dr. Robles    Admission Diagnoses: NSTEMI (non-ST elevated myocardial infarction) [facility]) [I21.4]  Acute on chronic systolic (congestive) heart failure (HCC) [I50.23]  Acute on chronic congestive heart failure, unspecified heart failure type ([facility] [I50.9]  Heart failure ([facility] [I50.9]    Discharge Diagnoses:   Patient Active Problem List    Diagnosis    Elevated troponin    Acute on chronic systolic (congestive) heart failure (HCC)    S/P ICD (internal cardiac defibrillator) procedure    NICM (nonischemic cardiomyopathy) ([facility]    Chronic HFrEF (heart failure with reduced ejection fraction) ([facility]    Atypical chest pain    Hypertension    Bilateral lower extremity edema    Hyperlipidemia     Cardiology Procedures this admission:  Diagnostic left heart catheterization  Consults: none    Hospital Course: [facility] with complaints of SOB, orthopnea, and PND. Pt had elevated BNP of 5074. Pt has known EF of 15-20%. Pt was started on IV diuresis with Lasix BID. Had episode of chest pain. Underwent LHC by Dr Ivonne Graham. Was found to have normal coronary arteries with severely reduced LV systolic function and moderately elevated LV filling pressures. Patient tolerated the procedure well and was taken to telemetry floor for recovery. Pt had good response to IV diuresis and was able to be changed to PO Lasix on 6-2. Pt had good urine out put and net neg ~3L . On the morning of discharge, the patient was up feeling well without any complaints of CP, SOB, palpitations or LE swelling. Pt's labs were stable. Patient's right radial cath site was clean, dry and intact without hematoma or bruit. Pt was seen and examined by Dr. Anahi Clifford and determined stable

## 2023-06-02 NOTE — PROGRESS NOTES
[facility] CARDIOLOGY PROGRESS NOTE           6/2/2023 7:13 AM    Admit Date: 5/30/2023      Subjective:   Patient notes that his dyspnea has resolved. No edema. Feels that he is ready to go home. Was on Lasix 40 mg once a day prior to his arrival.  Blood pressure much improved. ROS:  Cardiovascular:  As noted above    Objective:      Vitals:    06/02/23 0021 06/02/23 0453 06/02/23 0601 06/02/23 0704   BP: 108/72 (!) 116/92 112/84 113/85   Pulse: 85 68  79   Resp: 18 20  18   Temp: 98.7 °F (37.1 °C) 97.5 °F (36.4 °C)  97.9 °F (36.6 °C)   TempSrc: Temporal Temporal  Oral   SpO2: 97% 96%  92%   Weight:  146 lb 9.6 oz (66.5 kg)     Height:           Physical Exam:  General-No Acute Distress  Neck- supple, no JVD  CV- regular rate and rhythm n+ murmur  Lung- clear bilaterally  Abd- soft, nontender, nondistended  Ext- no edema bilaterally. Skin- warm and dry      Data Review:   Recent Labs     05/30/23  1107 05/10/23  0934 05/04/23  1012   WBC 6.3 8.0 5.5   HGB 14.8 13.5* 14.2   HCT 44.0 39.3* 41.5   MCV 92.6 92.0 91.8    212 231       Recent Labs     06/02/23  0453 05/31/23  0356 05/30/23  1107      < > 141   K 3.7   < > 4.0      < > 114*   CO2 25   < > 22   BUN 24*   < > 17   CREATININE 1.30   < > 1.40   GLUCOSE 96   < > 121*   CALCIUM 8.3   < > 8.6   PROT  --   --  6.0*   LABALBU  --   --  2.6*   BILITOT  --   --  1.3*   ALKPHOS  --   --  55   AST  --   --  28   ALT  --   --  26   LABGLOM >60   < > 60*   GLOB  --   --  3.4    < > = values in this interval not displayed. No results for input(s): CKTOTAL, CKMB, CKMBINDEX, DDIMER, TROPONINI in the last 720 hours. Assessment/Plan:     Active Hospital Problems    Elevated troponin  Cardiac catheterization with normal coronary arteries. Continue aspirin. *Acute on chronic systolic (congestive) heart failure (HCC)  Currently well compensated. Change intravenous Lasix to oral therapy.   Sent home with Lasix 40 mg

## 2023-06-05 ENCOUNTER — TELEPHONE (OUTPATIENT)
Age: 53
End: 2023-06-05

## 2023-06-05 NOTE — TELEPHONE ENCOUNTER
Salome Atwood has been contacted regarding recent hospital admission. Current medications were reviewed with the patient/caregiver by telephone. Date of Admission:  5/30/23     Date of Discharge: 6/2/23     Facility patient was discharged from: [facility]     Reason for Admission: CHF, elevated troponin   Any medication changes? yes     How is the patient feeling? Much better     Does the patient have any questions or problems? no   Does the patient need transportation? no     Follow-up Appointment date: 6/7/23       I advised the patient to bring his medications in the original bottles and to contact office, or go to either urgent care or emergency care if symptoms return before scheduled appointment.     Elham Álvarez RN 6/5/2023 9:44 AM

## 2023-06-07 ENCOUNTER — OFFICE VISIT (OUTPATIENT)
Age: 53
End: 2023-06-07

## 2023-06-07 VITALS
WEIGHT: 138 LBS | DIASTOLIC BLOOD PRESSURE: 82 MMHG | SYSTOLIC BLOOD PRESSURE: 138 MMHG | BODY MASS INDEX: 20.44 KG/M2 | HEIGHT: 69 IN | HEART RATE: 96 BPM

## 2023-06-07 DIAGNOSIS — I10 ESSENTIAL (PRIMARY) HYPERTENSION: ICD-10-CM

## 2023-06-07 DIAGNOSIS — I50.22 CHRONIC HFREF (HEART FAILURE WITH REDUCED EJECTION FRACTION) (HCC): Primary | ICD-10-CM

## 2023-06-07 LAB
ANION GAP SERPL CALC-SCNC: 4 MMOL/L (ref 2–11)
BUN SERPL-MCNC: 18 MG/DL (ref 6–23)
CALCIUM SERPL-MCNC: 8.8 MG/DL (ref 8.3–10.4)
CHLORIDE SERPL-SCNC: 112 MMOL/L (ref 101–110)
CO2 SERPL-SCNC: 26 MMOL/L (ref 21–32)
CREAT SERPL-MCNC: 1.3 MG/DL (ref 0.8–1.5)
GLUCOSE SERPL-MCNC: 102 MG/DL (ref 65–100)
MAGNESIUM SERPL-MCNC: 2.2 MG/DL (ref 1.8–2.4)
POTASSIUM SERPL-SCNC: 4.8 MMOL/L (ref 3.5–5.1)
SODIUM SERPL-SCNC: 142 MMOL/L (ref 133–143)

## 2023-06-07 RX ORDER — FUROSEMIDE 40 MG/1
TABLET ORAL
Qty: 180 TABLET | Refills: 3 | Status: SHIPPED | OUTPATIENT
Start: 2023-06-07

## 2023-06-07 ASSESSMENT — ENCOUNTER SYMPTOMS
COUGH: 0
NAIL CHANGES: 0
ABDOMINAL PAIN: 0
APHONIA: 0
EYE PAIN: 0
STRIDOR: 0

## 2023-06-07 NOTE — PROGRESS NOTES
orders  -     furosemide (LASIX) 40 MG tablet; Take 40 mg p.o. daily. Check weight daily, if weight increases more than 2 pounds in 24 hours or 4 pounds in 48 hours take twice daily. No follow-ups on file.    Dr Emeli Concepcion 4-6 weeks     Joce Briceno MD  6/7/2023  9:17 AM

## 2023-06-19 ENCOUNTER — TELEPHONE (OUTPATIENT)
Age: 53
End: 2023-06-19

## 2023-06-19 NOTE — TELEPHONE ENCOUNTER
Recent heart cath and ICD placement 5/9 . 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 He is on his feet 8 hours a day. Still has some soreness in chest.He also developed fluid afterwards and CHF symptoms. He needs a note that he can not go back until he has a fu w/. They are wanting him to go back to work today. He needs the doctor that implanted the device to give him a note for work.

## 2023-06-19 NOTE — TELEPHONE ENCOUNTER
SEE TELEPHONE ENCOUNTERS 6/19/2023. Patient hospitalized for ICD implant 5/9/2023. Under restriction for left arm use at least 6 weeks (ends 6/20/2023). While recovering from ICD implant hospitalized 5/30/2023 - 6/02/2023 for acute flare up of CHF (thought to be NSTEMI but LHC showed normal coronaries). EF on LHC less than 15%. Echo prior to ICD implant November 2022 showed EF 15-20%. At follow up [address] 6/07/2023 medical therapy adjusted. Has re-eval 7/10/2023.   Per Dr. Milton Pratt and Dr. Bryan Kelley remain out at least until 7/10/2023 re-eval.    Form completed based on above./smb No

## 2023-06-19 NOTE — TELEPHONE ENCOUNTER
SEE TELEPHONE ENCOUNTER 6/02/2023 and 6/19/2023. Forms received from [facility]. Out since ICD implant 5/9/2023. Restricted in use of left arm for at least 6 weeks post implant (ending 6/20/2023). While recovering from ICD implant had acute flare up of CHF - hospitalized 5/30/2023 - 6/02/2023. Initially thought to have NSTEMI but LHC showed normal coronary arteries (left ventriculogram showed EF less than 15%). Medical therapy adjusted in hospital and at [address] 6/07/2023 (TC7 visit with Dr. Myrna Gallo). Needs more time for ICD site to heal given heavy exertion/lifting at work and time to evaluate response to medical therapy regimen for CHF. Next OV 7/10/2023.   Both Dr. Naldo Kruse and Dr. Tiara Rivas agree he should remain out at least until OV 7/10/2023./rober

## 2023-06-19 NOTE — TELEPHONE ENCOUNTER
SEE TELEPHONE ENCOUNTER 6/02/2023. Per Dr. Denys Rivas and Dr. Hodge Noss remain out of work until re-eval 7/10/2023. Has physically strenuous job. Needs more time for implant site to heal as well as time to evaluate response to new CHF therapy (adjusted after acute flare up 5/30-6/2/2023).   Form completed based on recommendations./chiarab

## 2023-06-19 NOTE — TELEPHONE ENCOUNTER
Spoke with Pt he stated he needs a note from Dr Calleen Galeazzi or Dr Josi Mendoza is available to state whether or not he is able to return to work. Pt is still symptomatic and having some soreness. Pt is due to be seen by Dr Calleen Galeazzi in 4-6 weeks  per Dr Jax Loya and is scheduled on 7/10/23 with Dr Tosha Groves.  Will send to him to advise

## 2023-06-20 NOTE — TELEPHONE ENCOUNTER
Called Mr. Guillermina Heimlich, he notes his employer is asking if he is returning to work this week (the original FMLA submitted for the ICD implant recommended six weeks of recovery before returning to work - 6/20/2023 is exactly 6 weeks from surgery). Reviewed the recommendation of both Dr. Nehemias Valdivia and Dr. Randolph Palacios, Mr. Guillermina Heimlich is agreeable with this plan noting his site is still a little tender. Encouraged him to start moving his arms in their full range of motion and start practicing lifting heavier objects (initially no more than 25 lbs this week, 50  lbs next week, etc.). Encouraged his to start increasing his walking, try to walk up hills and stairs more and more to build up his endurance. Advised if he is not ready to return to work at his next [address] 7/10/2023 he can ask for Vocational Rehab referral.      Mr. Guillermina Heimlich is agreeable to the above. We will send a copy of the completed form to Mary Rodriguez and a copy of the form to his employer (emailed to [email or web address]).   Mr. Guillermina Heimlich will  a copy when comes in for his OV 7/10/2023./rober

## 2023-06-29 ENCOUNTER — HOSPITAL ENCOUNTER (EMERGENCY)
Age: 53
Discharge: HOME | End: 2023-06-29
Attending: EMERGENCY MEDICINE
Payer: COMMERCIAL

## 2023-06-29 ENCOUNTER — APPOINTMENT (OUTPATIENT)
Dept: GENERAL RADIOLOGY | Age: 53
End: 2023-06-29
Payer: COMMERCIAL

## 2023-06-29 ENCOUNTER — TELEPHONE (OUTPATIENT)
Age: 53
End: 2023-06-29

## 2023-06-29 VITALS
SYSTOLIC BLOOD PRESSURE: 117 MMHG | RESPIRATION RATE: 26 BRPM | TEMPERATURE: 97.7 F | HEART RATE: 90 BPM | HEIGHT: 68 IN | DIASTOLIC BLOOD PRESSURE: 97 MMHG | WEIGHT: 145 LBS | BODY MASS INDEX: 21.98 KG/M2 | OXYGEN SATURATION: 96 %

## 2023-06-29 DIAGNOSIS — I50.9 ACUTE ON CHRONIC CONGESTIVE HEART FAILURE, UNSPECIFIED HEART FAILURE TYPE (HCC): Primary | ICD-10-CM

## 2023-06-29 LAB
ALBUMIN SERPL-MCNC: 2.9 G/DL (ref 3.5–5)
ALBUMIN/GLOB SERPL: 0.8 (ref 0.4–1.6)
ALP SERPL-CCNC: 78 U/L (ref 50–136)
ALT SERPL-CCNC: 29 U/L (ref 12–65)
ANION GAP SERPL CALC-SCNC: 8 MMOL/L (ref 2–11)
AST SERPL-CCNC: 20 U/L (ref 15–37)
BASOPHILS # BLD: 0 K/UL (ref 0–0.2)
BASOPHILS NFR BLD: 1 % (ref 0–2)
BILIRUB SERPL-MCNC: 2.4 MG/DL (ref 0.2–1.1)
BUN SERPL-MCNC: 21 MG/DL (ref 6–23)
CALCIUM SERPL-MCNC: 8.8 MG/DL (ref 8.3–10.4)
CHLORIDE SERPL-SCNC: 110 MMOL/L (ref 101–110)
CO2 SERPL-SCNC: 23 MMOL/L (ref 21–32)
CREAT SERPL-MCNC: 1.8 MG/DL (ref 0.8–1.5)
DIFFERENTIAL METHOD BLD: NORMAL
EOSINOPHIL # BLD: 0.1 K/UL (ref 0–0.8)
EOSINOPHIL NFR BLD: 1 % (ref 0.5–7.8)
ERYTHROCYTE [DISTWIDTH] IN BLOOD BY AUTOMATED COUNT: 14.1 % (ref 11.9–14.6)
GLOBULIN SER CALC-MCNC: 3.6 G/DL (ref 2.8–4.5)
GLUCOSE SERPL-MCNC: 150 MG/DL (ref 65–100)
HCT VFR BLD AUTO: 42.3 % (ref 41.1–50.3)
HGB BLD-MCNC: 14.2 G/DL (ref 13.6–17.2)
IMM GRANULOCYTES # BLD AUTO: 0 K/UL (ref 0–0.5)
IMM GRANULOCYTES NFR BLD AUTO: 0 % (ref 0–5)
LYMPHOCYTES # BLD: 2.2 K/UL (ref 0.5–4.6)
LYMPHOCYTES NFR BLD: 25 % (ref 13–44)
MAGNESIUM SERPL-MCNC: 1.5 MG/DL (ref 1.8–2.4)
MCH RBC QN AUTO: 30.9 PG (ref 26.1–32.9)
MCHC RBC AUTO-ENTMCNC: 33.6 G/DL (ref 31.4–35)
MCV RBC AUTO: 92.2 FL (ref 82–102)
MONOCYTES # BLD: 0.5 K/UL (ref 0.1–1.3)
MONOCYTES NFR BLD: 6 % (ref 4–12)
NEUTS SEG # BLD: 5.9 K/UL (ref 1.7–8.2)
NEUTS SEG NFR BLD: 67 % (ref 43–78)
NRBC # BLD: 0.07 K/UL (ref 0–0.2)
NT PRO BNP: ABNORMAL PG/ML (ref 5–125)
PLATELET # BLD AUTO: 165 K/UL (ref 150–450)
PMV BLD AUTO: 11.1 FL (ref 9.4–12.3)
POTASSIUM SERPL-SCNC: 3.9 MMOL/L (ref 3.5–5.1)
PROT SERPL-MCNC: 6.5 G/DL (ref 6.3–8.2)
RBC # BLD AUTO: 4.59 M/UL (ref 4.23–5.6)
SODIUM SERPL-SCNC: 141 MMOL/L (ref 133–143)
WBC # BLD AUTO: 8.8 K/UL (ref 4.3–11.1)

## 2023-06-29 PROCEDURE — 83880 ASSAY OF NATRIURETIC PEPTIDE: CPT

## 2023-06-29 PROCEDURE — 6370000000 HC RX 637 (ALT 250 FOR IP): Performed by: EMERGENCY MEDICINE

## 2023-06-29 PROCEDURE — 99285 EMERGENCY DEPT VISIT HI MDM: CPT

## 2023-06-29 PROCEDURE — 93005 ELECTROCARDIOGRAM TRACING: CPT | Performed by: EMERGENCY MEDICINE

## 2023-06-29 PROCEDURE — 83735 ASSAY OF MAGNESIUM: CPT

## 2023-06-29 PROCEDURE — 96374 THER/PROPH/DIAG INJ IV PUSH: CPT

## 2023-06-29 PROCEDURE — 94640 AIRWAY INHALATION TREATMENT: CPT

## 2023-06-29 PROCEDURE — 6360000002 HC RX W HCPCS: Performed by: EMERGENCY MEDICINE

## 2023-06-29 PROCEDURE — 85025 COMPLETE CBC W/AUTO DIFF WBC: CPT

## 2023-06-29 PROCEDURE — 80053 COMPREHEN METABOLIC PANEL: CPT

## 2023-06-29 PROCEDURE — 71046 X-RAY EXAM CHEST 2 VIEWS: CPT

## 2023-06-29 RX ORDER — IPRATROPIUM BROMIDE AND ALBUTEROL SULFATE 2.5; .5 MG/3ML; MG/3ML
1 SOLUTION RESPIRATORY (INHALATION)
Status: COMPLETED | OUTPATIENT
Start: 2023-06-29 | End: 2023-06-29

## 2023-06-29 RX ORDER — FUROSEMIDE 10 MG/ML
60 INJECTION INTRAMUSCULAR; INTRAVENOUS
Status: COMPLETED | OUTPATIENT
Start: 2023-06-29 | End: 2023-06-29

## 2023-06-29 RX ADMIN — FUROSEMIDE 60 MG: 10 INJECTION, SOLUTION INTRAMUSCULAR; INTRAVENOUS at 08:02

## 2023-06-29 RX ADMIN — IPRATROPIUM BROMIDE AND ALBUTEROL SULFATE 1 DOSE: 2.5; .5 SOLUTION RESPIRATORY (INHALATION) at 08:04

## 2023-06-29 RX ADMIN — NITROGLYCERIN 1 INCH: 20 OINTMENT TOPICAL at 08:02

## 2023-06-29 ASSESSMENT — LIFESTYLE VARIABLES
HOW MANY STANDARD DRINKS CONTAINING ALCOHOL DO YOU HAVE ON A TYPICAL DAY: 1 OR 2
HOW OFTEN DO YOU HAVE A DRINK CONTAINING ALCOHOL: 2-3 TIMES A WEEK

## 2023-06-29 ASSESSMENT — ENCOUNTER SYMPTOMS
DIARRHEA: 0
FACIAL SWELLING: 0
EYE REDNESS: 0
BACK PAIN: 0
RHINORRHEA: 0
COUGH: 0
VOMITING: 0
SHORTNESS OF BREATH: 1
NAUSEA: 0
EYE DISCHARGE: 0
COLOR CHANGE: 0
ABDOMINAL PAIN: 0

## 2023-06-29 ASSESSMENT — PAIN SCALES - GENERAL: PAINLEVEL_OUTOF10: 0

## 2023-06-29 ASSESSMENT — PAIN - FUNCTIONAL ASSESSMENT: PAIN_FUNCTIONAL_ASSESSMENT: 0-10

## 2023-07-02 PROBLEM — R79.89 ELEVATED TROPONIN: Status: RESOLVED | Noted: 2023-06-02 | Resolved: 2023-07-02

## 2023-07-02 PROBLEM — R77.8 ELEVATED TROPONIN: Status: RESOLVED | Noted: 2023-06-02 | Resolved: 2023-07-02

## 2023-07-03 NOTE — PROGRESS NOTES
[facility] CARDIOLOGY Follow Up                 Reason for Visit: Chronic HFrEF    Subjective:     Patient is a 45 y.o. male with a PMH of chronic HFrEF, NICM, status post ICD, hypertension, and hyperlipidemia who presents for follow-up. The patient was last seen in 2023. He was referred to EP to consider an ICD. A chemistry profile was ordered. This chemistry profile was noted to be normal.  He underwent ICD implant in May 2023. The patient was hospitalized in 2023 for chest pain. He underwent an LHC that was noted to demonstrate normal coronary arteries. He had a TTE in 2023 that was noted to demonstrate an EF of 10 to 15%, moderately dilated LV, and RV dysfunction. The patient visited the ED for shortness of breath on . He reports that the ED provider doubled his Lasix for 3 days at 80 mg a day. He has since gone back down to 40 mg daily. He is currently at with short-term disability and works with [email or web address]. Past Medical History:   Diagnosis Date    COPD (chronic obstructive pulmonary disease) ([address])     Hypertension       Past Surgical History:   Procedure Laterality Date    CARDIAC PROCEDURE N/A 2023    Left heart cath / coronary angiography performed by Kenroy Ortiz MD at [facility] CATH LAB    EP DEVICE PROCEDURE N/A 2023    Insert ICD dual performed by Pauline Mancini MD at [facility] CATH LAB      No family history on file. Social History     Tobacco Use    Smoking status: Former     Types: Cigarettes     Quit date: 2023     Years since quittin.1     Passive exposure: Current    Smokeless tobacco: Never   Substance Use Topics    Alcohol use: Yes      Allergies   Allergen Reactions    Entresto [Sacubitril-Valsartan] Cough    Lisinopril Cough         ROS:  No obvious pertinent positives on review of systems except for what was outlined above.        Objective:       /72   Pulse 97   Ht 5' 8\" (1.727 m)   Wt 142 lb (64.4 kg)

## 2023-07-05 ENCOUNTER — TELEPHONE (OUTPATIENT)
Age: 53
End: 2023-07-05

## 2023-07-05 ENCOUNTER — OFFICE VISIT (OUTPATIENT)
Age: 53
End: 2023-07-05
Payer: COMMERCIAL

## 2023-07-05 VITALS
BODY MASS INDEX: 21.52 KG/M2 | HEIGHT: 68 IN | HEART RATE: 97 BPM | WEIGHT: 142 LBS | SYSTOLIC BLOOD PRESSURE: 110 MMHG | DIASTOLIC BLOOD PRESSURE: 72 MMHG

## 2023-07-05 DIAGNOSIS — I10 HYPERTENSION, UNSPECIFIED TYPE: ICD-10-CM

## 2023-07-05 DIAGNOSIS — Z95.810 ICD (IMPLANTABLE CARDIOVERTER-DEFIBRILLATOR) IN PLACE: ICD-10-CM

## 2023-07-05 DIAGNOSIS — E78.5 HYPERLIPIDEMIA, UNSPECIFIED HYPERLIPIDEMIA TYPE: ICD-10-CM

## 2023-07-05 DIAGNOSIS — I42.8 NICM (NONISCHEMIC CARDIOMYOPATHY) (HCC): ICD-10-CM

## 2023-07-05 DIAGNOSIS — I50.22 CHRONIC HFREF (HEART FAILURE WITH REDUCED EJECTION FRACTION) (HCC): Primary | ICD-10-CM

## 2023-07-05 PROCEDURE — 99214 OFFICE O/P EST MOD 30 MIN: CPT | Performed by: INTERNAL MEDICINE

## 2023-07-05 PROCEDURE — 3074F SYST BP LT 130 MM HG: CPT | Performed by: INTERNAL MEDICINE

## 2023-07-05 PROCEDURE — 3078F DIAST BP <80 MM HG: CPT | Performed by: INTERNAL MEDICINE

## 2023-07-05 NOTE — TELEPHONE ENCOUNTER
----- Message from Deion Lima MD sent at 7/5/2023  4:01 PM EDT -----  Please facilitate heart failure referral to Dr. Martine RUBIO.

## 2023-07-06 ENCOUNTER — TELEPHONE (OUTPATIENT)
Age: 53
End: 2023-07-06

## 2023-07-11 ENCOUNTER — APPOINTMENT (OUTPATIENT)
Dept: GENERAL RADIOLOGY | Age: 53
End: 2023-07-11
Payer: COMMERCIAL

## 2023-07-11 ENCOUNTER — HOSPITAL ENCOUNTER (EMERGENCY)
Age: 53
Discharge: HOME | End: 2023-07-11
Attending: EMERGENCY MEDICINE
Payer: COMMERCIAL

## 2023-07-11 ENCOUNTER — TELEPHONE (OUTPATIENT)
Age: 53
End: 2023-07-11

## 2023-07-11 VITALS
SYSTOLIC BLOOD PRESSURE: 142 MMHG | HEART RATE: 98 BPM | TEMPERATURE: 97 F | WEIGHT: 142 LBS | RESPIRATION RATE: 28 BRPM | OXYGEN SATURATION: 95 % | BODY MASS INDEX: 21.52 KG/M2 | DIASTOLIC BLOOD PRESSURE: 119 MMHG | HEIGHT: 68 IN

## 2023-07-11 DIAGNOSIS — I50.9 CONGESTIVE HEART FAILURE, UNSPECIFIED HF CHRONICITY, UNSPECIFIED HEART FAILURE TYPE (HCC): Primary | ICD-10-CM

## 2023-07-11 LAB
ALBUMIN SERPL-MCNC: 2.9 G/DL (ref 3.5–5)
ALBUMIN/GLOB SERPL: 0.9 (ref 0.4–1.6)
ALP SERPL-CCNC: 64 U/L (ref 50–136)
ALT SERPL-CCNC: 27 U/L (ref 12–65)
ANION GAP SERPL CALC-SCNC: 7 MMOL/L (ref 2–11)
AST SERPL-CCNC: 20 U/L (ref 15–37)
BASOPHILS # BLD: 0.1 K/UL (ref 0–0.2)
BASOPHILS NFR BLD: 1 % (ref 0–2)
BILIRUB SERPL-MCNC: 2.2 MG/DL (ref 0.2–1.1)
BUN SERPL-MCNC: 22 MG/DL (ref 6–23)
CALCIUM SERPL-MCNC: 8.7 MG/DL (ref 8.3–10.4)
CHLORIDE SERPL-SCNC: 107 MMOL/L (ref 101–110)
CO2 SERPL-SCNC: 25 MMOL/L (ref 21–32)
CREAT SERPL-MCNC: 1.7 MG/DL (ref 0.8–1.5)
DIFFERENTIAL METHOD BLD: NORMAL
EKG ATRIAL RATE: 95 BPM
EKG DIAGNOSIS: NORMAL
EKG P AXIS: 59 DEGREES
EKG P-R INTERVAL: 172 MS
EKG Q-T INTERVAL: 385 MS
EKG QRS DURATION: 112 MS
EKG QTC CALCULATION (BAZETT): 482 MS
EKG R AXIS: -73 DEGREES
EKG T AXIS: 104 DEGREES
EKG VENTRICULAR RATE: 94 BPM
EOSINOPHIL # BLD: 0.2 K/UL (ref 0–0.8)
EOSINOPHIL NFR BLD: 2 % (ref 0.5–7.8)
ERYTHROCYTE [DISTWIDTH] IN BLOOD BY AUTOMATED COUNT: 14.6 % (ref 11.9–14.6)
GLOBULIN SER CALC-MCNC: 3.4 G/DL (ref 2.8–4.5)
GLUCOSE SERPL-MCNC: 91 MG/DL (ref 65–100)
HCT VFR BLD AUTO: 47.3 % (ref 41.1–50.3)
HGB BLD-MCNC: 15.8 G/DL (ref 13.6–17.2)
IMM GRANULOCYTES # BLD AUTO: 0 K/UL (ref 0–0.5)
IMM GRANULOCYTES NFR BLD AUTO: 0 % (ref 0–5)
LYMPHOCYTES # BLD: 2.4 K/UL (ref 0.5–4.6)
LYMPHOCYTES NFR BLD: 32 % (ref 13–44)
MCH RBC QN AUTO: 31.1 PG (ref 26.1–32.9)
MCHC RBC AUTO-ENTMCNC: 33.4 G/DL (ref 31.4–35)
MCV RBC AUTO: 93.1 FL (ref 82–102)
MONOCYTES # BLD: 0.6 K/UL (ref 0.1–1.3)
MONOCYTES NFR BLD: 7 % (ref 4–12)
NEUTS SEG # BLD: 4.3 K/UL (ref 1.7–8.2)
NEUTS SEG NFR BLD: 58 % (ref 43–78)
NRBC # BLD: 0.03 K/UL (ref 0–0.2)
NT PRO BNP: 3792 PG/ML (ref 5–125)
PLATELET # BLD AUTO: 265 K/UL (ref 150–450)
PMV BLD AUTO: 11.3 FL (ref 9.4–12.3)
POTASSIUM SERPL-SCNC: 3.3 MMOL/L (ref 3.5–5.1)
PROT SERPL-MCNC: 6.3 G/DL (ref 6.3–8.2)
RBC # BLD AUTO: 5.08 M/UL (ref 4.23–5.6)
SODIUM SERPL-SCNC: 139 MMOL/L (ref 133–143)
TROPONIN I SERPL HS-MCNC: 185.7 PG/ML (ref 0–14)
TROPONIN I SERPL HS-MCNC: 215.8 PG/ML (ref 0–14)
WBC # BLD AUTO: 7.6 K/UL (ref 4.3–11.1)

## 2023-07-11 PROCEDURE — 85025 COMPLETE CBC W/AUTO DIFF WBC: CPT

## 2023-07-11 PROCEDURE — 80053 COMPREHEN METABOLIC PANEL: CPT

## 2023-07-11 PROCEDURE — 71045 X-RAY EXAM CHEST 1 VIEW: CPT

## 2023-07-11 PROCEDURE — 93005 ELECTROCARDIOGRAM TRACING: CPT | Performed by: PHYSICIAN ASSISTANT

## 2023-07-11 PROCEDURE — 84484 ASSAY OF TROPONIN QUANT: CPT

## 2023-07-11 PROCEDURE — 83880 ASSAY OF NATRIURETIC PEPTIDE: CPT

## 2023-07-11 PROCEDURE — 99285 EMERGENCY DEPT VISIT HI MDM: CPT

## 2023-07-11 PROCEDURE — 93010 ELECTROCARDIOGRAM REPORT: CPT | Performed by: INTERNAL MEDICINE

## 2023-07-11 ASSESSMENT — PAIN SCALES - GENERAL: PAINLEVEL_OUTOF10: 0

## 2023-07-11 ASSESSMENT — LIFESTYLE VARIABLES
HOW MANY STANDARD DRINKS CONTAINING ALCOHOL DO YOU HAVE ON A TYPICAL DAY: 1 OR 2
HOW OFTEN DO YOU HAVE A DRINK CONTAINING ALCOHOL: MONTHLY OR LESS

## 2023-07-11 ASSESSMENT — PAIN - FUNCTIONAL ASSESSMENT: PAIN_FUNCTIONAL_ASSESSMENT: 0-10

## 2023-07-11 NOTE — TELEPHONE ENCOUNTER
Attempted to call Dr. Sean Hernandez office with [facility]- [address] [place] [address], but no answer after holding for 10 minutes. Per Brigette Corbin in Scheduling Dept, ASAP referral was sent to Dr. Darline Barney on 7/6/23 by Merlin Cinnamon at [facility]. Routed this triage note to scheduling pool.

## 2023-07-11 NOTE — ED PROVIDER NOTES
Emergency Department Provider Note       PCP: Michael Montesinos MD   Age: 45 y.o. Sex: male     DISPOSITION Decision To Discharge 07/11/2023 06:57:44 PM       ICD-10-CM    1. Congestive heart failure, unspecified HF chronicity, unspecified heart failure type ([address])  I50.9           Medical Decision Making     Complexity of Problems Addressed:  1 or more acute illnesses that pose a threat to life or bodily function. Data Reviewed and Analyzed:  Category 1:   I independently ordered and reviewed each unique test.  I reviewed external records: ED visit note from an outside group. I reviewed external records: provider visit note from PCP. I reviewed external records: provider visit note from outside specialist.       Category 2:   I independently ordered and interpreted the ED EKG in the absence of a Cardiologist.    Rate: 94  EKG Interpretation: EKG Interpretation: sinus rhythm, no evidence of arrhythmia and no acute changes  ST Segments: Normal ST segments - NO STEMI  I interpreted the X-rays chest x-ray no acute process. I interpreted the CBC CMP unremarkable BNP 3000 down from 12,000, troponin initial 215 repeat 185. Category 3: Discussion of management or test interpretation. 58-year-old male with history of CHF and cardiomyopathy with off-and-on shortness of breath. Feel troponins are increased due to cardiomyopathy and patient is having no chest pain no acute changes noted on his EKG. Patient discussed with Dr. Jovan Street patient will increase his Lasix to 80 mg/day for the next 3 days then go back to his normal 40 mg. He is to follow-up with his cardiologist in the next 2 to 3 days return to ER for any worsening symptoms. Risk of Complications and/or Morbidity of Patient Management:  Shared medical decision making was utilized in creating the patients health plan today. Is this patient to be included in the SEP-1 core measure due to severe sepsis or septic shock?  No Exclusion criteria -

## 2023-07-11 NOTE — ED TRIAGE NOTES
Pt presents for dyspnea since last night. Reports takes lasix but in past has come in for dyspnea and gotten IV lasix.    Hx COPD   A&Ox4

## 2023-07-11 NOTE — ED NOTES
Called patient for labs but he was in imaging will attempt to call later.       Jim Cotto LPN  65/09/37 [phone]

## 2023-07-11 NOTE — TELEPHONE ENCOUNTER
Increased SOB at rest, worsening with any exertion, since 7/5/23 office visit with Dr. Hector Santo. States he often cannot catch his breath. Worst SOB, ever. Much difficulty sleeping, unable to rest flat in bed, sometimes sitting up, very quickly, to catch breath. No edema, chest pain, or weight gain. Increased palpitations with feeling that heart is racing x 2 days. Very tired, feeling very bad. Stayed in bed all day yesterday and today. Today's [id number]. Current HR-. Today's BP-151/115, 128/99, 140/112. Taking lasix 40 mg qd, hydralazine 25 mg TID, Corlanor 5 mg BID, Toprol XL 50 mg qd, Imdur 30 mg qd, amlodipine 10 mg qd, spironolactone 25 mg qd, and Jardiance 10 mg qd. Has not yet received call to schedule appointment with Dr. Hussain Regalado. Advised wife, Laverne Jackson, to take patient to [facility] ER, now, for immediate evaluation. Gavino Bustillos that patient will be evaluated by ER doctor, who will call in cardiologist and other specialists, as needed. Apollo verbalized understanding.

## 2023-07-11 NOTE — DISCHARGE INSTRUCTIONS
Increase your Lasix to 80 mg a day for the next 3 days then go down to 40. Call your cardiology office tomorrow to arrange follow-up appointment.   Return to ER symptoms worsen

## 2023-07-11 NOTE — TELEPHONE ENCOUNTER
I called and informed pt.wife that pt.need to initiate longer disability w/his work place then drop of fthe forms for processing.

## 2023-07-12 NOTE — TELEPHONE ENCOUNTER
Rosa Miller, RN  Caller: Unspecified Kiya Gutierrez, 10:37 AM)  Just an update on this referral to [address]     Today, I spoke with Fernando Kearney at [address] regarding referral to Dr Ceferino Salcedo. She stated that they did receive it and it goes to review and they will call the patient to schedule an appointment. I also called the patient notified him of the above. I gave the patient the phone number so he can call to check status of referral as well. He understood and thanked me for checking into this matter.      Thanks,   [email or web address]

## 2023-07-25 ENCOUNTER — TELEPHONE (OUTPATIENT)
Age: 53
End: 2023-07-25

## 2023-07-25 NOTE — TELEPHONE ENCOUNTER
I contacted the pt's wife. I told her we haven't received any Disability forms since June. The last forms that were completed & faxed were back on 6/19/23. We stated the pt will be re-evaluated at his next appt on 7/10/23. Pt had to cancel his appt on 7/10/23 due to going to the ER. The appt was rescheduled for tomorrow 7/26/23. I told the pt's wife we haven't received any new forms to be completed. She said she will contact [address] [place] [address] & have them fax the paperwork.

## 2023-07-25 NOTE — TELEPHONE ENCOUNTER
Pt and his wife came to check out asking about disability forms that he needs for his job. States they need it done today.

## 2023-07-26 ENCOUNTER — TELEPHONE (OUTPATIENT)
Age: 53
End: 2023-07-26

## 2023-07-26 ENCOUNTER — OFFICE VISIT (OUTPATIENT)
Age: 53
End: 2023-07-26
Payer: COMMERCIAL

## 2023-07-26 VITALS
BODY MASS INDEX: 21.55 KG/M2 | WEIGHT: 142.2 LBS | HEART RATE: 96 BPM | HEIGHT: 68 IN | DIASTOLIC BLOOD PRESSURE: 100 MMHG | SYSTOLIC BLOOD PRESSURE: 128 MMHG

## 2023-07-26 DIAGNOSIS — I10 HYPERTENSION, UNSPECIFIED TYPE: ICD-10-CM

## 2023-07-26 DIAGNOSIS — E78.5 HYPERLIPIDEMIA, UNSPECIFIED HYPERLIPIDEMIA TYPE: ICD-10-CM

## 2023-07-26 DIAGNOSIS — Z95.810 ICD (IMPLANTABLE CARDIOVERTER-DEFIBRILLATOR) IN PLACE: ICD-10-CM

## 2023-07-26 DIAGNOSIS — I50.22 CHRONIC HFREF (HEART FAILURE WITH REDUCED EJECTION FRACTION) (HCC): Primary | ICD-10-CM

## 2023-07-26 PROCEDURE — 3080F DIAST BP >= 90 MM HG: CPT | Performed by: INTERNAL MEDICINE

## 2023-07-26 PROCEDURE — 99214 OFFICE O/P EST MOD 30 MIN: CPT | Performed by: INTERNAL MEDICINE

## 2023-07-26 PROCEDURE — 3074F SYST BP LT 130 MM HG: CPT | Performed by: INTERNAL MEDICINE

## 2023-07-26 RX ORDER — METOPROLOL SUCCINATE 100 MG/1
100 TABLET, EXTENDED RELEASE ORAL 2 TIMES DAILY
Qty: 90 TABLET | Refills: 3 | Status: SHIPPED | OUTPATIENT
Start: 2023-07-26

## 2023-07-26 RX ORDER — METOPROLOL SUCCINATE 50 MG/1
100 TABLET, EXTENDED RELEASE ORAL 2 TIMES DAILY
Qty: 90 TABLET | Refills: 3 | Status: SHIPPED | OUTPATIENT
Start: 2023-07-26 | End: 2023-07-26 | Stop reason: SDUPTHER

## 2023-07-26 NOTE — TELEPHONE ENCOUNTER
----- Message from Josh Milan MD sent at 7/26/2023  4:13 PM EDT -----  Please facilitate him seeing Dr. Ankit RUBIO. The referral has been placed.

## 2023-07-26 NOTE — TELEPHONE ENCOUNTER
Carl Guido in Scheduling Dept of Dr. Tan Nolasco request for patient  to see Dr. Lusi Corley with [address] [facility]. Re Loving states referral to Dr. Luis Corley has been processed and sent to [address]. [address] will be calling patient to schedule appointment.

## 2023-07-27 NOTE — TELEPHONE ENCOUNTER
I contacted [place] spoke to [facility]. She faxed the forms that needed to be completed. Forms completed  faxed to [facility] @ [phone]. Fax confirmation received. I also contacted his Employer, [employer]. I spoke to Andreaanil Romero ([phone]). She states she got an update from [address] [place] [address] Ave  today. Currently, they have extended Leave until 8/15/23- Pt Pending appt with Dr. Pema Beauchamp at [address]. Pt's wife was notified. She said she was also contacted by [facility] regarding the extension of Leave.

## 2023-08-03 ENCOUNTER — APPOINTMENT (OUTPATIENT)
Dept: GENERAL RADIOLOGY | Age: 53
End: 2023-08-03
Payer: COMMERCIAL

## 2023-08-03 ENCOUNTER — APPOINTMENT (OUTPATIENT)
Dept: CT IMAGING | Age: 53
End: 2023-08-03
Payer: COMMERCIAL

## 2023-08-03 ENCOUNTER — HOSPITAL ENCOUNTER (INPATIENT)
Age: 53
LOS: 2 days | Discharge: HOME | End: 2023-08-05
Attending: STUDENT IN AN ORGANIZED HEALTH CARE EDUCATION/TRAINING PROGRAM | Admitting: INTERNAL MEDICINE
Payer: COMMERCIAL

## 2023-08-03 DIAGNOSIS — I50.9 ACUTE ON CHRONIC CONGESTIVE HEART FAILURE, UNSPECIFIED HEART FAILURE TYPE (HCC): Primary | ICD-10-CM

## 2023-08-03 DIAGNOSIS — R06.03 RESPIRATORY DISTRESS: ICD-10-CM

## 2023-08-03 PROBLEM — R60.0 BILATERAL LOWER EXTREMITY EDEMA: Status: ACTIVE | Noted: 2022-01-11

## 2023-08-03 PROBLEM — E78.5 HYPERLIPIDEMIA: Status: ACTIVE | Noted: 2022-01-11

## 2023-08-03 LAB
ALBUMIN SERPL-MCNC: 3 G/DL (ref 3.5–5)
ALBUMIN/GLOB SERPL: 0.7 (ref 0.4–1.6)
ALP SERPL-CCNC: 76 U/L (ref 50–136)
ALT SERPL-CCNC: 42 U/L (ref 12–65)
ANION GAP SERPL CALC-SCNC: 7 MMOL/L (ref 2–11)
AST SERPL-CCNC: 39 U/L (ref 15–37)
BASOPHILS # BLD: 0.1 K/UL (ref 0–0.2)
BASOPHILS NFR BLD: 1 % (ref 0–2)
BILIRUB SERPL-MCNC: 2.3 MG/DL (ref 0.2–1.1)
BUN SERPL-MCNC: 32 MG/DL (ref 6–23)
CALCIUM SERPL-MCNC: 9 MG/DL (ref 8.3–10.4)
CHLORIDE SERPL-SCNC: 106 MMOL/L (ref 101–110)
CO2 SERPL-SCNC: 24 MMOL/L (ref 21–32)
CREAT SERPL-MCNC: 2.2 MG/DL (ref 0.8–1.5)
DIFFERENTIAL METHOD BLD: ABNORMAL
EKG ATRIAL RATE: 99 BPM
EKG DIAGNOSIS: NORMAL
EKG P AXIS: 52 DEGREES
EKG P-R INTERVAL: 160 MS
EKG Q-T INTERVAL: 376 MS
EKG QRS DURATION: 106 MS
EKG QTC CALCULATION (BAZETT): 482 MS
EKG R AXIS: -89 DEGREES
EKG T AXIS: 82 DEGREES
EKG VENTRICULAR RATE: 99 BPM
EOSINOPHIL # BLD: 0.1 K/UL (ref 0–0.8)
EOSINOPHIL NFR BLD: 1 % (ref 0.5–7.8)
ERYTHROCYTE [DISTWIDTH] IN BLOOD BY AUTOMATED COUNT: 14.6 % (ref 11.9–14.6)
GLOBULIN SER CALC-MCNC: 4.1 G/DL (ref 2.8–4.5)
GLUCOSE SERPL-MCNC: 155 MG/DL (ref 65–100)
HCT VFR BLD AUTO: 52 % (ref 41.1–50.3)
HGB BLD-MCNC: 17 G/DL (ref 13.6–17.2)
IMM GRANULOCYTES # BLD AUTO: 0 K/UL (ref 0–0.5)
IMM GRANULOCYTES NFR BLD AUTO: 0 % (ref 0–5)
LYMPHOCYTES # BLD: 2.4 K/UL (ref 0.5–4.6)
LYMPHOCYTES NFR BLD: 33 % (ref 13–44)
MAGNESIUM SERPL-MCNC: 2.1 MG/DL (ref 1.8–2.4)
MCH RBC QN AUTO: 30.4 PG (ref 26.1–32.9)
MCHC RBC AUTO-ENTMCNC: 32.7 G/DL (ref 31.4–35)
MCV RBC AUTO: 93 FL (ref 82–102)
MONOCYTES # BLD: 0.4 K/UL (ref 0.1–1.3)
MONOCYTES NFR BLD: 6 % (ref 4–12)
NEUTS SEG # BLD: 4.3 K/UL (ref 1.7–8.2)
NEUTS SEG NFR BLD: 59 % (ref 43–78)
NRBC # BLD: 0.02 K/UL (ref 0–0.2)
NT PRO BNP: 7076 PG/ML (ref 5–125)
PLATELET # BLD AUTO: 204 K/UL (ref 150–450)
PMV BLD AUTO: 11.5 FL (ref 9.4–12.3)
POTASSIUM SERPL-SCNC: 5 MMOL/L (ref 3.5–5.1)
PROT SERPL-MCNC: 7.1 G/DL (ref 6.3–8.2)
RBC # BLD AUTO: 5.59 M/UL (ref 4.23–5.6)
SODIUM SERPL-SCNC: 137 MMOL/L (ref 133–143)
TROPONIN I SERPL HS-MCNC: 147.9 PG/ML (ref 0–14)
TROPONIN I SERPL HS-MCNC: 155.8 PG/ML (ref 0–14)
WBC # BLD AUTO: 7.3 K/UL (ref 4.3–11.1)

## 2023-08-03 PROCEDURE — 83735 ASSAY OF MAGNESIUM: CPT

## 2023-08-03 PROCEDURE — 1100000003 HC PRIVATE W/ TELEMETRY

## 2023-08-03 PROCEDURE — 2580000003 HC RX 258: Performed by: INTERNAL MEDICINE

## 2023-08-03 PROCEDURE — 6360000004 HC RX CONTRAST MEDICATION: Performed by: STUDENT IN AN ORGANIZED HEALTH CARE EDUCATION/TRAINING PROGRAM

## 2023-08-03 PROCEDURE — 84484 ASSAY OF TROPONIN QUANT: CPT

## 2023-08-03 PROCEDURE — 93010 ELECTROCARDIOGRAM REPORT: CPT | Performed by: INTERNAL MEDICINE

## 2023-08-03 PROCEDURE — 83880 ASSAY OF NATRIURETIC PEPTIDE: CPT

## 2023-08-03 PROCEDURE — 93005 ELECTROCARDIOGRAM TRACING: CPT | Performed by: STUDENT IN AN ORGANIZED HEALTH CARE EDUCATION/TRAINING PROGRAM

## 2023-08-03 PROCEDURE — 71045 X-RAY EXAM CHEST 1 VIEW: CPT

## 2023-08-03 PROCEDURE — 6370000000 HC RX 637 (ALT 250 FOR IP): Performed by: NURSE PRACTITIONER

## 2023-08-03 PROCEDURE — 71260 CT THORAX DX C+: CPT

## 2023-08-03 PROCEDURE — 80053 COMPREHEN METABOLIC PANEL: CPT

## 2023-08-03 PROCEDURE — 6360000002 HC RX W HCPCS: Performed by: NURSE PRACTITIONER

## 2023-08-03 PROCEDURE — 6370000000 HC RX 637 (ALT 250 FOR IP): Performed by: INTERNAL MEDICINE

## 2023-08-03 PROCEDURE — 6360000002 HC RX W HCPCS: Performed by: STUDENT IN AN ORGANIZED HEALTH CARE EDUCATION/TRAINING PROGRAM

## 2023-08-03 PROCEDURE — 85025 COMPLETE CBC W/AUTO DIFF WBC: CPT

## 2023-08-03 PROCEDURE — 99285 EMERGENCY DEPT VISIT HI MDM: CPT

## 2023-08-03 RX ORDER — SODIUM CHLORIDE 0.9 % (FLUSH) 0.9 %
5-40 SYRINGE (ML) INJECTION PRN
Status: DISCONTINUED | OUTPATIENT
Start: 2023-08-03 | End: 2023-08-05 | Stop reason: HOSPADM

## 2023-08-03 RX ORDER — SODIUM CHLORIDE 0.9 % (FLUSH) 0.9 %
5-40 SYRINGE (ML) INJECTION EVERY 12 HOURS SCHEDULED
Status: DISCONTINUED | OUTPATIENT
Start: 2023-08-03 | End: 2023-08-05 | Stop reason: HOSPADM

## 2023-08-03 RX ORDER — AMLODIPINE BESYLATE 10 MG/1
10 TABLET ORAL DAILY
Status: DISCONTINUED | OUTPATIENT
Start: 2023-08-03 | End: 2023-08-04

## 2023-08-03 RX ORDER — ACETAMINOPHEN 650 MG/1
650 SUPPOSITORY RECTAL EVERY 6 HOURS PRN
Status: DISCONTINUED | OUTPATIENT
Start: 2023-08-03 | End: 2023-08-05 | Stop reason: HOSPADM

## 2023-08-03 RX ORDER — SODIUM CHLORIDE 9 MG/ML
INJECTION, SOLUTION INTRAVENOUS PRN
Status: DISCONTINUED | OUTPATIENT
Start: 2023-08-03 | End: 2023-08-05 | Stop reason: HOSPADM

## 2023-08-03 RX ORDER — FUROSEMIDE 10 MG/ML
40 INJECTION INTRAMUSCULAR; INTRAVENOUS ONCE
Status: COMPLETED | OUTPATIENT
Start: 2023-08-03 | End: 2023-08-03

## 2023-08-03 RX ORDER — POLYETHYLENE GLYCOL 3350 17 G/17G
17 POWDER, FOR SOLUTION ORAL DAILY PRN
Status: DISCONTINUED | OUTPATIENT
Start: 2023-08-03 | End: 2023-08-05 | Stop reason: HOSPADM

## 2023-08-03 RX ORDER — HYDRALAZINE HYDROCHLORIDE 25 MG/1
25 TABLET, FILM COATED ORAL EVERY 8 HOURS SCHEDULED
Status: DISCONTINUED | OUTPATIENT
Start: 2023-08-03 | End: 2023-08-04

## 2023-08-03 RX ORDER — ENOXAPARIN SODIUM 100 MG/ML
40 INJECTION SUBCUTANEOUS EVERY 24 HOURS
Status: DISCONTINUED | OUTPATIENT
Start: 2023-08-03 | End: 2023-08-04

## 2023-08-03 RX ORDER — ISOSORBIDE MONONITRATE 30 MG/1
30 TABLET, EXTENDED RELEASE ORAL DAILY
Status: DISCONTINUED | OUTPATIENT
Start: 2023-08-03 | End: 2023-08-04

## 2023-08-03 RX ORDER — METOPROLOL SUCCINATE 100 MG/1
100 TABLET, EXTENDED RELEASE ORAL 2 TIMES DAILY
Status: DISCONTINUED | OUTPATIENT
Start: 2023-08-03 | End: 2023-08-03

## 2023-08-03 RX ORDER — ONDANSETRON 4 MG/1
4 TABLET, ORALLY DISINTEGRATING ORAL EVERY 8 HOURS PRN
Status: DISCONTINUED | OUTPATIENT
Start: 2023-08-03 | End: 2023-08-05 | Stop reason: HOSPADM

## 2023-08-03 RX ORDER — SPIRONOLACTONE 25 MG/1
25 TABLET ORAL DAILY
Status: DISCONTINUED | OUTPATIENT
Start: 2023-08-03 | End: 2023-08-04

## 2023-08-03 RX ORDER — PRAVASTATIN SODIUM 20 MG
10 TABLET ORAL NIGHTLY
Status: DISCONTINUED | OUTPATIENT
Start: 2023-08-03 | End: 2023-08-05 | Stop reason: HOSPADM

## 2023-08-03 RX ORDER — ACETAMINOPHEN 325 MG/1
650 TABLET ORAL EVERY 6 HOURS PRN
Status: DISCONTINUED | OUTPATIENT
Start: 2023-08-03 | End: 2023-08-05 | Stop reason: HOSPADM

## 2023-08-03 RX ORDER — METOPROLOL SUCCINATE 50 MG/1
50 TABLET, EXTENDED RELEASE ORAL 2 TIMES DAILY
Status: DISCONTINUED | OUTPATIENT
Start: 2023-08-03 | End: 2023-08-04

## 2023-08-03 RX ORDER — ONDANSETRON 2 MG/ML
4 INJECTION INTRAMUSCULAR; INTRAVENOUS EVERY 6 HOURS PRN
Status: DISCONTINUED | OUTPATIENT
Start: 2023-08-03 | End: 2023-08-05 | Stop reason: HOSPADM

## 2023-08-03 RX ADMIN — SODIUM CHLORIDE, PRESERVATIVE FREE 10 ML: 5 INJECTION INTRAVENOUS at 21:38

## 2023-08-03 RX ADMIN — METOPROLOL SUCCINATE 50 MG: 50 TABLET, FILM COATED, EXTENDED RELEASE ORAL at 21:37

## 2023-08-03 RX ADMIN — IOPAMIDOL 75 ML: 755 INJECTION, SOLUTION INTRAVENOUS at 11:47

## 2023-08-03 RX ADMIN — HYDRALAZINE HYDROCHLORIDE 25 MG: 25 TABLET, FILM COATED ORAL at 21:37

## 2023-08-03 RX ADMIN — FUROSEMIDE 40 MG: 10 INJECTION, SOLUTION INTRAMUSCULAR; INTRAVENOUS at 19:39

## 2023-08-03 RX ADMIN — FUROSEMIDE 40 MG: 10 INJECTION, SOLUTION INTRAMUSCULAR; INTRAVENOUS at 13:31

## 2023-08-03 RX ADMIN — PRAVASTATIN SODIUM 10 MG: 20 TABLET ORAL at 21:38

## 2023-08-03 ASSESSMENT — ENCOUNTER SYMPTOMS
EYES NEGATIVE: 1
SLEEP DISTURBANCES DUE TO BREATHING: 1
SNORING: 0
ABDOMINAL PAIN: 1
WHEEZING: 0
COUGH: 1
SPUTUM PRODUCTION: 0
ORTHOPNEA: 1
SHORTNESS OF BREATH: 1
BLOATING: 1

## 2023-08-03 ASSESSMENT — PAIN - FUNCTIONAL ASSESSMENT: PAIN_FUNCTIONAL_ASSESSMENT: NONE - DENIES PAIN

## 2023-08-03 NOTE — CONSULTS
and hypoxia, hx of chf TECHNIQUE: Multiple contiguous axial CT images of the chest were obtained from the lung apices to the lung bases after the intravenous administration of 100mL Iso-beba 370 per pulmonary angiography protocol. Coronal reconstructions were performed. Radiation dose reduction techniques were used for this study. Our CT scanners use one or all of the following: Automated exposure control, adjustment of the mA and/or kV according to patient size, iterative reconstruction. FINDINGS: STUDY QUALITY: The exam study quality is good. AIRWAYS: The central airways are patent. LUNGS: Upper lobe predominant centrilobular groundglass opacities. No airspace consolidation. No suspicious pulmonary nodules. PLEURA: No pleural effusion or pneumothorax. HEART: Four-chamber cardiomegaly. No calcified coronary atherosclerosis. No pericardial effusion. THORACIC AORTA: The aorta is normal in caliber. PULMONARY ARTERY: No pulmonary embolus to the segmental level. The main pulmonary artery is normal in caliber. MEDIASTINUM/CONNER: No mediastinal mass or lymphadenopathy. CHEST WALL: Before meals pack within the left anterior chest wall. UPPER ABDOMEN: Reflux of contrast into the hepatic vasculature, suggestive of elevated right heart pressures. BONES: No suspicious osseous lesion. 1.  No pulmonary embolus. 2.  Cardiomegaly without airspace consolidation. Reflux of contrast into the hepatic vasculature, suggestive of elevated right heart pressures. 3.  Upper lobe predominant centrilobular groundglass opacities, favored to represent smoking-related respiratory bronchiolitis. Initial Recommendations:        Principal Problem:    Acute on chronic congestive heart failure (HCC)  - reduce Toprol XL to home dosing that patient has been taking & that he has been able to tolerate  - continue hydralazine & Imdur  - will start trial IV dose lasix & monitor output.  If non-responsive, may need to consider inotrope therapy, if patient becomes hypotensive  - cardiac diet/CHF diet  Active Problems:    NICM (nonischemic cardiomyopathy) ([address]) & ICD (implantable cardioverter-defibrillator) in place  - ICD in place. On beta blocker therapy. No ACE/ARB/ARNi due to EDILMA. Continue to diuresis at this time and monitor BMP daily. Replace electrolytes prn. Hypertension  - patient normotensive now with active diuresis could consider holding amlodipine, if becomes hypotensive. Will continue for now. Amlodipine may not be the best choice in CHF. Hyperlipidemia  - continue pravastatin          Thank you very much for requesting a Cardiology Evaluation. We appreciate the opportunity to participate in this patient's care. We will follow along with above stated plan. This is initial management plan but please see attendings consult note for full plan of care.     Maria A Arias, APRN - CNP AGACNP-BC

## 2023-08-03 NOTE — ED TRIAGE NOTES
Per patient increasing sob since last night. States of non-productive cough. Patient states of nausea denies v/d. Denies gu complications. Denies fever/chills. RR 32, 02 saturation 80 room air.

## 2023-08-03 NOTE — ED PROVIDER NOTES
Emergency Department Provider Note       PCP: Abigail Flores MD   Age: 45 y.o. Sex: male     DISPOSITION Decision To Admit 08/03/2023 12:27:28 PM       ICD-10-CM    1. Acute on chronic congestive heart failure, unspecified heart failure type ([address])  I50.9       2. Respiratory distress  R06.03           Medical Decision Making     Complexity of Problems Addressed:  1 or more chronic illnesses with a severe exacerbation or progression. Data Reviewed and Analyzed:  Category 1:   I independently ordered and reviewed each unique test.  I reviewed external records: provider visit note from outside specialist.       Category 2:   I independently ordered and interpreted the ED EKG in the absence of a Cardiologist.    Rate: 99  EKG Interpretation: EKG Interpretation: sinus rhythm, no evidence of arrhythmia  ST Segments: Nonspecific ST segments - NO STEMI, TWI  I interpreted the X-rays no pneumothorax. Category 3: Discussion of management or test interpretation. 49-year-old male presents to the emergency department with shortness of breath that worsened last night. No fever or chills. There is some cough but nonproductive. No chest pain or back pain. History of advanced CHF. Reports compliance on all of his medications. Reports worsening shortness of breath today which prompted him to seek medical attention. Denies swelling lower extremities or abdomen. On exam patient with increased work of breathing, initial O2 saturation 80%, placed on nasal cannula placed immediately into the room. A broad-based work-up was ordered. Laboratory normal white count, stable H&H, normal electrolytes, BUN 32 creatinine is 2.2, slight increase from his baseline, initial troponin was 155, repeat 147, BNP 7000, normal magnesium, Chest x-ray with cardiomegaly, CT scan of his chest was ordered which did not reveal any evidence of PE or emergent findings. Patient given 40 mg IV Lasix secondary to his prior EF of 10 to 15%.

## 2023-08-03 NOTE — H&P
telemetry monitoring. On oxygen cannula. Patient received one IV Lasix in ER. Monitor and may require more diuretic. Resume amlodipine 10 mg p.o. once a day. Resume empagliflozin 10 mg p.o. once a day. Continue hydralazine 25 mg every 8 hours. Continue Imdur 30 mg p.o. once a day. Patient is on ivabradine 5 mg p.o. twice a day. Continue metoprolol  mg p.o. twice a day  Continue pravastatin 10 mg p.o. once a day    Active Problems:      Hypertension  Plan:   Continue antihypertensive BP as above. Hyperlipidemia  Plan:   Continue Pravastatin as above. I have discussed the plan of care with patient. Patient requires hospital stay as an in-patient and anticipated stay is more than 2 midnights due to the serious nature of the illness. PT/OT evals and PPD needed/ordered? Yes  Diet: ADULT DIET; Regular; Low Sodium (2 gm)  VTE prophylaxis: Lovenox  Code status: Full Code    Hospital Problems:  Principal Problem:    Acute on chronic congestive heart failure (HCC)  Active Problems:    NICM (nonischemic cardiomyopathy) ([address])    ICD (implantable cardioverter-defibrillator) in place    Hypertension    Bilateral lower extremity edema    Hyperlipidemia    Acute decompensated heart failure ([address])  Resolved Problems:    * No resolved hospital problems.  *       Past History:     Past Medical History:   Diagnosis Date    COPD (chronic obstructive pulmonary disease) ([address])     Hypertension        Past Surgical History:   Procedure Laterality Date    CARDIAC PROCEDURE N/A 2023    Left heart cath / coronary angiography performed by Roula Tejeda MD at [facility] CATH LAB    EP DEVICE PROCEDURE N/A 2023    Insert ICD dual performed by Marsha Weaver MD at [facility] CARDIAC CATH LAB        Social History     Tobacco Use    Smoking status: Former     Types: Cigarettes     Quit date: 2023     Years since quittin.2     Passive exposure: Current    Smokeless tobacco: Never Our CT scanners use one or all of the following: Automated exposure control, adjustment of the mA and/or kV according to patient size, iterative reconstruction. FINDINGS: STUDY QUALITY: The exam study quality is good. AIRWAYS: The central airways are patent. LUNGS: Upper lobe predominant centrilobular groundglass opacities. No airspace consolidation. No suspicious pulmonary nodules. PLEURA: No pleural effusion or pneumothorax. HEART: Four-chamber cardiomegaly. No calcified coronary atherosclerosis. No pericardial effusion. THORACIC AORTA: The aorta is normal in caliber. PULMONARY ARTERY: No pulmonary embolus to the segmental level. The main pulmonary artery is normal in caliber. MEDIASTINUM/CONNER: No mediastinal mass or lymphadenopathy. CHEST WALL: Before meals pack within the left anterior chest wall. UPPER ABDOMEN: Reflux of contrast into the hepatic vasculature, suggestive of elevated right heart pressures. BONES: No suspicious osseous lesion. 1.  No pulmonary embolus. 2.  Cardiomegaly without airspace consolidation. Reflux of contrast into the hepatic vasculature, suggestive of elevated right heart pressures. 3.  Upper lobe predominant centrilobular groundglass opacities, favored to represent smoking-related respiratory bronchiolitis. Signed:  Wallace Del Toro MD    Part of this note may have been written by using a voice dictation software. The note has been proof read but may still contain some grammatical/other typographical errors.

## 2023-08-03 NOTE — PROGRESS NOTES
Pt resting in bed quietly. Alert and oriented x 4. Respirations present on 3 L NC. Bed locked and low with call light in reach of pt. No signs of distress, no needs expressed. This RN to assume care at this time.

## 2023-08-03 NOTE — PROGRESS NOTES
Kym Spears TRANSFER - IN REPORT:    Verbal report received from [facility], RN on Thompson Mera  being received from ED for routine progression of patient care      Report consisted of patient's Situation, Background, Assessment and   Recommendations(SBAR). Information from the following report(s) Nurse Handoff Report was reviewed with the receiving nurse. Opportunity for questions and clarification was provided. Assessment completed upon patient's arrival to unit and care assumed. detailed exam

## 2023-08-03 NOTE — ED NOTES
TRANSFER - OUT REPORT:    Verbal report given to Ligia Hunter on [facility]  being transferred to 8th floor for routine progression of patient care       Report consisted of patient's Situation, Background, Assessment and   Recommendations(SBAR). Information from the following report(s) Nurse Handoff Report and ED SBAR was reviewed with the receiving nurse. [employer] Fall Assessment:                           Lines:   Peripheral IV 08/03/23 Right Antecubital (Active)        Opportunity for questions and clarification was provided.       Patient transported with:  transport          Shantell Bejarano RN  08/03/23 2798

## 2023-08-03 NOTE — ACP (ADVANCE CARE PLANNING)
[facility] Hospitalist Service  At the heart of better care     Advance Care Planning   Admit Date:  8/3/2023  9:35 AM   Name:  Miguel Pacheco   Age:  45 y.o. Sex:  male  :  1970   MRN:  [id number]   Room:  [place] [address]    Miguel Pacheco has capacity to make his own decisions:   Yes    If pt unable to make decisions, POA/surrogate decision maker:  Spouse, Apollo     Other people present: This physician     Patient / surrogate decision-maker directed code status:  Full Code    Other ACP topics discussed, if applicable:   Treatment for CHF     Patient or surrogate consented to discussion of the current conditions, workup, management plans, prognosis, and the risk for further deterioration. Time spent: 19 minutes in direct discussion.       Signed:  Reji Choi MD

## 2023-08-04 LAB
ANION GAP SERPL CALC-SCNC: 12 MMOL/L (ref 2–11)
BASOPHILS # BLD: 0.1 K/UL (ref 0–0.2)
BASOPHILS NFR BLD: 1 % (ref 0–2)
BUN SERPL-MCNC: 36 MG/DL (ref 6–23)
CALCIUM SERPL-MCNC: 9 MG/DL (ref 8.3–10.4)
CHLORIDE SERPL-SCNC: 108 MMOL/L (ref 101–110)
CO2 SERPL-SCNC: 21 MMOL/L (ref 21–32)
CREAT SERPL-MCNC: 2.2 MG/DL (ref 0.8–1.5)
DIFFERENTIAL METHOD BLD: ABNORMAL
EOSINOPHIL # BLD: 0 K/UL (ref 0–0.8)
EOSINOPHIL NFR BLD: 0 % (ref 0.5–7.8)
ERYTHROCYTE [DISTWIDTH] IN BLOOD BY AUTOMATED COUNT: 14.4 % (ref 11.9–14.6)
GLUCOSE SERPL-MCNC: 109 MG/DL (ref 65–100)
HCT VFR BLD AUTO: 50.2 % (ref 41.1–50.3)
HGB BLD-MCNC: 16.6 G/DL (ref 13.6–17.2)
IMM GRANULOCYTES # BLD AUTO: 0 K/UL (ref 0–0.5)
IMM GRANULOCYTES NFR BLD AUTO: 0 % (ref 0–5)
LYMPHOCYTES # BLD: 2.5 K/UL (ref 0.5–4.6)
LYMPHOCYTES NFR BLD: 30 % (ref 13–44)
MCH RBC QN AUTO: 30.1 PG (ref 26.1–32.9)
MCHC RBC AUTO-ENTMCNC: 33.1 G/DL (ref 31.4–35)
MCV RBC AUTO: 91.1 FL (ref 82–102)
MONOCYTES # BLD: 0.4 K/UL (ref 0.1–1.3)
MONOCYTES NFR BLD: 5 % (ref 4–12)
NEUTS SEG # BLD: 5.3 K/UL (ref 1.7–8.2)
NEUTS SEG NFR BLD: 64 % (ref 43–78)
NRBC # BLD: 0.03 K/UL (ref 0–0.2)
PLATELET # BLD AUTO: 209 K/UL (ref 150–450)
PMV BLD AUTO: 11.7 FL (ref 9.4–12.3)
POTASSIUM SERPL-SCNC: 4.6 MMOL/L (ref 3.5–5.1)
RBC # BLD AUTO: 5.51 M/UL (ref 4.23–5.6)
SODIUM SERPL-SCNC: 141 MMOL/L (ref 133–143)
WBC # BLD AUTO: 8.3 K/UL (ref 4.3–11.1)

## 2023-08-04 PROCEDURE — 6370000000 HC RX 637 (ALT 250 FOR IP): Performed by: NURSE PRACTITIONER

## 2023-08-04 PROCEDURE — 6370000000 HC RX 637 (ALT 250 FOR IP): Performed by: HOSPITALIST

## 2023-08-04 PROCEDURE — 6360000002 HC RX W HCPCS: Performed by: HOSPITALIST

## 2023-08-04 PROCEDURE — 1100000003 HC PRIVATE W/ TELEMETRY

## 2023-08-04 PROCEDURE — 2580000003 HC RX 258: Performed by: INTERNAL MEDICINE

## 2023-08-04 PROCEDURE — 80048 BASIC METABOLIC PNL TOTAL CA: CPT

## 2023-08-04 PROCEDURE — 99232 SBSQ HOSP IP/OBS MODERATE 35: CPT | Performed by: INTERNAL MEDICINE

## 2023-08-04 PROCEDURE — 36415 COLL VENOUS BLD VENIPUNCTURE: CPT

## 2023-08-04 PROCEDURE — 85025 COMPLETE CBC W/AUTO DIFF WBC: CPT

## 2023-08-04 PROCEDURE — 6370000000 HC RX 637 (ALT 250 FOR IP): Performed by: INTERNAL MEDICINE

## 2023-08-04 RX ORDER — HYDRALAZINE HYDROCHLORIDE 50 MG/1
50 TABLET, FILM COATED ORAL EVERY 8 HOURS SCHEDULED
Status: DISCONTINUED | OUTPATIENT
Start: 2023-08-04 | End: 2023-08-05 | Stop reason: HOSPADM

## 2023-08-04 RX ORDER — FUROSEMIDE 10 MG/ML
40 INJECTION INTRAMUSCULAR; INTRAVENOUS DAILY
Status: DISCONTINUED | OUTPATIENT
Start: 2023-08-05 | End: 2023-08-05 | Stop reason: HOSPADM

## 2023-08-04 RX ORDER — ENOXAPARIN SODIUM 100 MG/ML
30 INJECTION SUBCUTANEOUS EVERY 24 HOURS
Status: DISCONTINUED | OUTPATIENT
Start: 2023-08-04 | End: 2023-08-05 | Stop reason: HOSPADM

## 2023-08-04 RX ORDER — ISOSORBIDE MONONITRATE 30 MG/1
60 TABLET, EXTENDED RELEASE ORAL DAILY
Status: DISCONTINUED | OUTPATIENT
Start: 2023-08-05 | End: 2023-08-05 | Stop reason: HOSPADM

## 2023-08-04 RX ORDER — IPRATROPIUM BROMIDE AND ALBUTEROL SULFATE 2.5; .5 MG/3ML; MG/3ML
1 SOLUTION RESPIRATORY (INHALATION) EVERY 4 HOURS PRN
Status: DISCONTINUED | OUTPATIENT
Start: 2023-08-04 | End: 2023-08-05 | Stop reason: HOSPADM

## 2023-08-04 RX ORDER — HYDRALAZINE HYDROCHLORIDE 20 MG/ML
10 INJECTION INTRAMUSCULAR; INTRAVENOUS EVERY 6 HOURS PRN
Status: DISCONTINUED | OUTPATIENT
Start: 2023-08-04 | End: 2023-08-05 | Stop reason: HOSPADM

## 2023-08-04 RX ORDER — CLONIDINE HYDROCHLORIDE 0.2 MG/1
0.2 TABLET ORAL 2 TIMES DAILY PRN
Status: DISCONTINUED | OUTPATIENT
Start: 2023-08-04 | End: 2023-08-05 | Stop reason: HOSPADM

## 2023-08-04 RX ADMIN — IVABRADINE 5 MG: 5 TABLET, FILM COATED ORAL at 10:31

## 2023-08-04 RX ADMIN — METOPROLOL SUCCINATE 50 MG: 50 TABLET, FILM COATED, EXTENDED RELEASE ORAL at 10:32

## 2023-08-04 RX ADMIN — ENOXAPARIN SODIUM 30 MG: 100 INJECTION SUBCUTANEOUS at 21:46

## 2023-08-04 RX ADMIN — HYDRALAZINE HYDROCHLORIDE 50 MG: 50 TABLET, FILM COATED ORAL at 13:16

## 2023-08-04 RX ADMIN — HYDRALAZINE HYDROCHLORIDE 25 MG: 25 TABLET, FILM COATED ORAL at 06:27

## 2023-08-04 RX ADMIN — HYDRALAZINE HYDROCHLORIDE 50 MG: 50 TABLET, FILM COATED ORAL at 21:46

## 2023-08-04 RX ADMIN — PRAVASTATIN SODIUM 10 MG: 20 TABLET ORAL at 21:45

## 2023-08-04 RX ADMIN — HYDRALAZINE HYDROCHLORIDE 10 MG: 20 INJECTION INTRAMUSCULAR; INTRAVENOUS at 17:56

## 2023-08-04 RX ADMIN — SODIUM CHLORIDE, PRESERVATIVE FREE 5 ML: 5 INJECTION INTRAVENOUS at 10:33

## 2023-08-04 RX ADMIN — ISOSORBIDE MONONITRATE 30 MG: 30 TABLET, EXTENDED RELEASE ORAL at 10:31

## 2023-08-04 RX ADMIN — SODIUM CHLORIDE, PRESERVATIVE FREE 5 ML: 5 INJECTION INTRAVENOUS at 21:46

## 2023-08-04 ASSESSMENT — PAIN SCALES - GENERAL
PAINLEVEL_OUTOF10: 0
PAINLEVEL_OUTOF10: 0

## 2023-08-04 NOTE — PROGRESS NOTES
Patient provided with the Heart Failure Booklet and educational materials at bedside. Teaching emphasis on Call Cardiologist STAT if any of the following are noted:              Shortness of breath; with activity or without while reclined              General weakness              Edema noted individually or grouped  Reviewed the importance of weighing first thing in the morning after urination and recording weight on the provided calendar to take to the Cardiologist for review. If weight gain is more than 2 pounds daily or 5 pounds weekly contact Cardiologist for instructions. Reviewed the importance of cardiac diet and salt / fluid intake reduction. Patient informed they can contact the Heart Failure Navigator with educational questions and their Cardiologist with any other concerns, phone numbers are located in front of Heart Failure Booklet. Patient verbalizes understanding of the Heart Failure education.

## 2023-08-04 NOTE — FLOWSHEET NOTE
08/04/23 Mohan Pineda Received From DANIE Barry   Handoff Communication Face to Face   Time Handoff Given 0438     Resting in bed with no distress noted on 3L. SR up x2, bed low locked with call light within reach.

## 2023-08-04 NOTE — PROGRESS NOTES
[facility] CARDIOLOGY PROGRESS NOTE           8/4/2023 1:25 PM    Admit Date: 8/3/2023      Subjective:   No overnight events. Review of Systems   All other systems reviewed and are negative. Objective:      Vitals:    08/04/23 0615 08/04/23 0748 08/04/23 1117 08/04/23 1119   BP:  (!) 138/113 (!) 139/119 (!) 143/113   Pulse:  (!) 102 100 100   Resp:  20 22    Temp:       TempSrc:  Oral Oral Axillary   SpO2:  94% 100%    Weight: 141 lb 8 oz (64.2 kg)      Height:             Physical Exam  Vitals reviewed. Constitutional:       General: He is not in acute distress. Appearance: Normal appearance. HENT:      Head: Normocephalic and atraumatic. Eyes:      General: No scleral icterus. Neck:      Vascular: No carotid bruit. Cardiovascular:      Rate and Rhythm: Normal rate and regular rhythm. Heart sounds: No murmur heard. Pulmonary:      Breath sounds: Normal breath sounds. Abdominal:      General: Abdomen is flat. Palpations: Abdomen is soft. Musculoskeletal:         General: No swelling. Cervical back: Neck supple. Skin:     General: Skin is warm and dry. Neurological:      Mental Status: He is alert and oriented to person, place, and time.    Psychiatric:         Mood and Affect: Mood normal.       Data Review:   Recent Labs     08/03/23  0944 08/04/23  0453    141   K 5.0 4.6   MG 2.1  --    BUN 32* 36*   WBC 7.3 8.3   HGB 17.0 16.6   HCT 52.0* 50.2    209       [unfilled]  Current Facility-Administered Medications   Medication Dose Route Frequency    enoxaparin Sodium (LOVENOX) injection 30 mg  30 mg SubCUTAneous Q24H    cloNIDine (CATAPRES) tablet 0.2 mg  0.2 mg Oral BID PRN    hydrALAZINE (APRESOLINE) injection 10 mg  10 mg IntraVENous Q6H PRN    hydrALAZINE (APRESOLINE) tablet 50 mg  50 mg Oral 3 times per day    [START ON 8/5/2023] isosorbide mononitrate (IMDUR) extended release tablet 60 mg  60 mg Oral Daily    [START ON 8/5/2023]

## 2023-08-04 NOTE — PROGRESS NOTES
Pt resting in bed quietly. No acute events over night, no signs of distress at this time. Preparing to give report to oncoming RN.

## 2023-08-04 NOTE — PROGRESS NOTES
Hospitalist   Admit Date:  8/3/2023  9:35 AM   Name:  Shankar Salas   Age:  45 y.o. Sex:  male  :  1970   MRN:  [id number]   Room:  [facility]/    Presenting/Chief Complaint: Shortness of Breath     Reason(s) for Admission: Respiratory distress [R06.03]  Acute decompensated heart failure ([address]) [I50.9]  Acute on chronic congestive heart failure, unspecified heart failure type ([address]) [I50.9]     History of Present Illness:     45 y.o. male with medical history of   Severe systolic and diastolic congestive heart failure  Hypertension  Hyperlipidemia  Status post implantable cardioverter defibrillator placement   who presented with shortness of breath today. Normally patient does not require oxygen at home. On the day of admission, patient came to emergency room after experiencing more shortness of breath. He did not miss his medications. Actually he was saying that he might have taken more medication than what he was supposed to do especially diuretic. In the emergency room he was found to have oxygen saturation of 80% on room air initially. He was given oxygen cannula. He was given IV Lasix 1 dose. Work-up shows worsening of renal function with creatinine of 2.2 from baseline of 1.7. Troponin is elevated but not different from baseline. Chest x-ray showed cardiomegaly which is his baseline finding. CT scan of the chest shows no evidence of pulmonary embolism. There is some upper lobe groundglass abnormalities. There is no history of fever. No cough. No phlegm. No sick contact. No dysuria. No diarrhea. Hospitalist service is requested to admit the patient for respiratory failure with hypoxia, worsening of his combined congestive heart failure. Today, felt well earlier then became sob at rest late morning, do not need oxygen.     Assessment & Plan:       Acute on chronic combined congestive heart failure, ef 15%, IO -ve 1500 ml, improving on IV lasix    NICM (nonischemic

## 2023-08-05 VITALS
RESPIRATION RATE: 18 BRPM | BODY MASS INDEX: 19.96 KG/M2 | WEIGHT: 131.7 LBS | DIASTOLIC BLOOD PRESSURE: 96 MMHG | SYSTOLIC BLOOD PRESSURE: 133 MMHG | OXYGEN SATURATION: 99 % | HEART RATE: 91 BPM | TEMPERATURE: 97.5 F | HEIGHT: 68 IN

## 2023-08-05 LAB
ANION GAP SERPL CALC-SCNC: 9 MMOL/L (ref 2–11)
BUN SERPL-MCNC: 34 MG/DL (ref 6–23)
CALCIUM SERPL-MCNC: 8.7 MG/DL (ref 8.3–10.4)
CHLORIDE SERPL-SCNC: 108 MMOL/L (ref 101–110)
CO2 SERPL-SCNC: 22 MMOL/L (ref 21–32)
CREAT SERPL-MCNC: 1.7 MG/DL (ref 0.8–1.5)
GLUCOSE SERPL-MCNC: 92 MG/DL (ref 65–100)
POTASSIUM SERPL-SCNC: 3.9 MMOL/L (ref 3.5–5.1)
SODIUM SERPL-SCNC: 139 MMOL/L (ref 133–143)

## 2023-08-05 PROCEDURE — 6370000000 HC RX 637 (ALT 250 FOR IP): Performed by: INTERNAL MEDICINE

## 2023-08-05 PROCEDURE — 2580000003 HC RX 258: Performed by: INTERNAL MEDICINE

## 2023-08-05 PROCEDURE — 6370000000 HC RX 637 (ALT 250 FOR IP): Performed by: HOSPITALIST

## 2023-08-05 PROCEDURE — 6360000002 HC RX W HCPCS: Performed by: HOSPITALIST

## 2023-08-05 PROCEDURE — 36415 COLL VENOUS BLD VENIPUNCTURE: CPT

## 2023-08-05 PROCEDURE — 80048 BASIC METABOLIC PNL TOTAL CA: CPT

## 2023-08-05 RX ORDER — METOPROLOL SUCCINATE 50 MG/1
100 TABLET, EXTENDED RELEASE ORAL 2 TIMES DAILY
Qty: 360 TABLET | Refills: 0 | Status: SHIPPED | OUTPATIENT
Start: 2023-08-05 | End: 2023-11-03

## 2023-08-05 RX ORDER — FUROSEMIDE 40 MG/1
40 TABLET ORAL 2 TIMES DAILY
Qty: 180 TABLET | Refills: 0 | Status: SHIPPED | OUTPATIENT
Start: 2023-08-05 | End: 2023-11-03

## 2023-08-05 RX ORDER — ISOSORBIDE MONONITRATE 60 MG/1
60 TABLET, EXTENDED RELEASE ORAL DAILY
Qty: 90 TABLET | Refills: 0 | Status: SHIPPED | OUTPATIENT
Start: 2023-08-06 | End: 2023-11-04

## 2023-08-05 RX ADMIN — FUROSEMIDE 40 MG: 10 INJECTION, SOLUTION INTRAMUSCULAR; INTRAVENOUS at 08:35

## 2023-08-05 RX ADMIN — SODIUM CHLORIDE, PRESERVATIVE FREE 10 ML: 5 INJECTION INTRAVENOUS at 08:35

## 2023-08-05 RX ADMIN — HYDRALAZINE HYDROCHLORIDE 50 MG: 50 TABLET, FILM COATED ORAL at 06:29

## 2023-08-05 RX ADMIN — ISOSORBIDE MONONITRATE 60 MG: 30 TABLET, EXTENDED RELEASE ORAL at 08:35

## 2023-08-05 RX ADMIN — IVABRADINE 5 MG: 5 TABLET, FILM COATED ORAL at 08:35

## 2023-08-05 NOTE — CARE COORDINATION
CM reviewed chart. Per chart review, patient not medically stable for discharge. Patient may benefit with [facility] RN at discharge. CM to follow up.
Patient lives at home with spouse and is Independent. He still drives but is not currently working and has been on short term disability. Pt confirms POA, PCP and insurance. May benefit from [facility] RN at discharge - CM will follow clinical course. 08/03/23 1516   Service Assessment   Patient Orientation Alert and Oriented   Cognition Alert   History Provided By Patient   Primary Caregiver Self   Support Systems Spouse/Significant Other   Patient's Healthcare Decision Maker is: Legal Next of Kin   PCP Verified by CM Yes   Last Visit to PCP Within last 3 months   Prior Functional Level Independent in ADLs/IADLs   Current Functional Level Independent in ADLs/IADLs   Can patient return to prior living arrangement Yes   Ability to make needs known: Good   Family able to assist with home care needs: Yes   Would you like for me to discuss the discharge plan with any other family members/significant others, and if so, who? No   Financial Resources Other (Comment)   Community Resources None   Social/Functional History   Lives With Spouse   Type of [address] [place] [address] One level   Home Access Stairs to enter with rails   Entrance Stairs - Number of Steps 3   [facility] None   Receives Help From Family   ADL Assistance Independent   Homemaking Assistance Independent   Homemaking Responsibilities Yes   Ambulation Assistance Independent   Transfer Assistance Independent   Active  Yes   Mode of Transportation Car   Occupation On disability   Discharge Planning   Type of Residence House   Living Arrangements Spouse/Significant Other   Current Services Prior To Admission None   Potential [place]; Other (Comment)  (may need HH RN at [employer])   DME Ordered?  No   Potential Assistance Purchasing Medications No   Type of Home Care Services None   Patient expects to be discharged to: House   One/Two Story Residence One
shares the quality data associated with the providers?   Yes

## 2023-08-05 NOTE — DISCHARGE INSTRUCTIONS
DISCHARGE SUMMARY from Nurse    PATIENT INSTRUCTIONS:    What to do at Home:  Recommended activity: activity as tolerated. If you experience any of the following symptoms shortness of breath, chest pain, or fatigue, please follow up with PCP. *  Please give a list of your current medications to your Primary Care Provider. *  Please update this list whenever your medications are discontinued, doses are      changed, or new medications (including over-the-counter products) are added. *  Please carry medication information at all times in case of emergency situations. These are general instructions for a healthy lifestyle:    No smoking/ No tobacco products/ Avoid exposure to second hand smoke  Surgeon General's Warning:  Quitting smoking now greatly reduces serious risk to your health. Obesity, smoking, and sedentary lifestyle greatly increases your risk for illness    A healthy diet, regular physical exercise & weight monitoring are important for maintaining a healthy lifestyle    You may be retaining fluid if you have a history of heart failure or if you experience any of the following symptoms:  Weight gain of 3 pounds or more overnight or 5 pounds in a week, increased swelling in our hands or feet or shortness of breath while lying flat in bed. Please call your doctor as soon as you notice any of these symptoms; do not wait until your next office visit. The discharge information has been reviewed with the patient. The patient verbalized understanding. Discharge medications reviewed with the patient and appropriate educational materials and side effects teaching were provided.   ___________________________________________________________________________________________________________________________________

## 2023-08-05 NOTE — PROGRESS NOTES
Patient given discharge instruction with time for questions and answers. Patient and family verbalized understanding. Patient to be discharged home. Patient IV out with no difficulty. Patient to be transported by family home.

## 2023-08-08 ENCOUNTER — TELEPHONE (OUTPATIENT)
Age: 53
End: 2023-08-08

## 2023-08-08 NOTE — TELEPHONE ENCOUNTER
Triage called pt for TC call following discharge from [facility] [place] [place]. Adm 8/3/23  D/C 8/5/23  Dx  CHF  S/w pt . Pt reports doing ok since being discharged. Some SOB from time to time. Has not been monitoring wt. Wife states needs to replace battery in scales. Explained the importance of weighing daily. She v/u. She reports pt taking meds as prescribed. Following a low salt diet. Recheck labs in 1 week per hospital d/c summary. Reminded her of TC appt w/ Dr. [place] Pierce 8/17/23. She v/u. To contact our office if needed prior to F/U appt.

## 2023-08-15 NOTE — PROGRESS NOTES
Aldactone, Toprol XL and and Jardiance  - Previously referred to HTS at [address]: Patient has an appointment in October 2023 at [address] for consideration of advanced options (he reports being on a wait list for an earlier appointment)  - Discontinue Imdur with no compelling indication for use  - Instructed the patient to take an extra dose of Lasix for weight gain of 2 lbs in two days or 5 lbs in one week and return to original dose once the weight returns to baseline    2. ICD (implantable cardioverter-defibrillator) in place  - Continue to follow in device clinic    3. Hypertension, unspecified type  - Well controlled  - See \"chronic HFrEF\" re: GDMT    4. Hyperlipidemia, unspecified hyperlipidemia type  - Continue with pravastatin    5.  Ill-defined condition  - Self-reported COPD: Requested that patient reach out to PCP for further assessment and management    The following components were completed:    - Contact the beneficiary or caregiver within two business days following a discharge  - Conduct a follow-up visit within 14 days of discharge  - Medicine reconciliation and management completed  - Discharge information obtained and reviewed  - Reviewed the need for diagnostic tests/treatments and/or follow up on pending diagnostic tests/treatments  - Educated the beneficiary, family member, caregiver, and/or guardian  - Established or re-established referrals with community providers and services, if necessary  - Assisted in scheduling follow-up visits with providers and services, if necessary    F/U: 3 months    Jerald Cruz MD

## 2023-08-17 ENCOUNTER — TELEPHONE (OUTPATIENT)
Age: 53
End: 2023-08-17

## 2023-08-17 ENCOUNTER — OFFICE VISIT (OUTPATIENT)
Age: 53
End: 2023-08-17

## 2023-08-17 VITALS
DIASTOLIC BLOOD PRESSURE: 86 MMHG | WEIGHT: 134 LBS | SYSTOLIC BLOOD PRESSURE: 110 MMHG | HEIGHT: 68 IN | HEART RATE: 80 BPM | BODY MASS INDEX: 20.31 KG/M2

## 2023-08-17 DIAGNOSIS — E78.5 HYPERLIPIDEMIA, UNSPECIFIED HYPERLIPIDEMIA TYPE: ICD-10-CM

## 2023-08-17 DIAGNOSIS — Z95.810 ICD (IMPLANTABLE CARDIOVERTER-DEFIBRILLATOR) IN PLACE: ICD-10-CM

## 2023-08-17 DIAGNOSIS — I50.22 CHRONIC HFREF (HEART FAILURE WITH REDUCED EJECTION FRACTION) (HCC): Primary | ICD-10-CM

## 2023-08-17 DIAGNOSIS — R69 ILL-DEFINED CONDITION: ICD-10-CM

## 2023-08-17 DIAGNOSIS — I10 HYPERTENSION, UNSPECIFIED TYPE: ICD-10-CM

## 2023-08-17 RX ORDER — PRAVASTATIN SODIUM 10 MG
10 TABLET ORAL DAILY
Qty: 90 TABLET | Refills: 3 | Status: SHIPPED | OUTPATIENT
Start: 2023-08-17

## 2023-08-17 ASSESSMENT — PATIENT HEALTH QUESTIONNAIRE - PHQ9
1. LITTLE INTEREST OR PLEASURE IN DOING THINGS: 0
SUM OF ALL RESPONSES TO PHQ QUESTIONS 1-9: 0
SUM OF ALL RESPONSES TO PHQ9 QUESTIONS 1 & 2: 0
2. FEELING DOWN, DEPRESSED OR HOPELESS: 0
SUM OF ALL RESPONSES TO PHQ QUESTIONS 1-9: 0

## 2023-08-17 NOTE — TELEPHONE ENCOUNTER
Per Dr. Tru Murillo, pt was seen in clinic today for follow up. He needs to remain out of work entirely until Transplant appointment at [address] on 10/17/23. I spoke to his employer, [employer]. She asked me to fax the statement to her attention at [phone]. A letter was written & faxed to her attention. faxed confirmation received.

## 2023-08-22 ENCOUNTER — NURSE ONLY (OUTPATIENT)
Age: 53
End: 2023-08-22

## 2023-08-22 ENCOUNTER — OFFICE VISIT (OUTPATIENT)
Age: 53
End: 2023-08-22
Payer: COMMERCIAL

## 2023-08-22 VITALS
HEART RATE: 115 BPM | BODY MASS INDEX: 20.92 KG/M2 | SYSTOLIC BLOOD PRESSURE: 130 MMHG | DIASTOLIC BLOOD PRESSURE: 82 MMHG | WEIGHT: 138 LBS | HEIGHT: 68 IN

## 2023-08-22 DIAGNOSIS — I50.20 HFREF (HEART FAILURE WITH REDUCED EJECTION FRACTION) (HCC): Primary | ICD-10-CM

## 2023-08-22 DIAGNOSIS — I42.8 NICM (NONISCHEMIC CARDIOMYOPATHY) (HCC): Primary | ICD-10-CM

## 2023-08-22 DIAGNOSIS — I50.22 CHRONIC HFREF (HEART FAILURE WITH REDUCED EJECTION FRACTION) (HCC): ICD-10-CM

## 2023-08-22 PROCEDURE — 99214 OFFICE O/P EST MOD 30 MIN: CPT | Performed by: INTERNAL MEDICINE

## 2023-08-22 PROCEDURE — 3075F SYST BP GE 130 - 139MM HG: CPT | Performed by: INTERNAL MEDICINE

## 2023-08-22 PROCEDURE — 3079F DIAST BP 80-89 MM HG: CPT | Performed by: INTERNAL MEDICINE

## 2023-08-22 ASSESSMENT — ENCOUNTER SYMPTOMS
EYES NEGATIVE: 1
COUGH: 0
ALLERGIC/IMMUNOLOGIC NEGATIVE: 1
GASTROINTESTINAL NEGATIVE: 1
SHORTNESS OF BREATH: 0

## 2023-08-24 DIAGNOSIS — Z95.810 ICD (IMPLANTABLE CARDIOVERTER-DEFIBRILLATOR) IN PLACE: ICD-10-CM

## 2023-08-24 DIAGNOSIS — I42.8 NICM (NONISCHEMIC CARDIOMYOPATHY) (HCC): Primary | ICD-10-CM

## 2023-08-24 DIAGNOSIS — I50.22 CHRONIC HFREF (HEART FAILURE WITH REDUCED EJECTION FRACTION) (HCC): ICD-10-CM

## 2023-09-20 ENCOUNTER — APPOINTMENT (OUTPATIENT)
Dept: ULTRASOUND IMAGING | Age: 53
DRG: 871 | End: 2023-09-20
Payer: COMMERCIAL

## 2023-09-20 ENCOUNTER — APPOINTMENT (OUTPATIENT)
Dept: CT IMAGING | Age: 53
DRG: 871 | End: 2023-09-20
Payer: COMMERCIAL

## 2023-09-20 ENCOUNTER — HOSPITAL ENCOUNTER (INPATIENT)
Age: 53
LOS: 5 days | Discharge: HOME | DRG: 871 | End: 2023-09-25
Attending: EMERGENCY MEDICINE | Admitting: FAMILY MEDICINE
Payer: COMMERCIAL

## 2023-09-20 DIAGNOSIS — R10.13 EPIGASTRIC PAIN: ICD-10-CM

## 2023-09-20 DIAGNOSIS — K81.2 ACUTE ON CHRONIC CHOLECYSTITIS: ICD-10-CM

## 2023-09-20 DIAGNOSIS — I42.8 NICM (NONISCHEMIC CARDIOMYOPATHY) (HCC): ICD-10-CM

## 2023-09-20 DIAGNOSIS — J18.9 PNEUMONIA DUE TO INFECTIOUS ORGANISM, UNSPECIFIED LATERALITY, UNSPECIFIED PART OF LUNG: Primary | ICD-10-CM

## 2023-09-20 DIAGNOSIS — I51.3 INTRACARDIAC THROMBUS: ICD-10-CM

## 2023-09-20 PROBLEM — R65.10 SIRS (SYSTEMIC INFLAMMATORY RESPONSE SYNDROME) (HCC): Status: ACTIVE | Noted: 2023-09-20

## 2023-09-20 PROBLEM — J69.0 ASPIRATION PNEUMONIA (HCC): Status: ACTIVE | Noted: 2023-09-20

## 2023-09-20 LAB
ALBUMIN SERPL-MCNC: 2.3 G/DL (ref 3.5–5)
ALBUMIN/GLOB SERPL: 0.6 (ref 0.4–1.6)
ALP SERPL-CCNC: 68 U/L (ref 50–136)
ALT SERPL-CCNC: 22 U/L (ref 12–65)
ANION GAP SERPL CALC-SCNC: 7 MMOL/L (ref 2–11)
APTT PPP: 147.2 SEC (ref 24.5–34.2)
APTT PPP: >200 SEC (ref 24.5–34.2)
AST SERPL-CCNC: 44 U/L (ref 15–37)
BASOPHILS # BLD: 0.1 K/UL (ref 0–0.2)
BASOPHILS NFR BLD: 1 % (ref 0–2)
BILIRUB DIRECT SERPL-MCNC: 1.3 MG/DL
BILIRUB INDIRECT SERPL-MCNC: 3.7 MG/DL (ref 0–1.1)
BILIRUB SERPL-MCNC: 5 MG/DL (ref 0.2–1.1)
BILIRUB SERPL-MCNC: 5.6 MG/DL (ref 0.2–1.1)
BUN SERPL-MCNC: 24 MG/DL (ref 6–23)
CALCIUM SERPL-MCNC: 8.6 MG/DL (ref 8.3–10.4)
CHLORIDE SERPL-SCNC: 104 MMOL/L (ref 101–110)
CO2 SERPL-SCNC: 25 MMOL/L (ref 21–32)
CREAT SERPL-MCNC: 1.6 MG/DL (ref 0.8–1.5)
DIFFERENTIAL METHOD BLD: ABNORMAL
EKG ATRIAL RATE: 104 BPM
EKG DIAGNOSIS: NORMAL
EKG P AXIS: 56 DEGREES
EKG P-R INTERVAL: 188 MS
EKG Q-T INTERVAL: 354 MS
EKG QRS DURATION: 100 MS
EKG QTC CALCULATION (BAZETT): 465 MS
EKG R AXIS: -75 DEGREES
EKG T AXIS: 105 DEGREES
EKG VENTRICULAR RATE: 104 BPM
EOSINOPHIL # BLD: 0.1 K/UL (ref 0–0.8)
EOSINOPHIL NFR BLD: 1 % (ref 0.5–7.8)
ERYTHROCYTE [DISTWIDTH] IN BLOOD BY AUTOMATED COUNT: 15.6 % (ref 11.9–14.6)
ERYTHROCYTE [DISTWIDTH] IN BLOOD BY AUTOMATED COUNT: 15.8 % (ref 11.9–14.6)
GLOBULIN SER CALC-MCNC: 3.8 G/DL (ref 2.8–4.5)
GLUCOSE SERPL-MCNC: 137 MG/DL (ref 65–100)
HCT VFR BLD AUTO: 42.7 % (ref 41.1–50.3)
HCT VFR BLD AUTO: 45.8 % (ref 41.1–50.3)
HGB BLD-MCNC: 14.4 G/DL (ref 13.6–17.2)
HGB BLD-MCNC: 15.5 G/DL (ref 13.6–17.2)
IMM GRANULOCYTES # BLD AUTO: 0 K/UL (ref 0–0.5)
IMM GRANULOCYTES NFR BLD AUTO: 0 % (ref 0–5)
INR PPP: 1.2
INR PPP: 1.6
LIPASE SERPL-CCNC: 31 U/L (ref 73–393)
LYMPHOCYTES # BLD: 1.8 K/UL (ref 0.5–4.6)
LYMPHOCYTES NFR BLD: 17 % (ref 13–44)
MAGNESIUM SERPL-MCNC: 1.7 MG/DL (ref 1.8–2.4)
MCH RBC QN AUTO: 28.6 PG (ref 26.1–32.9)
MCH RBC QN AUTO: 29 PG (ref 26.1–32.9)
MCHC RBC AUTO-ENTMCNC: 33.7 G/DL (ref 31.4–35)
MCHC RBC AUTO-ENTMCNC: 33.8 G/DL (ref 31.4–35)
MCV RBC AUTO: 84.7 FL (ref 82–102)
MCV RBC AUTO: 85.6 FL (ref 82–102)
MONOCYTES # BLD: 0.6 K/UL (ref 0.1–1.3)
MONOCYTES NFR BLD: 5 % (ref 4–12)
NEUTS SEG # BLD: 8.2 K/UL (ref 1.7–8.2)
NEUTS SEG NFR BLD: 76 % (ref 43–78)
NRBC # BLD: 0 K/UL (ref 0–0.2)
NRBC # BLD: 0 K/UL (ref 0–0.2)
PLATELET # BLD AUTO: 201 K/UL (ref 150–450)
PLATELET # BLD AUTO: 216 K/UL (ref 150–450)
PMV BLD AUTO: 11.5 FL (ref 9.4–12.3)
PMV BLD AUTO: 11.7 FL (ref 9.4–12.3)
POTASSIUM SERPL-SCNC: 4.6 MMOL/L (ref 3.5–5.1)
PROT SERPL-MCNC: 6.1 G/DL (ref 6.3–8.2)
PROTHROMBIN TIME: 15.8 SEC (ref 12.6–14.3)
PROTHROMBIN TIME: 19.8 SEC (ref 12.6–14.3)
RBC # BLD AUTO: 5.04 M/UL (ref 4.23–5.6)
RBC # BLD AUTO: 5.35 M/UL (ref 4.23–5.6)
SODIUM SERPL-SCNC: 136 MMOL/L (ref 133–143)
TROPONIN I SERPL HS-MCNC: 163 PG/ML (ref 0–14)
UFH PPP CHRO-ACNC: 0.37 IU/ML (ref 0.3–0.7)
UFH PPP CHRO-ACNC: 0.68 IU/ML (ref 0.3–0.7)
UFH PPP CHRO-ACNC: >1.1 IU/ML (ref 0.3–0.7)
WBC # BLD AUTO: 10.7 K/UL (ref 4.3–11.1)
WBC # BLD AUTO: 12.4 K/UL (ref 4.3–11.1)

## 2023-09-20 PROCEDURE — 85027 COMPLETE CBC AUTOMATED: CPT

## 2023-09-20 PROCEDURE — 96375 TX/PRO/DX INJ NEW DRUG ADDON: CPT

## 2023-09-20 PROCEDURE — 6370000000 HC RX 637 (ALT 250 FOR IP): Performed by: FAMILY MEDICINE

## 2023-09-20 PROCEDURE — 83735 ASSAY OF MAGNESIUM: CPT

## 2023-09-20 PROCEDURE — 99285 EMERGENCY DEPT VISIT HI MDM: CPT

## 2023-09-20 PROCEDURE — 2100000000 HC CCU R&B

## 2023-09-20 PROCEDURE — 6360000002 HC RX W HCPCS: Performed by: FAMILY MEDICINE

## 2023-09-20 PROCEDURE — 76705 ECHO EXAM OF ABDOMEN: CPT

## 2023-09-20 PROCEDURE — 2500000003 HC RX 250 WO HCPCS: Performed by: FAMILY MEDICINE

## 2023-09-20 PROCEDURE — 36415 COLL VENOUS BLD VENIPUNCTURE: CPT

## 2023-09-20 PROCEDURE — 6360000004 HC RX CONTRAST MEDICATION: Performed by: EMERGENCY MEDICINE

## 2023-09-20 PROCEDURE — 80053 COMPREHEN METABOLIC PANEL: CPT

## 2023-09-20 PROCEDURE — 85025 COMPLETE CBC W/AUTO DIFF WBC: CPT

## 2023-09-20 PROCEDURE — 93005 ELECTROCARDIOGRAM TRACING: CPT | Performed by: EMERGENCY MEDICINE

## 2023-09-20 PROCEDURE — 96365 THER/PROPH/DIAG IV INF INIT: CPT

## 2023-09-20 PROCEDURE — 74177 CT ABD & PELVIS W/CONTRAST: CPT

## 2023-09-20 PROCEDURE — 85610 PROTHROMBIN TIME: CPT

## 2023-09-20 PROCEDURE — 87040 BLOOD CULTURE FOR BACTERIA: CPT

## 2023-09-20 PROCEDURE — 84484 ASSAY OF TROPONIN QUANT: CPT

## 2023-09-20 PROCEDURE — 85730 THROMBOPLASTIN TIME PARTIAL: CPT

## 2023-09-20 PROCEDURE — 2500000003 HC RX 250 WO HCPCS: Performed by: EMERGENCY MEDICINE

## 2023-09-20 PROCEDURE — 96376 TX/PRO/DX INJ SAME DRUG ADON: CPT

## 2023-09-20 PROCEDURE — 82247 BILIRUBIN TOTAL: CPT

## 2023-09-20 PROCEDURE — 85520 HEPARIN ASSAY: CPT

## 2023-09-20 PROCEDURE — 83690 ASSAY OF LIPASE: CPT

## 2023-09-20 PROCEDURE — 2580000003 HC RX 258: Performed by: FAMILY MEDICINE

## 2023-09-20 PROCEDURE — 82248 BILIRUBIN DIRECT: CPT

## 2023-09-20 PROCEDURE — 6360000002 HC RX W HCPCS: Performed by: EMERGENCY MEDICINE

## 2023-09-20 PROCEDURE — 93010 ELECTROCARDIOGRAM REPORT: CPT | Performed by: INTERNAL MEDICINE

## 2023-09-20 PROCEDURE — 2580000003 HC RX 258: Performed by: EMERGENCY MEDICINE

## 2023-09-20 RX ORDER — MAGNESIUM HYDROXIDE/ALUMINUM HYDROXICE/SIMETHICONE 120; 1200; 1200 MG/30ML; MG/30ML; MG/30ML
30 SUSPENSION ORAL EVERY 6 HOURS PRN
Status: DISCONTINUED | OUTPATIENT
Start: 2023-09-20 | End: 2023-09-25 | Stop reason: HOSPADM

## 2023-09-20 RX ORDER — FUROSEMIDE 40 MG/1
40 TABLET ORAL 2 TIMES DAILY
Status: DISCONTINUED | OUTPATIENT
Start: 2023-09-20 | End: 2023-09-25 | Stop reason: HOSPADM

## 2023-09-20 RX ORDER — MAGNESIUM SULFATE 1 G/100ML
1000 INJECTION INTRAVENOUS ONCE
Status: COMPLETED | OUTPATIENT
Start: 2023-09-20 | End: 2023-09-20

## 2023-09-20 RX ORDER — POLYETHYLENE GLYCOL 3350 17 G/17G
17 POWDER, FOR SOLUTION ORAL DAILY PRN
Status: DISCONTINUED | OUTPATIENT
Start: 2023-09-20 | End: 2023-09-21

## 2023-09-20 RX ORDER — SODIUM CHLORIDE 0.9 % (FLUSH) 0.9 %
5-40 SYRINGE (ML) INJECTION PRN
Status: DISCONTINUED | OUTPATIENT
Start: 2023-09-20 | End: 2023-09-25 | Stop reason: HOSPADM

## 2023-09-20 RX ORDER — ONDANSETRON 2 MG/ML
4 INJECTION INTRAMUSCULAR; INTRAVENOUS
Status: COMPLETED | OUTPATIENT
Start: 2023-09-20 | End: 2023-09-20

## 2023-09-20 RX ORDER — SODIUM CHLORIDE 9 MG/ML
INJECTION, SOLUTION INTRAVENOUS PRN
Status: DISCONTINUED | OUTPATIENT
Start: 2023-09-20 | End: 2023-09-25 | Stop reason: ALTCHOICE

## 2023-09-20 RX ORDER — METOPROLOL SUCCINATE 100 MG/1
100 TABLET, EXTENDED RELEASE ORAL 2 TIMES DAILY
Status: DISCONTINUED | OUTPATIENT
Start: 2023-09-20 | End: 2023-09-25 | Stop reason: HOSPADM

## 2023-09-20 RX ORDER — SODIUM CHLORIDE 0.9 % (FLUSH) 0.9 %
5-40 SYRINGE (ML) INJECTION EVERY 12 HOURS SCHEDULED
Status: DISCONTINUED | OUTPATIENT
Start: 2023-09-20 | End: 2023-09-25 | Stop reason: HOSPADM

## 2023-09-20 RX ORDER — SODIUM CHLORIDE 0.9 % (FLUSH) 0.9 %
10 SYRINGE (ML) INJECTION
Status: DISCONTINUED | OUTPATIENT
Start: 2023-09-20 | End: 2023-09-25 | Stop reason: HOSPADM

## 2023-09-20 RX ORDER — MORPHINE SULFATE 4 MG/ML
4 INJECTION INTRAVENOUS ONCE
Status: COMPLETED | OUTPATIENT
Start: 2023-09-20 | End: 2023-09-20

## 2023-09-20 RX ORDER — POTASSIUM CHLORIDE 20 MEQ/1
40 TABLET, EXTENDED RELEASE ORAL PRN
Status: DISCONTINUED | OUTPATIENT
Start: 2023-09-20 | End: 2023-09-25 | Stop reason: HOSPADM

## 2023-09-20 RX ORDER — POTASSIUM CHLORIDE 7.45 MG/ML
10 INJECTION INTRAVENOUS PRN
Status: DISCONTINUED | OUTPATIENT
Start: 2023-09-20 | End: 2023-09-25 | Stop reason: HOSPADM

## 2023-09-20 RX ORDER — SPIRONOLACTONE 25 MG/1
25 TABLET ORAL DAILY
Status: DISCONTINUED | OUTPATIENT
Start: 2023-09-21 | End: 2023-09-25 | Stop reason: HOSPADM

## 2023-09-20 RX ORDER — 0.9 % SODIUM CHLORIDE 0.9 %
100 INTRAVENOUS SOLUTION INTRAVENOUS
Status: COMPLETED | OUTPATIENT
Start: 2023-09-20 | End: 2023-09-20

## 2023-09-20 RX ORDER — 0.9 % SODIUM CHLORIDE 0.9 %
1000 INTRAVENOUS SOLUTION INTRAVENOUS
Status: COMPLETED | OUTPATIENT
Start: 2023-09-20 | End: 2023-09-20

## 2023-09-20 RX ORDER — HEPARIN SODIUM 1000 [USP'U]/ML
80 INJECTION, SOLUTION INTRAVENOUS; SUBCUTANEOUS PRN
Status: DISCONTINUED | OUTPATIENT
Start: 2023-09-20 | End: 2023-09-24

## 2023-09-20 RX ORDER — MAGNESIUM SULFATE IN WATER 40 MG/ML
2000 INJECTION, SOLUTION INTRAVENOUS PRN
Status: DISCONTINUED | OUTPATIENT
Start: 2023-09-20 | End: 2023-09-25 | Stop reason: HOSPADM

## 2023-09-20 RX ORDER — HEPARIN SODIUM 1000 [USP'U]/ML
40 INJECTION, SOLUTION INTRAVENOUS; SUBCUTANEOUS PRN
Status: DISCONTINUED | OUTPATIENT
Start: 2023-09-20 | End: 2023-09-24

## 2023-09-20 RX ORDER — ACETAMINOPHEN 325 MG/1
650 TABLET ORAL EVERY 6 HOURS PRN
Status: DISCONTINUED | OUTPATIENT
Start: 2023-09-20 | End: 2023-09-21

## 2023-09-20 RX ORDER — ACETAMINOPHEN 650 MG/1
650 SUPPOSITORY RECTAL EVERY 6 HOURS PRN
Status: DISCONTINUED | OUTPATIENT
Start: 2023-09-20 | End: 2023-09-21

## 2023-09-20 RX ORDER — LEVALBUTEROL INHALATION SOLUTION 0.63 MG/3ML
0.63 SOLUTION RESPIRATORY (INHALATION) EVERY 8 HOURS PRN
Status: DISCONTINUED | OUTPATIENT
Start: 2023-09-20 | End: 2023-09-25 | Stop reason: HOSPADM

## 2023-09-20 RX ORDER — BISACODYL 10 MG
10 SUPPOSITORY, RECTAL RECTAL DAILY PRN
Status: DISCONTINUED | OUTPATIENT
Start: 2023-09-20 | End: 2023-09-25 | Stop reason: HOSPADM

## 2023-09-20 RX ORDER — HEPARIN SODIUM 10000 [USP'U]/100ML
5-30 INJECTION, SOLUTION INTRAVENOUS CONTINUOUS
Status: DISCONTINUED | OUTPATIENT
Start: 2023-09-20 | End: 2023-09-24 | Stop reason: ALTCHOICE

## 2023-09-20 RX ORDER — FAMOTIDINE 20 MG/1
10 TABLET, FILM COATED ORAL DAILY PRN
Status: DISCONTINUED | OUTPATIENT
Start: 2023-09-20 | End: 2023-09-22

## 2023-09-20 RX ORDER — HEPARIN SODIUM 1000 [USP'U]/ML
80 INJECTION, SOLUTION INTRAVENOUS; SUBCUTANEOUS ONCE
Status: COMPLETED | OUTPATIENT
Start: 2023-09-20 | End: 2023-09-20

## 2023-09-20 RX ORDER — MORPHINE SULFATE 2 MG/ML
1 INJECTION, SOLUTION INTRAMUSCULAR; INTRAVENOUS EVERY 6 HOURS PRN
Status: DISCONTINUED | OUTPATIENT
Start: 2023-09-20 | End: 2023-09-25 | Stop reason: HOSPADM

## 2023-09-20 RX ADMIN — SODIUM CHLORIDE 1000 ML: 9 INJECTION, SOLUTION INTRAVENOUS at 14:17

## 2023-09-20 RX ADMIN — PIPERACILLIN AND TAZOBACTAM 4500 MG: 4; .5 INJECTION, POWDER, FOR SOLUTION INTRAVENOUS at 18:27

## 2023-09-20 RX ADMIN — SODIUM CHLORIDE, PRESERVATIVE FREE 10 ML: 5 INJECTION INTRAVENOUS at 21:19

## 2023-09-20 RX ADMIN — MORPHINE SULFATE 4 MG: 4 INJECTION INTRAVENOUS at 15:36

## 2023-09-20 RX ADMIN — TUBERCULIN PURIFIED PROTEIN DERIVATIVE 5 UNITS: 5 INJECTION, SOLUTION INTRADERMAL at 18:26

## 2023-09-20 RX ADMIN — ONDANSETRON 4 MG: 2 INJECTION INTRAMUSCULAR; INTRAVENOUS at 14:16

## 2023-09-20 RX ADMIN — HEPARIN SODIUM 18 UNITS/KG/HR: 10000 INJECTION, SOLUTION INTRAVENOUS at 16:23

## 2023-09-20 RX ADMIN — HEPARIN SODIUM 5080 UNITS: 1000 INJECTION INTRAVENOUS; SUBCUTANEOUS at 16:23

## 2023-09-20 RX ADMIN — PIPERACILLIN AND TAZOBACTAM 4500 MG: 4; .5 INJECTION, POWDER, FOR SOLUTION INTRAVENOUS at 14:14

## 2023-09-20 RX ADMIN — FUROSEMIDE 40 MG: 40 TABLET ORAL at 21:13

## 2023-09-20 RX ADMIN — MAGNESIUM SULFATE HEPTAHYDRATE 1000 MG: 1 INJECTION, SOLUTION INTRAVENOUS at 18:55

## 2023-09-20 RX ADMIN — MORPHINE SULFATE 1 MG: 2 INJECTION, SOLUTION INTRAMUSCULAR; INTRAVENOUS at 21:21

## 2023-09-20 RX ADMIN — PIPERACILLIN AND TAZOBACTAM 3375 MG: 3; .375 INJECTION, POWDER, LYOPHILIZED, FOR SOLUTION INTRAVENOUS at 22:30

## 2023-09-20 RX ADMIN — METOPROLOL SUCCINATE 100 MG: 100 TABLET, EXTENDED RELEASE ORAL at 21:13

## 2023-09-20 RX ADMIN — IOPAMIDOL 100 ML: 755 INJECTION, SOLUTION INTRAVENOUS at 11:53

## 2023-09-20 RX ADMIN — SODIUM CHLORIDE 100 ML: 9 INJECTION, SOLUTION INTRAVENOUS at 11:53

## 2023-09-20 RX ADMIN — ONDANSETRON 4 MG: 2 INJECTION INTRAMUSCULAR; INTRAVENOUS at 15:36

## 2023-09-20 ASSESSMENT — PAIN DESCRIPTION - DESCRIPTORS
DESCRIPTORS: ACHING;DISCOMFORT;SHARP
DESCRIPTORS: NAGGING

## 2023-09-20 ASSESSMENT — ENCOUNTER SYMPTOMS
VOMITING: 1
HEMATEMESIS: 0
BELCHING: 0
BACK PAIN: 0
ABDOMINAL PAIN: 1
NAUSEA: 0
SHORTNESS OF BREATH: 0
COLOR CHANGE: 0
EYE PAIN: 0
DIARRHEA: 1
COUGH: 0

## 2023-09-20 ASSESSMENT — PAIN SCALES - GENERAL
PAINLEVEL_OUTOF10: 0
PAINLEVEL_OUTOF10: 5
PAINLEVEL_OUTOF10: 6
PAINLEVEL_OUTOF10: 0
PAINLEVEL_OUTOF10: 0
PAINLEVEL_OUTOF10: 6

## 2023-09-20 ASSESSMENT — PAIN DESCRIPTION - LOCATION
LOCATION: ABDOMEN

## 2023-09-20 ASSESSMENT — PAIN DESCRIPTION - PAIN TYPE: TYPE: ACUTE PAIN

## 2023-09-20 ASSESSMENT — LIFESTYLE VARIABLES
HOW OFTEN DO YOU HAVE A DRINK CONTAINING ALCOHOL: MONTHLY OR LESS
HOW MANY STANDARD DRINKS CONTAINING ALCOHOL DO YOU HAVE ON A TYPICAL DAY: 1 OR 2

## 2023-09-20 ASSESSMENT — PAIN DESCRIPTION - ORIENTATION: ORIENTATION: MID

## 2023-09-20 ASSESSMENT — PAIN - FUNCTIONAL ASSESSMENT: PAIN_FUNCTIONAL_ASSESSMENT: 0-10

## 2023-09-20 ASSESSMENT — PAIN DESCRIPTION - FREQUENCY: FREQUENCY: CONTINUOUS

## 2023-09-20 NOTE — ED NOTES
TRANSFER - OUT REPORT:    Verbal report given to [address] [place] [address] on Juancho Lewis  being transferred to CCU for routine progression of patient care       Report consisted of patient's Situation, Background, Assessment and   Recommendations(SBAR). Information from the following report(s) Nurse Handoff Report and ED SBAR was reviewed with the receiving nurse. [place] Fall Assessment:    Presents to emergency department  because of falls (Syncope, seizure, or loss of consciousness): No  Age > 70: No  Altered Mental Status, Intoxication with alcohol or substance confusion (Disorientation, impaired judgment, poor safety awaremess, or inability to follow instructions): No  Impaired Mobility: Ambulates or transfers with assistive devices or assistance; Unable to ambulate or transer.: No  Nursing Judgement: No          Lines:   Peripheral IV 09/20/23 Proximal;Right; Anterior Cephalic (Active)       Peripheral IV 09/20/23 Left;Proximal;Anterior Forearm (Active)        Opportunity for questions and clarification was provided.       Patient transported with:  Monitor and Registered Nurse          Marcy Ledbetter RN  09/20/23 9716

## 2023-09-20 NOTE — ED PROVIDER NOTES
dictation software was used during the making of this note. This software is not perfect and grammatical and other typographical errors may be present. This note has not been completely proofread for errors.      Ayaan Castañeda MD  09/20/23 40 Stan Busby MD  09/20/23 9044

## 2023-09-20 NOTE — ED TRIAGE NOTES
Pt reports pain to upper abdomen/substernal x3days (+)diarrhea, emesis after PO intake yesterday then \"started spitting up blood\" (-)nausea, dysuria, fever  A&Ox4

## 2023-09-20 NOTE — H&P
gallbladder wall thickening is likely related to right heart failure suggested by CT. The common duct is normal in caliber at 5 mm, and no significant intrahepatic bilary ductal dilatation is evident. No focal liver lesion is seen. The pancreas is normal in contour and echogenicity where seen. The right kidney is not obstructed. Portal venous flow is within normal limits, accounting for the right heart failure. PROBABLE WALL THICKENING AND PERICHOLECYSTIC FLUID WITH NO GALLSTONES AND NEGATIVE \"SONOGRAPHIC LOCKWOOD'S SIGN\" IS LIKELY SECONDARY TO RIGHT HEART FAILURE SUGGESTED BY CT EARLIER TODAY. THERE NO FINDINGS TO SUGGEST ACUTE CHOLECYSTITIS. This case was reviewed in consultation with colleagues. Signed:  Yola Mann DO    Part of this note may have been written by using a voice dictation software. The note has been proof read but may still contain some grammatical/other typographical errors.

## 2023-09-20 NOTE — ACP (ADVANCE CARE PLANNING)
[facility] Hospitalist Service  At the heart of better care     Advance Care Planning   Admit Date:  2023 10:23 AM   Name:  Natalia Castillo   Age:  52 y.o. Sex:  male  :  1970   MRN:  [id number]   Room:  [place] [address]    Natalia Castillo has capacity to make his own decisions:   Yes    Patient / surrogate decision-maker directed code status:  Full Code    Patient or surrogate consented to discussion of the current conditions, workup, management plans, prognosis, and the risk for further deterioration. Time spent: 17 minutes in direct discussion.       Signed:  Gloria Kelsey DO

## 2023-09-21 ENCOUNTER — APPOINTMENT (OUTPATIENT)
Dept: NON INVASIVE DIAGNOSTICS | Age: 53
DRG: 871 | End: 2023-09-21
Attending: EMERGENCY MEDICINE
Payer: COMMERCIAL

## 2023-09-21 PROBLEM — R53.83 OTHER FATIGUE: Status: ACTIVE | Noted: 2023-09-21

## 2023-09-21 PROBLEM — R91.8 PULMONARY INFILTRATES: Status: ACTIVE | Noted: 2023-09-21

## 2023-09-21 PROBLEM — R10.13 EPIGASTRIC PAIN: Status: ACTIVE | Noted: 2023-09-21

## 2023-09-21 PROBLEM — Z51.5 ENCOUNTER FOR PALLIATIVE CARE: Status: ACTIVE | Noted: 2023-09-21

## 2023-09-21 PROBLEM — E80.4 GILBERT'S SYNDROME: Status: ACTIVE | Noted: 2023-09-21

## 2023-09-21 PROBLEM — Z71.89 ADVANCED CARE PLANNING/COUNSELING DISCUSSION: Status: ACTIVE | Noted: 2023-09-21

## 2023-09-21 PROBLEM — D72.829 LEUKOCYTOSIS: Status: ACTIVE | Noted: 2023-09-21

## 2023-09-21 PROBLEM — E80.6 HYPERBILIRUBINEMIA: Status: ACTIVE | Noted: 2023-09-21

## 2023-09-21 PROBLEM — R11.2 NAUSEA AND VOMITING: Status: ACTIVE | Noted: 2023-09-21

## 2023-09-21 LAB
ALBUMIN SERPL-MCNC: 1.9 G/DL (ref 3.5–5)
ALBUMIN/GLOB SERPL: 0.5 (ref 0.4–1.6)
ALP SERPL-CCNC: 50 U/L (ref 50–136)
ALT SERPL-CCNC: 13 U/L (ref 12–65)
ANION GAP SERPL CALC-SCNC: 11 MMOL/L (ref 2–11)
AST SERPL-CCNC: 9 U/L (ref 15–37)
BASOPHILS # BLD: 0.1 K/UL (ref 0–0.2)
BASOPHILS NFR BLD: 1 % (ref 0–2)
BILIRUB DIRECT SERPL-MCNC: 1.5 MG/DL
BILIRUB SERPL-MCNC: 4.3 MG/DL (ref 0.2–1.1)
BUN SERPL-MCNC: 27 MG/DL (ref 6–23)
CALCIUM SERPL-MCNC: 8.1 MG/DL (ref 8.3–10.4)
CHLORIDE SERPL-SCNC: 108 MMOL/L (ref 101–110)
CO2 SERPL-SCNC: 21 MMOL/L (ref 21–32)
CREAT SERPL-MCNC: 1.6 MG/DL (ref 0.8–1.5)
DIFFERENTIAL METHOD BLD: ABNORMAL
ECHO AO ASC DIAM: 3.3 CM
ECHO AO ASCENDING AORTA INDEX: 1.85 CM/M2
ECHO AO ROOT DIAM: 3.1 CM
ECHO AO ROOT INDEX: 1.74 CM/M2
ECHO AV AREA PEAK VELOCITY: 3.6 CM2
ECHO AV AREA VTI: 3.1 CM2
ECHO AV AREA/BSA PEAK VELOCITY: 2 CM2/M2
ECHO AV AREA/BSA VTI: 1.7 CM2/M2
ECHO AV MEAN GRADIENT: 2 MMHG
ECHO AV MEAN VELOCITY: 0.6 M/S
ECHO AV PEAK GRADIENT: 3 MMHG
ECHO AV PEAK VELOCITY: 0.8 M/S
ECHO AV VELOCITY RATIO: 1
ECHO AV VTI: 11.4 CM
ECHO BSA: 1.77 M2
ECHO EST RA PRESSURE: 15 MMHG
ECHO IVC PROX: 2.4 CM
ECHO LA AREA 2C: 39.4 CM2
ECHO LA AREA 4C: 31.6 CM2
ECHO LA DIAMETER INDEX: 2.92 CM/M2
ECHO LA DIAMETER: 5.2 CM
ECHO LA MAJOR AXIS: 7.6 CM
ECHO LA MINOR AXIS: 7.7 CM
ECHO LA TO AORTIC ROOT RATIO: 1.68
ECHO LA VOL 2C: 161 ML (ref 18–58)
ECHO LA VOL 4C: 105 ML (ref 18–58)
ECHO LA VOL BP: 131 ML (ref 18–58)
ECHO LA VOL/BSA BIPLANE: 74 ML/M2 (ref 16–34)
ECHO LA VOLUME INDEX A2C: 90 ML/M2 (ref 16–34)
ECHO LA VOLUME INDEX A4C: 59 ML/M2 (ref 16–34)
ECHO LV E' LATERAL VELOCITY: 15 CM/S
ECHO LV E' SEPTAL VELOCITY: 7 CM/S
ECHO LV EDV A2C: 268 ML
ECHO LV EDV A4C: 280 ML
ECHO LV EDV INDEX A4C: 157 ML/M2
ECHO LV EDV NDEX A2C: 151 ML/M2
ECHO LV EJECTION FRACTION A2C: 13 %
ECHO LV EJECTION FRACTION A4C: 10 %
ECHO LV EJECTION FRACTION BIPLANE: 13 % (ref 55–100)
ECHO LV ESV A2C: 233 ML
ECHO LV ESV A4C: 253 ML
ECHO LV ESV INDEX A2C: 131 ML/M2
ECHO LV ESV INDEX A4C: 142 ML/M2
ECHO LV FRACTIONAL SHORTENING: 2 % (ref 28–44)
ECHO LV INTERNAL DIMENSION DIASTOLE INDEX: 3.15 CM/M2
ECHO LV INTERNAL DIMENSION DIASTOLIC: 5.6 CM (ref 4.2–5.9)
ECHO LV INTERNAL DIMENSION SYSTOLIC INDEX: 3.09 CM/M2
ECHO LV INTERNAL DIMENSION SYSTOLIC: 5.5 CM
ECHO LV IVSD: 0.8 CM (ref 0.6–1)
ECHO LV MASS 2D: 165 G (ref 88–224)
ECHO LV MASS INDEX 2D: 92.7 G/M2 (ref 49–115)
ECHO LV POSTERIOR WALL DIASTOLIC: 0.8 CM (ref 0.6–1)
ECHO LV RELATIVE WALL THICKNESS RATIO: 0.29
ECHO LVOT AREA: 3.8 CM2
ECHO LVOT AV VTI INDEX: 0.82
ECHO LVOT DIAM: 2.2 CM
ECHO LVOT MEAN GRADIENT: 1 MMHG
ECHO LVOT PEAK GRADIENT: 2 MMHG
ECHO LVOT PEAK VELOCITY: 0.8 M/S
ECHO LVOT STROKE VOLUME INDEX: 20.1 ML/M2
ECHO LVOT SV: 35.7 ML
ECHO LVOT VTI: 9.4 CM
ECHO MV A VELOCITY: 0.48 M/S
ECHO MV E DECELERATION TIME (DT): 137 MS
ECHO MV E VELOCITY: 0.58 M/S
ECHO MV E/A RATIO: 1.21
ECHO MV E/E' LATERAL: 3.87
ECHO MV E/E' RATIO (AVERAGED): 6.08
ECHO MV E/E' SEPTAL: 8.29
ECHO PULMONARY ARTERY END DIASTOLIC PRESSURE: 16 MMHG
ECHO PV ACCELERATION TIME (AT): 87 MS
ECHO PV MAX VELOCITY: 0.6 M/S
ECHO PV PEAK GRADIENT: 1 MMHG
ECHO PV REGURGITANT MAX VELOCITY: 2 M/S
ECHO RIGHT VENTRICULAR SYSTOLIC PRESSURE (RVSP): 61 MMHG
ECHO RV BASAL DIMENSION: 5.1 CM
ECHO RV FREE WALL PEAK S': 11 CM/S
ECHO RV INTERNAL DIMENSION: 3.5 CM
ECHO RV LONGITUDINAL DIMENSION: 9.6 CM
ECHO RV MID DIMENSION: 3.1 CM
ECHO RV TAPSE: 1.4 CM (ref 1.7–?)
ECHO TV REGURGITANT MAX VELOCITY: 3.38 M/S
ECHO TV REGURGITANT PEAK GRADIENT: 46 MMHG
EOSINOPHIL # BLD: 0 K/UL (ref 0–0.8)
EOSINOPHIL NFR BLD: 0 % (ref 0.5–7.8)
ERYTHROCYTE [DISTWIDTH] IN BLOOD BY AUTOMATED COUNT: 15.5 % (ref 11.9–14.6)
GLOBULIN SER CALC-MCNC: 3.6 G/DL (ref 2.8–4.5)
GLUCOSE SERPL-MCNC: 114 MG/DL (ref 65–100)
HCT VFR BLD AUTO: 40 % (ref 41.1–50.3)
HGB BLD-MCNC: 13.6 G/DL (ref 13.6–17.2)
IMM GRANULOCYTES # BLD AUTO: 0.1 K/UL (ref 0–0.5)
IMM GRANULOCYTES NFR BLD AUTO: 0 % (ref 0–5)
LYMPHOCYTES # BLD: 1.9 K/UL (ref 0.5–4.6)
LYMPHOCYTES NFR BLD: 14 % (ref 13–44)
MCH RBC QN AUTO: 28.6 PG (ref 26.1–32.9)
MCHC RBC AUTO-ENTMCNC: 34 G/DL (ref 31.4–35)
MCV RBC AUTO: 84.2 FL (ref 82–102)
MM INDURATION, POC: 0 MM (ref 0–5)
MONOCYTES # BLD: 0.9 K/UL (ref 0.1–1.3)
MONOCYTES NFR BLD: 7 % (ref 4–12)
NEUTS SEG # BLD: 10.4 K/UL (ref 1.7–8.2)
NEUTS SEG NFR BLD: 78 % (ref 43–78)
NRBC # BLD: 0 K/UL (ref 0–0.2)
PLATELET # BLD AUTO: 199 K/UL (ref 150–450)
PMV BLD AUTO: 11.5 FL (ref 9.4–12.3)
POTASSIUM SERPL-SCNC: 3.2 MMOL/L (ref 3.5–5.1)
PPD, POC: NEGATIVE
PROT SERPL-MCNC: 5.5 G/DL (ref 6.3–8.2)
RBC # BLD AUTO: 4.75 M/UL (ref 4.23–5.6)
SODIUM SERPL-SCNC: 140 MMOL/L (ref 133–143)
TROPONIN I SERPL HS-MCNC: 143.5 PG/ML (ref 0–14)
UFH PPP CHRO-ACNC: 0.27 IU/ML (ref 0.3–0.7)
UFH PPP CHRO-ACNC: 0.49 IU/ML (ref 0.3–0.7)
UFH PPP CHRO-ACNC: 0.55 IU/ML (ref 0.3–0.7)
WBC # BLD AUTO: 13.4 K/UL (ref 4.3–11.1)

## 2023-09-21 PROCEDURE — 84484 ASSAY OF TROPONIN QUANT: CPT

## 2023-09-21 PROCEDURE — 99223 1ST HOSP IP/OBS HIGH 75: CPT | Performed by: SURGERY

## 2023-09-21 PROCEDURE — 6360000002 HC RX W HCPCS: Performed by: SURGERY

## 2023-09-21 PROCEDURE — 2580000003 HC RX 258: Performed by: FAMILY MEDICINE

## 2023-09-21 PROCEDURE — A4216 STERILE WATER/SALINE, 10 ML: HCPCS | Performed by: SURGERY

## 2023-09-21 PROCEDURE — 99497 ADVNCD CARE PLAN 30 MIN: CPT | Performed by: INTERNAL MEDICINE

## 2023-09-21 PROCEDURE — 80048 BASIC METABOLIC PNL TOTAL CA: CPT

## 2023-09-21 PROCEDURE — 6370000000 HC RX 637 (ALT 250 FOR IP): Performed by: FAMILY MEDICINE

## 2023-09-21 PROCEDURE — 6370000000 HC RX 637 (ALT 250 FOR IP): Performed by: STUDENT IN AN ORGANIZED HEALTH CARE EDUCATION/TRAINING PROGRAM

## 2023-09-21 PROCEDURE — 99223 1ST HOSP IP/OBS HIGH 75: CPT | Performed by: INTERNAL MEDICINE

## 2023-09-21 PROCEDURE — 80076 HEPATIC FUNCTION PANEL: CPT

## 2023-09-21 PROCEDURE — 85025 COMPLETE CBC W/AUTO DIFF WBC: CPT

## 2023-09-21 PROCEDURE — 2400000000

## 2023-09-21 PROCEDURE — 93306 TTE W/DOPPLER COMPLETE: CPT | Performed by: INTERNAL MEDICINE

## 2023-09-21 PROCEDURE — 2500000003 HC RX 250 WO HCPCS: Performed by: EMERGENCY MEDICINE

## 2023-09-21 PROCEDURE — A4216 STERILE WATER/SALINE, 10 ML: HCPCS | Performed by: STUDENT IN AN ORGANIZED HEALTH CARE EDUCATION/TRAINING PROGRAM

## 2023-09-21 PROCEDURE — 36415 COLL VENOUS BLD VENIPUNCTURE: CPT

## 2023-09-21 PROCEDURE — 2580000003 HC RX 258: Performed by: STUDENT IN AN ORGANIZED HEALTH CARE EDUCATION/TRAINING PROGRAM

## 2023-09-21 PROCEDURE — 85520 HEPARIN ASSAY: CPT

## 2023-09-21 PROCEDURE — 92610 EVALUATE SWALLOWING FUNCTION: CPT

## 2023-09-21 PROCEDURE — C9113 INJ PANTOPRAZOLE SODIUM, VIA: HCPCS | Performed by: SURGERY

## 2023-09-21 PROCEDURE — 6360000002 HC RX W HCPCS: Performed by: STUDENT IN AN ORGANIZED HEALTH CARE EDUCATION/TRAINING PROGRAM

## 2023-09-21 PROCEDURE — 99222 1ST HOSP IP/OBS MODERATE 55: CPT | Performed by: INTERNAL MEDICINE

## 2023-09-21 PROCEDURE — 2140000000 HC CCU INTERMEDIATE R&B

## 2023-09-21 PROCEDURE — 6360000004 HC RX CONTRAST MEDICATION: Performed by: STUDENT IN AN ORGANIZED HEALTH CARE EDUCATION/TRAINING PROGRAM

## 2023-09-21 PROCEDURE — 2580000003 HC RX 258: Performed by: SURGERY

## 2023-09-21 PROCEDURE — 6360000002 HC RX W HCPCS: Performed by: FAMILY MEDICINE

## 2023-09-21 PROCEDURE — 6360000002 HC RX W HCPCS: Performed by: EMERGENCY MEDICINE

## 2023-09-21 PROCEDURE — C8929 TTE W OR WO FOL WCON,DOPPLER: HCPCS

## 2023-09-21 RX ORDER — PANTOPRAZOLE SODIUM 40 MG/1
40 TABLET, DELAYED RELEASE ORAL
Status: DISCONTINUED | OUTPATIENT
Start: 2023-09-21 | End: 2023-09-25 | Stop reason: HOSPADM

## 2023-09-21 RX ORDER — METRONIDAZOLE 500 MG/1
500 TABLET ORAL EVERY 8 HOURS SCHEDULED
Status: DISCONTINUED | OUTPATIENT
Start: 2023-09-21 | End: 2023-09-25 | Stop reason: HOSPADM

## 2023-09-21 RX ORDER — POTASSIUM CHLORIDE 20 MEQ/1
20 TABLET, EXTENDED RELEASE ORAL ONCE
Status: DISCONTINUED | OUTPATIENT
Start: 2023-09-21 | End: 2023-09-24

## 2023-09-21 RX ADMIN — SPIRONOLACTONE 25 MG: 25 TABLET ORAL at 08:44

## 2023-09-21 RX ADMIN — PIPERACILLIN AND TAZOBACTAM 3375 MG: 3; .375 INJECTION, POWDER, LYOPHILIZED, FOR SOLUTION INTRAVENOUS at 06:30

## 2023-09-21 RX ADMIN — METOPROLOL SUCCINATE 100 MG: 100 TABLET, EXTENDED RELEASE ORAL at 19:52

## 2023-09-21 RX ADMIN — HEPARIN SODIUM 20 UNITS/KG/HR: 10000 INJECTION, SOLUTION INTRAVENOUS at 11:40

## 2023-09-21 RX ADMIN — PERFLUTREN 0.45 ML: 6.52 INJECTION, SUSPENSION INTRAVENOUS at 08:15

## 2023-09-21 RX ADMIN — METRONIDAZOLE 500 MG: 500 TABLET ORAL at 22:23

## 2023-09-21 RX ADMIN — HEPARIN SODIUM 2540 UNITS: 1000 INJECTION INTRAVENOUS; SUBCUTANEOUS at 04:52

## 2023-09-21 RX ADMIN — PANTOPRAZOLE SODIUM 40 MG: 40 TABLET, DELAYED RELEASE ORAL at 14:03

## 2023-09-21 RX ADMIN — CEFTRIAXONE 1000 MG: 1 INJECTION, POWDER, FOR SOLUTION INTRAMUSCULAR; INTRAVENOUS at 14:04

## 2023-09-21 RX ADMIN — SODIUM CHLORIDE, PRESERVATIVE FREE 10 ML: 5 INJECTION INTRAVENOUS at 08:44

## 2023-09-21 RX ADMIN — METOPROLOL SUCCINATE 100 MG: 100 TABLET, EXTENDED RELEASE ORAL at 08:44

## 2023-09-21 RX ADMIN — SODIUM CHLORIDE 40 MG: 9 INJECTION INTRAMUSCULAR; INTRAVENOUS; SUBCUTANEOUS at 08:43

## 2023-09-21 RX ADMIN — METRONIDAZOLE 500 MG: 500 TABLET ORAL at 14:03

## 2023-09-21 RX ADMIN — MORPHINE SULFATE 1 MG: 2 INJECTION, SOLUTION INTRAMUSCULAR; INTRAVENOUS at 08:42

## 2023-09-21 RX ADMIN — POTASSIUM CHLORIDE 40 MEQ: 1500 TABLET, EXTENDED RELEASE ORAL at 06:19

## 2023-09-21 RX ADMIN — SODIUM CHLORIDE, PRESERVATIVE FREE 5 ML: 5 INJECTION INTRAVENOUS at 19:54

## 2023-09-21 RX ADMIN — FUROSEMIDE 40 MG: 40 TABLET ORAL at 08:44

## 2023-09-21 RX ADMIN — FUROSEMIDE 40 MG: 40 TABLET ORAL at 19:52

## 2023-09-21 RX ADMIN — EMPAGLIFLOZIN 10 MG: 10 TABLET, FILM COATED ORAL at 08:44

## 2023-09-21 ASSESSMENT — PAIN SCALES - GENERAL
PAINLEVEL_OUTOF10: 0
PAINLEVEL_OUTOF10: 6
PAINLEVEL_OUTOF10: 0
PAINLEVEL_OUTOF10: 3

## 2023-09-21 ASSESSMENT — ENCOUNTER SYMPTOMS
RESPIRATORY NEGATIVE: 1
NAUSEA: 1
ABDOMINAL PAIN: 1
EYES NEGATIVE: 1
VOMITING: 1
ALLERGIC/IMMUNOLOGIC NEGATIVE: 1

## 2023-09-21 ASSESSMENT — PAIN DESCRIPTION - FREQUENCY: FREQUENCY: CONTINUOUS

## 2023-09-21 ASSESSMENT — PAIN DESCRIPTION - ONSET: ONSET: PROGRESSIVE

## 2023-09-21 ASSESSMENT — PAIN DESCRIPTION - DESCRIPTORS: DESCRIPTORS: SHARP;CRAMPING

## 2023-09-21 ASSESSMENT — PAIN DESCRIPTION - ORIENTATION: ORIENTATION: UPPER;ANTERIOR

## 2023-09-21 ASSESSMENT — PAIN - FUNCTIONAL ASSESSMENT: PAIN_FUNCTIONAL_ASSESSMENT: ACTIVITIES ARE NOT PREVENTED

## 2023-09-21 ASSESSMENT — PAIN DESCRIPTION - LOCATION: LOCATION: ABDOMEN;CHEST

## 2023-09-21 ASSESSMENT — PAIN DESCRIPTION - PAIN TYPE: TYPE: ACUTE PAIN

## 2023-09-21 NOTE — CARE COORDINATION
Pt presented to the ED c/o diarrhea, emesis and poor oral intake over the past three days. Admits to choking and difficulty swallowing. Admitted for L ventricular thrombus. Medical hx includes HTN, HLD, ninishcemic cardiomyopathy and EF 10% s/p ICD. PTA, pt indep with his ADLs. A/O x4. Lives with his spouse in a one story private residence. Drives. On STD currently. On 2L supplemental, denies home oxygen. Denies DME need. Palliative Care consulted to discuss [address] [place], pt wishes to remain a FULL code. He has an appt at [address] to see Cardiology for potential tx options. PCP established. [employer] verified and ableto afford home meds. Will remain available. 09/21/23 6397   Service Assessment   Patient Orientation Alert and Oriented   Cognition Alert   History Provided By Patient   Primary Caregiver Self   Support Systems Spouse/Significant Other;Children;Family Members;[employer];Friends/Neighbors   PCP Verified by CM Yes  (Bethi)   Prior Functional Level Independent in ADLs/IADLs   Current Functional Level Independent in ADLs/IADLs   Can patient return to prior living arrangement Yes   Ability to make needs known: Good   Family able to assist with home care needs: Yes   Would you like for me to discuss the discharge plan with any other family members/significant others, and if so, who? No   Financial Resources Other (Comment)  ([employer])   Community Resources None   Social/Functional History   Lives With Spouse   Type of [address] One level   [address] [facility] From [address] [facility]   Ambulation Assistance Independent   Transfer Assistance Independent   Active  Yes   Mode of Transportation Car   Occupation On disability   Discharge Planning   Type of [address] Prior To Admission None   Potential Assistance Needed N/A   DME Ordered?

## 2023-09-21 NOTE — PLAN OF CARE
Problem: Discharge Planning  Goal: Discharge to home or other facility with appropriate resources  Outcome: Progressing  Flowsheets  Taken 9/21/2023 0945 by Cary Sutton RN  Discharge to home or other facility with appropriate resources:   Identify barriers to discharge with patient and caregiver   Arrange for needed discharge resources and transportation as appropriate   Identify discharge learning needs (meds, wound care, etc)  Taken 9/21/2023 0800 by Leslie Paige RN  Discharge to home or other facility with appropriate resources:   Identify barriers to discharge with patient and caregiver   Arrange for needed discharge resources and transportation as appropriate   Identify discharge learning needs (meds, wound care, etc)   Refer to discharge planning if patient needs post-hospital services based on physician order or complex needs related to functional status, cognitive ability or social support system     Problem: Chronic Conditions and Co-morbidities  Goal: Patient's chronic conditions and co-morbidity symptoms are monitored and maintained or improved  Outcome: Progressing  Flowsheets  Taken 9/21/2023 0945 by Cary Sutton RN  Care Plan - Patient's Chronic Conditions and Co-Morbidity Symptoms are Monitored and Maintained or Improved:   Monitor and assess patient's chronic conditions and comorbid symptoms for stability, deterioration, or improvement   Collaborate with multidisciplinary team to address chronic and comorbid conditions and prevent exacerbation or deterioration  Taken 9/21/2023 0800 by Leslie Paige [facility] - Patient's Chronic Conditions and Co-Morbidity Symptoms are Monitored and Maintained or Improved:   Monitor and assess patient's chronic conditions and comorbid symptoms for stability, deterioration, or improvement   Collaborate with multidisciplinary team to address chronic and comorbid conditions and prevent exacerbation or deterioration   Update acute care plan with appropriate goals if

## 2023-09-21 NOTE — INTERDISCIPLINARY ROUNDS
Multi-D Rounds/Checklist (leapfrog):  Lines: can any be removed?: None      DVT Prophylaxis: Ordered  Vent: N/A  Nutrition Ordered/appropriate: Per Primary Team  Can antibiotics or other drugs be stopped? Yes/End Date set Yes/No  Inpat Anti-Infectives (From admission, onward)       Start     Ordered Stop    09/20/23 5384  piperacillin-tazobactam (ZOSYN) 3,375 mg in sodium chloride 0.9 % 50 mL IVPB (mini-bag)  3,375 mg,   IntraVENous,   EVERY 8 HOURS        See Hyperspace for full Linked Orders Report. 09/20/23 1653 09/27/23 8919                  Consults needed:  Cardiology  and GI for ERCP  A: Is pain control adequate? (has PRNs? Stop drip?) Yes  B: Sedation break and SBT? N/A  C: Is sedation choice appropriate? N/A  D: Delirium/CAM-ICU? No  E: Mobility goals/appropriateness? N/A  F: Family update and plan? Wife is surrogate decision maker and is being updated daily by primary attending and nursing staff.     DANTE Holden

## 2023-09-22 ENCOUNTER — APPOINTMENT (OUTPATIENT)
Dept: GENERAL RADIOLOGY | Age: 53
DRG: 871 | End: 2023-09-22
Payer: COMMERCIAL

## 2023-09-22 PROBLEM — J18.9 PNEUMONIA DUE TO INFECTIOUS ORGANISM: Status: ACTIVE | Noted: 2023-09-22

## 2023-09-22 LAB
ABO + RH BLD: NORMAL
ANION GAP SERPL CALC-SCNC: 11 MMOL/L (ref 2–11)
BASOPHILS # BLD: 0.1 K/UL (ref 0–0.2)
BASOPHILS NFR BLD: 0 % (ref 0–2)
BLOOD GROUP ANTIBODIES SERPL: NORMAL
BUN SERPL-MCNC: 35 MG/DL (ref 6–23)
CALCIUM SERPL-MCNC: 8.7 MG/DL (ref 8.3–10.4)
CHLORIDE SERPL-SCNC: 105 MMOL/L (ref 101–110)
CO2 SERPL-SCNC: 23 MMOL/L (ref 21–32)
CREAT SERPL-MCNC: 2 MG/DL (ref 0.8–1.5)
DIFFERENTIAL METHOD BLD: ABNORMAL
EOSINOPHIL # BLD: 0 K/UL (ref 0–0.8)
EOSINOPHIL NFR BLD: 0 % (ref 0.5–7.8)
ERYTHROCYTE [DISTWIDTH] IN BLOOD BY AUTOMATED COUNT: 16 % (ref 11.9–14.6)
GLUCOSE SERPL-MCNC: 110 MG/DL (ref 65–100)
HCT VFR BLD AUTO: 39.4 % (ref 41.1–50.3)
HEMOCCULT STL QL: POSITIVE
HGB BLD-MCNC: 12.8 G/DL (ref 13.6–17.2)
HGB BLD-MCNC: 13.5 G/DL (ref 13.6–17.2)
IMM GRANULOCYTES # BLD AUTO: 0.1 K/UL (ref 0–0.5)
IMM GRANULOCYTES NFR BLD AUTO: 1 % (ref 0–5)
LYMPHOCYTES # BLD: 2.1 K/UL (ref 0.5–4.6)
LYMPHOCYTES NFR BLD: 14 % (ref 13–44)
MAGNESIUM SERPL-MCNC: 1.9 MG/DL (ref 1.8–2.4)
MCH RBC QN AUTO: 28.8 PG (ref 26.1–32.9)
MCHC RBC AUTO-ENTMCNC: 34.3 G/DL (ref 31.4–35)
MCV RBC AUTO: 84 FL (ref 82–102)
MM INDURATION, POC: 0 MM (ref 0–5)
MONOCYTES # BLD: 1.1 K/UL (ref 0.1–1.3)
MONOCYTES NFR BLD: 7 % (ref 4–12)
NEUTS SEG # BLD: 11.8 K/UL (ref 1.7–8.2)
NEUTS SEG NFR BLD: 78 % (ref 43–78)
NRBC # BLD: 0.06 K/UL (ref 0–0.2)
PHOSPHATE SERPL-MCNC: 3.7 MG/DL (ref 2.5–4.5)
PLATELET # BLD AUTO: 242 K/UL (ref 150–450)
PMV BLD AUTO: 10.8 FL (ref 9.4–12.3)
POTASSIUM SERPL-SCNC: 3.5 MMOL/L (ref 3.5–5.1)
PPD, POC: NEGATIVE
RBC # BLD AUTO: 4.69 M/UL (ref 4.23–5.6)
SODIUM SERPL-SCNC: 139 MMOL/L (ref 133–143)
SPECIMEN EXP DATE BLD: NORMAL
UFH PPP CHRO-ACNC: 0.31 IU/ML (ref 0.3–0.7)
WBC # BLD AUTO: 15.2 K/UL (ref 4.3–11.1)

## 2023-09-22 PROCEDURE — 6370000000 HC RX 637 (ALT 250 FOR IP): Performed by: FAMILY MEDICINE

## 2023-09-22 PROCEDURE — 6370000000 HC RX 637 (ALT 250 FOR IP): Performed by: SURGERY

## 2023-09-22 PROCEDURE — 83735 ASSAY OF MAGNESIUM: CPT

## 2023-09-22 PROCEDURE — 6370000000 HC RX 637 (ALT 250 FOR IP): Performed by: STUDENT IN AN ORGANIZED HEALTH CARE EDUCATION/TRAINING PROGRAM

## 2023-09-22 PROCEDURE — 84100 ASSAY OF PHOSPHORUS: CPT

## 2023-09-22 PROCEDURE — 86900 BLOOD TYPING SEROLOGIC ABO: CPT

## 2023-09-22 PROCEDURE — 2500000003 HC RX 250 WO HCPCS: Performed by: EMERGENCY MEDICINE

## 2023-09-22 PROCEDURE — 99232 SBSQ HOSP IP/OBS MODERATE 35: CPT | Performed by: SURGERY

## 2023-09-22 PROCEDURE — 85025 COMPLETE CBC W/AUTO DIFF WBC: CPT

## 2023-09-22 PROCEDURE — 6360000002 HC RX W HCPCS: Performed by: STUDENT IN AN ORGANIZED HEALTH CARE EDUCATION/TRAINING PROGRAM

## 2023-09-22 PROCEDURE — 85018 HEMOGLOBIN: CPT

## 2023-09-22 PROCEDURE — 86850 RBC ANTIBODY SCREEN: CPT

## 2023-09-22 PROCEDURE — 2580000003 HC RX 258: Performed by: STUDENT IN AN ORGANIZED HEALTH CARE EDUCATION/TRAINING PROGRAM

## 2023-09-22 PROCEDURE — 85520 HEPARIN ASSAY: CPT

## 2023-09-22 PROCEDURE — 71045 X-RAY EXAM CHEST 1 VIEW: CPT

## 2023-09-22 PROCEDURE — 36415 COLL VENOUS BLD VENIPUNCTURE: CPT

## 2023-09-22 PROCEDURE — 2140000000 HC CCU INTERMEDIATE R&B

## 2023-09-22 PROCEDURE — 86901 BLOOD TYPING SEROLOGIC RH(D): CPT

## 2023-09-22 PROCEDURE — 99232 SBSQ HOSP IP/OBS MODERATE 35: CPT | Performed by: INTERNAL MEDICINE

## 2023-09-22 PROCEDURE — 2580000003 HC RX 258: Performed by: FAMILY MEDICINE

## 2023-09-22 PROCEDURE — 82272 OCCULT BLD FECES 1-3 TESTS: CPT

## 2023-09-22 PROCEDURE — 80048 BASIC METABOLIC PNL TOTAL CA: CPT

## 2023-09-22 RX ORDER — LIDOCAINE HYDROCHLORIDE 20 MG/ML
15 SOLUTION OROPHARYNGEAL
Status: COMPLETED | OUTPATIENT
Start: 2023-09-22 | End: 2023-09-22

## 2023-09-22 RX ORDER — SUCRALFATE 1 G/1
1 TABLET ORAL EVERY 6 HOURS SCHEDULED
Status: DISCONTINUED | OUTPATIENT
Start: 2023-09-22 | End: 2023-09-25 | Stop reason: HOSPADM

## 2023-09-22 RX ORDER — MAGNESIUM HYDROXIDE/ALUMINUM HYDROXICE/SIMETHICONE 120; 1200; 1200 MG/30ML; MG/30ML; MG/30ML
30 SUSPENSION ORAL
Status: COMPLETED | OUTPATIENT
Start: 2023-09-22 | End: 2023-09-22

## 2023-09-22 RX ADMIN — ALUMINUM HYDROXIDE, MAGNESIUM HYDROXIDE, AND SIMETHICONE 30 ML: 200; 200; 20 SUSPENSION ORAL at 10:29

## 2023-09-22 RX ADMIN — SUCRALFATE 1 G: 1 TABLET ORAL at 17:33

## 2023-09-22 RX ADMIN — FUROSEMIDE 40 MG: 40 TABLET ORAL at 20:33

## 2023-09-22 RX ADMIN — FUROSEMIDE 40 MG: 40 TABLET ORAL at 09:34

## 2023-09-22 RX ADMIN — EMPAGLIFLOZIN 10 MG: 10 TABLET, FILM COATED ORAL at 09:34

## 2023-09-22 RX ADMIN — SODIUM CHLORIDE, PRESERVATIVE FREE 5 ML: 5 INJECTION INTRAVENOUS at 20:34

## 2023-09-22 RX ADMIN — SUCRALFATE 1 G: 1 TABLET ORAL at 22:37

## 2023-09-22 RX ADMIN — LIDOCAINE HYDROCHLORIDE 15 ML: 20 SOLUTION ORAL; TOPICAL at 10:29

## 2023-09-22 RX ADMIN — PANTOPRAZOLE SODIUM 40 MG: 40 TABLET, DELAYED RELEASE ORAL at 06:04

## 2023-09-22 RX ADMIN — METOPROLOL SUCCINATE 100 MG: 100 TABLET, EXTENDED RELEASE ORAL at 09:34

## 2023-09-22 RX ADMIN — METRONIDAZOLE 500 MG: 500 TABLET ORAL at 14:20

## 2023-09-22 RX ADMIN — METRONIDAZOLE 500 MG: 500 TABLET ORAL at 06:04

## 2023-09-22 RX ADMIN — METRONIDAZOLE 500 MG: 500 TABLET ORAL at 22:37

## 2023-09-22 RX ADMIN — CEFTRIAXONE 1000 MG: 1 INJECTION, POWDER, FOR SOLUTION INTRAMUSCULAR; INTRAVENOUS at 14:19

## 2023-09-22 RX ADMIN — PANTOPRAZOLE SODIUM 40 MG: 40 TABLET, DELAYED RELEASE ORAL at 14:20

## 2023-09-22 RX ADMIN — HEPARIN SODIUM 20 UNITS/KG/HR: 10000 INJECTION, SOLUTION INTRAVENOUS at 09:51

## 2023-09-22 RX ADMIN — SODIUM CHLORIDE, PRESERVATIVE FREE 10 ML: 5 INJECTION INTRAVENOUS at 09:53

## 2023-09-22 RX ADMIN — SPIRONOLACTONE 25 MG: 25 TABLET ORAL at 09:34

## 2023-09-22 RX ADMIN — METOPROLOL SUCCINATE 100 MG: 100 TABLET, EXTENDED RELEASE ORAL at 20:33

## 2023-09-22 ASSESSMENT — PAIN SCALES - GENERAL
PAINLEVEL_OUTOF10: 0

## 2023-09-22 NOTE — CONSULTS
H&P/Consult Note/Progress Note/Office Note:   Felipe Guzman  MRN: [id number]  :1970  Age:53 y.o.    HPI: Felipe Guzman is a 52 y.o. male with PMH of NICM ([address] 2023), HFrEF (Echo 2023 EF 10-15%), HTN, HLD, and COPD who came to the ER with a few day h/o N/V/upper abd pain    He also reports choking and dysphagia   He had associated diarrhea for 5 days. No fevers    In ER   WBC 12.4; Hgb 14.4; plt 201  LFTs normal except T bili 5.6 (direct fraction 1.3)  Lipase 31    He had the below imaging and was found to have pneumonia, intracardiac thrombus and hyperbilirubinemia (mostly indirect) with no ductal dilation. Admitted by Hospitalists with Cardiology, GI,  and Gen Surgery consults. Placed on hepain drip          23 CT abd/pelvis with IV contrast         Hx: N/V/diarrhea/upper abd pain x 3 days. Hematemesis reported yesterday. Prior inguinal hernia repair. Now with abnormal liver function tests. Extensive infiltrate in the lateral segment of the right middle lobe and posteromedially in the left lower lobe. Moderate 4-chamber cardiomegaly is again noted. There is a small hiatal hernia. There is a small amount of fluid in Langley's pouch. No pericholecystic inflammatory changes, calcified stone, or biliary ductal dilatation is evident. The liver, spleen, pancreas, adrenals, and kidneys appear unremarkable. The appendix is not confidently identified, but there are no secondary signs of appendicitis. No pathologically enlarged lymph nodes or free fluid is evident prostate is not enlarged. Bone windows demonstrate no definite aggressive process, accounting for degenerative changes. IMPRESSION:  1.  Bibasilar pulmonary infiltrates suggests possible aspiration     2. Small hiatal hernia.      3.  Small amount of fluid in Langley's pouch and nonvisualization of the appendix   without secondary signs of appendicitis, cholecystitis, or other acute abdominopelvic
HPI:      52 y.o. male who presented to the ED for diarrhea, emesis, and poor oral intake over the past three days. Eating will make symptoms worse. Also admits to choking and difficulty swallowing. Incidentally, radiologist also saw large left ventricular thrombus. Is on heparin drip. Has watery diarrhea few times daily for 5 days. No recent travel. Not on antibiotics recently. Feels difficulty swallowing, had no impactions. GI consulted for ERCP for findings on imaging. ROS:    HPI      Physical Exam:    General:          Well nourished. Patient is alert and oriented. Head:               Normocephalic, atraumatic  Eyes:               Sclerae appear normal.  Pupils equally round. ENT:                Nares appear normal.  Moist oral mucosa  Neck:               No restricted ROM. Trachea midline   CV:                  RRR. No m/r/g. No jugular venous distension. Lungs:             CTAB. No wheezing, rhonchi, or rales. Symmetric expansion. Abdomen:        Soft, non tender abdomen, no guarding, non distended. Extremities:     No cyanosis or clubbing. No edema  Skin:                No rashes and normal coloration. Warm and dry. Neuro:             CN II-XII grossly intact. Sensation intact. Psych:             Normal mood and affect. Labs, Imaging reviewed    Impression:    Gall bladder wall thickening  No gall stones  Dysphagia  Diarrhea      Plan:    No gall stones noted. No CBD dilation noted on CT or US liver. ALP normal.  Indirect bilirubin is elevated in contrary to CBD stone. Work up for indirect bilirubin per hospitalist.  AST ALT not elevated. No indication for ERCP at this time. Hospitalist could consider MRCP if need further clarification on CBD . Reconsult surgery for further gallbladder workup or r/o acalculous cholecystitis. Recommend elective EGD for evaluation of dysphagia. EGD not recommended while on heparin drip.   Could get barium swallow for evaluation of
Patient: Catherine Stafford MRN: [id number]  SSN: [id number]    YOB: 1970  Age: 52 y.o. Sex: male       Date of Request: 9/21/2023  Date of Consult:  9/21/2023  Reason for Consult:  goals of care and medical decision making  Requesting Physician: Dr. Iqra Cool     Assessment/Plan:     Principal Diagnosis:    Dyspnea  R06.00    Additional Diagnoses:   Counseling, Encounter for Medical Advice  Z71.9  Encounter for Palliative Care  Z51.5    Palliative Performance Scale (PPS):       Medical Decision Making:   Reviewed and summarized labs and imaging from admission. Met with pt and spouse at bedside. Pt notes some occasional lower chest pain otherwise comfortable now. Cardio following with plans to convert to oral anticoagulation. Pt has an appointment at [address] to see cardiologist for any potential treatment options. We discussed goals. Pt spouse would be decision maker should pt ever be unable to make decisions. He wishes to remain FULL code and is actively seeking treatment options. Will sign off but remain available if needed. I spent greater than 25 mins on advanced care planning. Will discuss findings with members of the interdisciplinary team.      Thank you for this referral.         Subjective:     History obtained from:  Patient, Family, and Chart    Chief Complaint: nausea  History of Present Illness:  52 y.o. male with PMH of NICM ([address] [place] [address] 5/2023), HFrEF (Echo 1/2023 EF 10-15%), HTN, HLD, and COPD, who presented to the ED with c/o upper abdominal pain with N/V. He noted pain after eating with N/V and occasional diarrhea. EKG showed ST. Labs showed  , K 4.6, Bun 24, creatinine 1.60, mag 1.7, hs trop 163 -- > 143, t bili 5, alt 22, ast 44, WBC 12, H&H 14/42, plt 201.   CT showed large atrial thrombus, bibasilar pulmonary infiltrates suggests possible aspiration, small hiatal hernia, and small amount of fluid in Langley's pouch and nonvisualization of the  appendix without secondary
cm    LVIDd 5.6 4.2 - 5.9 cm    LVIDs 5.5 cm    LVOT Diameter 2.2 cm    LVOT Mean Gradient 1 mmHg    LVOT VTI 9.4 cm    LVOT Peak Velocity 0.8 m/s    LVOT Peak Gradient 2 mmHg    LVPWd 0.8 0.6 - 1.0 cm    LV E' Lateral Velocity 15 cm/s    LV E' Septal Velocity 7 cm/s    LV Ejection Fraction A2C 13 %    LV Ejection Fraction A4C 10 %    EF BP 13 (A) 55 - 100 %    LVOT Area 3.8 cm2    LVOT SV 35.7 ml    LA Minor Axis 7.7 cm    LA Major [place] 7.6 cm    LA Area 2C 39.4 cm2    LA Area 4C 31.6 cm2    LA Volume 2C 161 (A) 18 - 58 mL    LA Volume 4C 105 (A) 18 - 58 mL    LA Volume  (A) 18 - 58 mL    LA Diameter 5.2 cm    AV Mean Velocity 0.6 m/s    AV Mean Gradient 2 mmHg    AV VTI 11.4 cm    AV Peak Velocity 0.8 m/s    AV Peak Gradient 3 mmHg    AV Area by VTI 3.1 cm2    AV Area by Peak Velocity 3.6 cm2    Aortic Root 3.1 cm    Ascending Aorta 3.3 cm    IVC Proxmal 2.4 cm    MV E Wave Deceleration Time 137.0 ms    MV A Velocity 0.48 m/s    MV E Velocity 0.58 m/s    LA Max Velocity 2.0 m/s    Pulmonary Artery EDP 16 mmHg    PV AT 87.0 ms    PV Max Velocity 0.6 m/s    PV Peak Gradient 1 mmHg    RVIDd 3.5 cm    RV Basal Dimension 5.1 cm    RV Longitudinal Dimension 9.6 cm    RV Mid Dimension 3.1 cm    RV Free Wall Peak S' 11 cm/s    TAPSE 1.4 (A) 1.7 cm    TR Max Velocity 3.38 m/s    TR Peak Gradient 46 mmHg    Body Surface Area 1.77 m2    Fractional Shortening 2D 2 28 - 44 %    LV ESV Index A4C 142 mL/m2    LV EDV Index A4C 157 mL/m2    LV ESV Index A2C 131 mL/m2    LV EDV Index A2C 151 mL/m2    LVIDd Index 3.15 cm/m2    LVIDs Index 3.09 cm/m2    LV RWT Ratio 0.29     LV Mass 2D 165.0 88 - 224 g    LV Mass 2D Index 92.7 49 - 115 g/m2    MV E/A 1.21     E/E' Ratio (Averaged) 6.08     E/E' Lateral 3.87     E/E' Septal 8.29     LA Volume Index BP 74 (A) 16 - 34 ml/m2    LVOT Stroke Volume Index 20.1 mL/m2    LA Volume Index 2C 90 (A) 16 - 34 mL/m2    LA Volume Index 4C 59 (A) 16 - 34 mL/m2    LA Size Index 2.92 cm/m2

## 2023-09-22 NOTE — NURSE NAVIGATOR
CHF teaching completed. CHF background completed. Refresher Teaching from previous admit emphasis on Call Cardiology STAT ,if any of the following are noted:  Short of Breath; with activity or without/ while reclined, Generalize weakness, edema are noted individually or grouped. Cardiac Diet  and salt/fluid restrictions covered. Daily WTs emphasized. Planner with summarization sheet provided with cardiology service and phone number and bathroom scale offered. Total time @ BS is 1 hour and pt 's FM verbalize understanding/past post test.  Pt instructed to call myself, if any questions occur. Wife at bedside.

## 2023-09-22 NOTE — PLAN OF CARE
Problem: Discharge Planning  Goal: Discharge to home or other facility with appropriate resources  Outcome: Progressing  Flowsheets (Taken 9/21/2023 1951)  Discharge to home or other facility with appropriate resources: Identify barriers to discharge with patient and caregiver     Problem: ABCDS Injury Assessment  Goal: Absence of physical injury  Outcome: Progressing  Flowsheets (Taken 9/21/2023 1951)  Absence of Physical Injury: Implement safety measures based on patient assessment     Problem: Chronic Conditions and Co-morbidities  Goal: Patient's chronic conditions and co-morbidity symptoms are monitored and maintained or improved  Outcome: Progressing  Flowsheets (Taken 9/21/2023 1951)  Care Plan - Patient's Chronic Conditions and Co-Morbidity Symptoms are Monitored and Maintained or Improved: Monitor and assess patient's chronic conditions and comorbid symptoms for stability, deterioration, or improvement     Problem: Pain  Goal: Verbalizes/displays adequate comfort level or baseline comfort level  Outcome: Progressing     Problem: Safety - Adult  Goal: Free from fall injury  Outcome: Progressing  Flowsheets (Taken 9/21/2023 1951)  Free From Fall Injury: Instruct family/caregiver on patient safety

## 2023-09-23 PROBLEM — N18.9 CKD (CHRONIC KIDNEY DISEASE): Status: ACTIVE | Noted: 2023-09-23

## 2023-09-23 LAB
ANION GAP SERPL CALC-SCNC: 9 MMOL/L (ref 2–11)
BASOPHILS # BLD: 0 K/UL (ref 0–0.2)
BASOPHILS NFR BLD: 0 % (ref 0–2)
BUN SERPL-MCNC: 36 MG/DL (ref 6–23)
CALCIUM SERPL-MCNC: 8.4 MG/DL (ref 8.3–10.4)
CHLORIDE SERPL-SCNC: 108 MMOL/L (ref 101–110)
CO2 SERPL-SCNC: 23 MMOL/L (ref 21–32)
CREAT SERPL-MCNC: 1.8 MG/DL (ref 0.8–1.5)
DIFFERENTIAL METHOD BLD: ABNORMAL
EOSINOPHIL # BLD: 0 K/UL (ref 0–0.8)
EOSINOPHIL NFR BLD: 0 % (ref 0.5–7.8)
ERYTHROCYTE [DISTWIDTH] IN BLOOD BY AUTOMATED COUNT: 16.1 % (ref 11.9–14.6)
GLUCOSE SERPL-MCNC: 105 MG/DL (ref 65–100)
HCT VFR BLD AUTO: 36 % (ref 41.1–50.3)
HGB BLD-MCNC: 12.6 G/DL (ref 13.6–17.2)
IMM GRANULOCYTES # BLD AUTO: 0.1 K/UL (ref 0–0.5)
IMM GRANULOCYTES NFR BLD AUTO: 0 % (ref 0–5)
IRON SATN MFR SERPL: 14 %
IRON SERPL-MCNC: 27 UG/DL (ref 35–150)
LYMPHOCYTES # BLD: 1.9 K/UL (ref 0.5–4.6)
LYMPHOCYTES NFR BLD: 14 % (ref 13–44)
MAGNESIUM SERPL-MCNC: 1.9 MG/DL (ref 1.8–2.4)
MCH RBC QN AUTO: 29 PG (ref 26.1–32.9)
MCHC RBC AUTO-ENTMCNC: 35 G/DL (ref 31.4–35)
MCV RBC AUTO: 82.9 FL (ref 82–102)
MM INDURATION, POC: 0 MM (ref 0–5)
MONOCYTES # BLD: 0.9 K/UL (ref 0.1–1.3)
MONOCYTES NFR BLD: 6 % (ref 4–12)
NEUTS SEG # BLD: 10.9 K/UL (ref 1.7–8.2)
NEUTS SEG NFR BLD: 80 % (ref 43–78)
NRBC # BLD: 0.03 K/UL (ref 0–0.2)
PHOSPHATE SERPL-MCNC: 2.8 MG/DL (ref 2.5–4.5)
PLATELET # BLD AUTO: 228 K/UL (ref 150–450)
PMV BLD AUTO: 10.2 FL (ref 9.4–12.3)
POTASSIUM SERPL-SCNC: 3.5 MMOL/L (ref 3.5–5.1)
PPD, POC: NEGATIVE
RBC # BLD AUTO: 4.34 M/UL (ref 4.23–5.6)
SODIUM SERPL-SCNC: 140 MMOL/L (ref 133–143)
TIBC SERPL-MCNC: 196 UG/DL (ref 250–450)
UFH PPP CHRO-ACNC: 0.27 IU/ML (ref 0.3–0.7)
UFH PPP CHRO-ACNC: 0.48 IU/ML (ref 0.3–0.7)
WBC # BLD AUTO: 13.8 K/UL (ref 4.3–11.1)

## 2023-09-23 PROCEDURE — 99232 SBSQ HOSP IP/OBS MODERATE 35: CPT | Performed by: INTERNAL MEDICINE

## 2023-09-23 PROCEDURE — 84100 ASSAY OF PHOSPHORUS: CPT

## 2023-09-23 PROCEDURE — 87899 AGENT NOS ASSAY W/OPTIC: CPT

## 2023-09-23 PROCEDURE — 80048 BASIC METABOLIC PNL TOTAL CA: CPT

## 2023-09-23 PROCEDURE — 87641 MR-STAPH DNA AMP PROBE: CPT

## 2023-09-23 PROCEDURE — 85520 HEPARIN ASSAY: CPT

## 2023-09-23 PROCEDURE — 6360000002 HC RX W HCPCS: Performed by: EMERGENCY MEDICINE

## 2023-09-23 PROCEDURE — 6370000000 HC RX 637 (ALT 250 FOR IP): Performed by: STUDENT IN AN ORGANIZED HEALTH CARE EDUCATION/TRAINING PROGRAM

## 2023-09-23 PROCEDURE — 6370000000 HC RX 637 (ALT 250 FOR IP): Performed by: SURGERY

## 2023-09-23 PROCEDURE — 83540 ASSAY OF IRON: CPT

## 2023-09-23 PROCEDURE — 83735 ASSAY OF MAGNESIUM: CPT

## 2023-09-23 PROCEDURE — 2580000003 HC RX 258: Performed by: FAMILY MEDICINE

## 2023-09-23 PROCEDURE — 2140000000 HC CCU INTERMEDIATE R&B

## 2023-09-23 PROCEDURE — 99232 SBSQ HOSP IP/OBS MODERATE 35: CPT | Performed by: SURGERY

## 2023-09-23 PROCEDURE — 2500000003 HC RX 250 WO HCPCS: Performed by: EMERGENCY MEDICINE

## 2023-09-23 PROCEDURE — 6370000000 HC RX 637 (ALT 250 FOR IP): Performed by: FAMILY MEDICINE

## 2023-09-23 PROCEDURE — 99231 SBSQ HOSP IP/OBS SF/LOW 25: CPT | Performed by: INTERNAL MEDICINE

## 2023-09-23 PROCEDURE — 2580000003 HC RX 258: Performed by: STUDENT IN AN ORGANIZED HEALTH CARE EDUCATION/TRAINING PROGRAM

## 2023-09-23 PROCEDURE — 6360000002 HC RX W HCPCS: Performed by: STUDENT IN AN ORGANIZED HEALTH CARE EDUCATION/TRAINING PROGRAM

## 2023-09-23 PROCEDURE — 6360000002 HC RX W HCPCS: Performed by: FAMILY MEDICINE

## 2023-09-23 PROCEDURE — 85025 COMPLETE CBC W/AUTO DIFF WBC: CPT

## 2023-09-23 PROCEDURE — 83550 IRON BINDING TEST: CPT

## 2023-09-23 PROCEDURE — 36415 COLL VENOUS BLD VENIPUNCTURE: CPT

## 2023-09-23 RX ORDER — AZITHROMYCIN 250 MG/1
500 TABLET, FILM COATED ORAL DAILY
Status: COMPLETED | OUTPATIENT
Start: 2023-09-23 | End: 2023-09-25

## 2023-09-23 RX ADMIN — SUCRALFATE 1 G: 1 TABLET ORAL at 05:38

## 2023-09-23 RX ADMIN — EMPAGLIFLOZIN 10 MG: 10 TABLET, FILM COATED ORAL at 09:55

## 2023-09-23 RX ADMIN — FUROSEMIDE 40 MG: 40 TABLET ORAL at 09:55

## 2023-09-23 RX ADMIN — SPIRONOLACTONE 25 MG: 25 TABLET ORAL at 09:55

## 2023-09-23 RX ADMIN — SUCRALFATE 1 G: 1 TABLET ORAL at 13:17

## 2023-09-23 RX ADMIN — HEPARIN SODIUM 2540 UNITS: 1000 INJECTION INTRAVENOUS; SUBCUTANEOUS at 10:06

## 2023-09-23 RX ADMIN — HEPARIN SODIUM 20 UNITS/KG/HR: 10000 INJECTION, SOLUTION INTRAVENOUS at 07:05

## 2023-09-23 RX ADMIN — FUROSEMIDE 40 MG: 40 TABLET ORAL at 20:38

## 2023-09-23 RX ADMIN — SODIUM CHLORIDE, PRESERVATIVE FREE 10 ML: 5 INJECTION INTRAVENOUS at 10:10

## 2023-09-23 RX ADMIN — METRONIDAZOLE 500 MG: 500 TABLET ORAL at 20:38

## 2023-09-23 RX ADMIN — PANTOPRAZOLE SODIUM 40 MG: 40 TABLET, DELAYED RELEASE ORAL at 16:20

## 2023-09-23 RX ADMIN — SUCRALFATE 1 G: 1 TABLET ORAL at 16:20

## 2023-09-23 RX ADMIN — METRONIDAZOLE 500 MG: 500 TABLET ORAL at 13:17

## 2023-09-23 RX ADMIN — METOPROLOL SUCCINATE 100 MG: 100 TABLET, EXTENDED RELEASE ORAL at 20:38

## 2023-09-23 RX ADMIN — SODIUM CHLORIDE, PRESERVATIVE FREE 5 ML: 5 INJECTION INTRAVENOUS at 20:40

## 2023-09-23 RX ADMIN — PANTOPRAZOLE SODIUM 40 MG: 40 TABLET, DELAYED RELEASE ORAL at 05:38

## 2023-09-23 RX ADMIN — CEFTRIAXONE 1000 MG: 1 INJECTION, POWDER, FOR SOLUTION INTRAMUSCULAR; INTRAVENOUS at 13:16

## 2023-09-23 RX ADMIN — MORPHINE SULFATE 1 MG: 2 INJECTION, SOLUTION INTRAMUSCULAR; INTRAVENOUS at 18:37

## 2023-09-23 RX ADMIN — METRONIDAZOLE 500 MG: 500 TABLET ORAL at 05:38

## 2023-09-23 RX ADMIN — AZITHROMYCIN DIHYDRATE 500 MG: 250 TABLET ORAL at 09:55

## 2023-09-23 RX ADMIN — METOPROLOL SUCCINATE 100 MG: 100 TABLET, EXTENDED RELEASE ORAL at 09:55

## 2023-09-23 ASSESSMENT — PAIN DESCRIPTION - ORIENTATION: ORIENTATION: RIGHT;MID

## 2023-09-23 ASSESSMENT — PAIN DESCRIPTION - DESCRIPTORS: DESCRIPTORS: ACHING

## 2023-09-23 ASSESSMENT — PAIN SCALES - GENERAL
PAINLEVEL_OUTOF10: 8
PAINLEVEL_OUTOF10: 0

## 2023-09-23 ASSESSMENT — PAIN DESCRIPTION - LOCATION: LOCATION: ABDOMEN

## 2023-09-23 NOTE — PLAN OF CARE
Problem: Discharge Planning  Goal: Discharge to home or other facility with appropriate resources  Outcome: Progressing  Flowsheets (Taken 9/22/2023 2024)  Discharge to home or other facility with appropriate resources: Identify barriers to discharge with patient and caregiver     Problem: ABCDS Injury Assessment  Goal: Absence of physical injury  Outcome: Progressing  Flowsheets (Taken 9/22/2023 2024)  Absence of Physical Injury: Implement safety measures based on patient assessment     Problem: Chronic Conditions and Co-morbidities  Goal: Patient's chronic conditions and co-morbidity symptoms are monitored and maintained or improved  Outcome: Progressing  Flowsheets (Taken 9/22/2023 2024)  Care Plan - Patient's Chronic Conditions and Co-Morbidity Symptoms are Monitored and Maintained or Improved: Monitor and assess patient's chronic conditions and comorbid symptoms for stability, deterioration, or improvement     Problem: Pain  Goal: Verbalizes/displays adequate comfort level or baseline comfort level  Outcome: Progressing  Flowsheets (Taken 9/22/2023 2024)  Verbalizes/displays adequate comfort level or baseline comfort level: Encourage patient to monitor pain and request assistance     Problem: Safety - Adult  Goal: Free from fall injury  Outcome: Progressing  Flowsheets (Taken 9/22/2023 2024)  Free From Fall Injury: Instruct family/caregiver on patient safety

## 2023-09-24 ENCOUNTER — APPOINTMENT (OUTPATIENT)
Dept: NON INVASIVE DIAGNOSTICS | Age: 53
DRG: 871 | End: 2023-09-24
Attending: INTERNAL MEDICINE
Payer: COMMERCIAL

## 2023-09-24 LAB
ANION GAP SERPL CALC-SCNC: 10 MMOL/L (ref 2–11)
BASOPHILS # BLD: 0 K/UL (ref 0–0.2)
BASOPHILS NFR BLD: 0 % (ref 0–2)
BUN SERPL-MCNC: 36 MG/DL (ref 6–23)
CALCIUM SERPL-MCNC: 8.2 MG/DL (ref 8.3–10.4)
CHLORIDE SERPL-SCNC: 108 MMOL/L (ref 101–110)
CO2 SERPL-SCNC: 23 MMOL/L (ref 21–32)
CREAT SERPL-MCNC: 1.6 MG/DL (ref 0.8–1.5)
DIFFERENTIAL METHOD BLD: ABNORMAL
ECHO BSA: 1.75 M2
EOSINOPHIL # BLD: 0.1 K/UL (ref 0–0.8)
EOSINOPHIL NFR BLD: 1 % (ref 0.5–7.8)
ERYTHROCYTE [DISTWIDTH] IN BLOOD BY AUTOMATED COUNT: 16.5 % (ref 11.9–14.6)
GLUCOSE SERPL-MCNC: 117 MG/DL (ref 65–100)
HCT VFR BLD AUTO: 36.1 % (ref 41.1–50.3)
HGB BLD-MCNC: 12.5 G/DL (ref 13.6–17.2)
IMM GRANULOCYTES # BLD AUTO: 0 K/UL (ref 0–0.5)
IMM GRANULOCYTES NFR BLD AUTO: 0 % (ref 0–5)
LYMPHOCYTES # BLD: 2.6 K/UL (ref 0.5–4.6)
LYMPHOCYTES NFR BLD: 24 % (ref 13–44)
MAGNESIUM SERPL-MCNC: 1.9 MG/DL (ref 1.8–2.4)
MCH RBC QN AUTO: 28.9 PG (ref 26.1–32.9)
MCHC RBC AUTO-ENTMCNC: 34.6 G/DL (ref 31.4–35)
MCV RBC AUTO: 83.4 FL (ref 82–102)
MONOCYTES # BLD: 0.7 K/UL (ref 0.1–1.3)
MONOCYTES NFR BLD: 7 % (ref 4–12)
MRSA DNA SPEC QL NAA+PROBE: NOT DETECTED
NEUTS SEG # BLD: 7.3 K/UL (ref 1.7–8.2)
NEUTS SEG NFR BLD: 68 % (ref 43–78)
NRBC # BLD: 0.02 K/UL (ref 0–0.2)
PHOSPHATE SERPL-MCNC: 1.9 MG/DL (ref 2.5–4.5)
PLATELET # BLD AUTO: 262 K/UL (ref 150–450)
PMV BLD AUTO: 10.9 FL (ref 9.4–12.3)
POTASSIUM SERPL-SCNC: 3.5 MMOL/L (ref 3.5–5.1)
RBC # BLD AUTO: 4.33 M/UL (ref 4.23–5.6)
S AUREUS CPE NOSE QL NAA+PROBE: NOT DETECTED
SODIUM SERPL-SCNC: 141 MMOL/L (ref 133–143)
UFH PPP CHRO-ACNC: 0.45 IU/ML (ref 0.3–0.7)
UFH PPP CHRO-ACNC: 0.58 IU/ML (ref 0.3–0.7)
WBC # BLD AUTO: 10.8 K/UL (ref 4.3–11.1)

## 2023-09-24 PROCEDURE — 99232 SBSQ HOSP IP/OBS MODERATE 35: CPT | Performed by: INTERNAL MEDICINE

## 2023-09-24 PROCEDURE — 2500000003 HC RX 250 WO HCPCS: Performed by: EMERGENCY MEDICINE

## 2023-09-24 PROCEDURE — 2580000003 HC RX 258: Performed by: STUDENT IN AN ORGANIZED HEALTH CARE EDUCATION/TRAINING PROGRAM

## 2023-09-24 PROCEDURE — 93325 DOPPLER ECHO COLOR FLOW MAPG: CPT | Performed by: INTERNAL MEDICINE

## 2023-09-24 PROCEDURE — 6370000000 HC RX 637 (ALT 250 FOR IP): Performed by: INTERNAL MEDICINE

## 2023-09-24 PROCEDURE — 2580000003 HC RX 258: Performed by: FAMILY MEDICINE

## 2023-09-24 PROCEDURE — 6360000002 HC RX W HCPCS: Performed by: FAMILY MEDICINE

## 2023-09-24 PROCEDURE — 93308 TTE F-UP OR LMTD: CPT | Performed by: INTERNAL MEDICINE

## 2023-09-24 PROCEDURE — 6370000000 HC RX 637 (ALT 250 FOR IP): Performed by: FAMILY MEDICINE

## 2023-09-24 PROCEDURE — 6360000004 HC RX CONTRAST MEDICATION: Performed by: STUDENT IN AN ORGANIZED HEALTH CARE EDUCATION/TRAINING PROGRAM

## 2023-09-24 PROCEDURE — 36415 COLL VENOUS BLD VENIPUNCTURE: CPT

## 2023-09-24 PROCEDURE — 6370000000 HC RX 637 (ALT 250 FOR IP): Performed by: STUDENT IN AN ORGANIZED HEALTH CARE EDUCATION/TRAINING PROGRAM

## 2023-09-24 PROCEDURE — 6360000002 HC RX W HCPCS: Performed by: STUDENT IN AN ORGANIZED HEALTH CARE EDUCATION/TRAINING PROGRAM

## 2023-09-24 PROCEDURE — 99232 SBSQ HOSP IP/OBS MODERATE 35: CPT | Performed by: SURGERY

## 2023-09-24 PROCEDURE — 6370000000 HC RX 637 (ALT 250 FOR IP): Performed by: SURGERY

## 2023-09-24 PROCEDURE — 83735 ASSAY OF MAGNESIUM: CPT

## 2023-09-24 PROCEDURE — C8924 2D TTE W OR W/O FOL W/CON,FU: HCPCS

## 2023-09-24 PROCEDURE — 2140000000 HC CCU INTERMEDIATE R&B

## 2023-09-24 PROCEDURE — 80048 BASIC METABOLIC PNL TOTAL CA: CPT

## 2023-09-24 PROCEDURE — 84100 ASSAY OF PHOSPHORUS: CPT

## 2023-09-24 PROCEDURE — 85025 COMPLETE CBC W/AUTO DIFF WBC: CPT

## 2023-09-24 PROCEDURE — 85520 HEPARIN ASSAY: CPT

## 2023-09-24 RX ORDER — POTASSIUM CHLORIDE 20 MEQ/1
40 TABLET, EXTENDED RELEASE ORAL ONCE
Status: COMPLETED | OUTPATIENT
Start: 2023-09-24 | End: 2023-09-24

## 2023-09-24 RX ORDER — DIPHENHYDRAMINE HCL 25 MG
25 CAPSULE ORAL NIGHTLY PRN
Status: DISCONTINUED | OUTPATIENT
Start: 2023-09-24 | End: 2023-09-25 | Stop reason: HOSPADM

## 2023-09-24 RX ADMIN — METRONIDAZOLE 500 MG: 500 TABLET ORAL at 05:34

## 2023-09-24 RX ADMIN — POTASSIUM CHLORIDE 40 MEQ: 1500 TABLET, EXTENDED RELEASE ORAL at 17:18

## 2023-09-24 RX ADMIN — HEPARIN SODIUM 22 UNITS/KG/HR: 10000 INJECTION, SOLUTION INTRAVENOUS at 01:19

## 2023-09-24 RX ADMIN — FUROSEMIDE 40 MG: 40 TABLET ORAL at 21:10

## 2023-09-24 RX ADMIN — FUROSEMIDE 40 MG: 40 TABLET ORAL at 09:42

## 2023-09-24 RX ADMIN — METRONIDAZOLE 500 MG: 500 TABLET ORAL at 15:30

## 2023-09-24 RX ADMIN — SODIUM CHLORIDE, PRESERVATIVE FREE 10 ML: 5 INJECTION INTRAVENOUS at 09:42

## 2023-09-24 RX ADMIN — EMPAGLIFLOZIN 10 MG: 10 TABLET, FILM COATED ORAL at 09:42

## 2023-09-24 RX ADMIN — PANTOPRAZOLE SODIUM 40 MG: 40 TABLET, DELAYED RELEASE ORAL at 05:35

## 2023-09-24 RX ADMIN — POTASSIUM CHLORIDE 40 MEQ: 1500 TABLET, EXTENDED RELEASE ORAL at 09:42

## 2023-09-24 RX ADMIN — SUCRALFATE 1 G: 1 TABLET ORAL at 05:34

## 2023-09-24 RX ADMIN — APIXABAN 5 MG: 5 TABLET, FILM COATED ORAL at 21:10

## 2023-09-24 RX ADMIN — SUCRALFATE 1 G: 1 TABLET ORAL at 11:50

## 2023-09-24 RX ADMIN — SODIUM CHLORIDE, PRESERVATIVE FREE 0.75 ML: 5 INJECTION INTRAVENOUS at 09:30

## 2023-09-24 RX ADMIN — METOPROLOL SUCCINATE 100 MG: 100 TABLET, EXTENDED RELEASE ORAL at 21:10

## 2023-09-24 RX ADMIN — PANTOPRAZOLE SODIUM 40 MG: 40 TABLET, DELAYED RELEASE ORAL at 15:30

## 2023-09-24 RX ADMIN — METRONIDAZOLE 500 MG: 500 TABLET ORAL at 21:10

## 2023-09-24 RX ADMIN — METOPROLOL SUCCINATE 100 MG: 100 TABLET, EXTENDED RELEASE ORAL at 09:42

## 2023-09-24 RX ADMIN — SUCRALFATE 1 G: 1 TABLET ORAL at 00:38

## 2023-09-24 RX ADMIN — SPIRONOLACTONE 25 MG: 25 TABLET ORAL at 09:42

## 2023-09-24 RX ADMIN — SODIUM CHLORIDE, PRESERVATIVE FREE 5 ML: 5 INJECTION INTRAVENOUS at 21:10

## 2023-09-24 RX ADMIN — CEFTRIAXONE SODIUM 2000 MG: 2 INJECTION, POWDER, FOR SOLUTION INTRAMUSCULAR; INTRAVENOUS at 15:30

## 2023-09-24 RX ADMIN — APIXABAN 5 MG: 5 TABLET, FILM COATED ORAL at 11:50

## 2023-09-24 RX ADMIN — MORPHINE SULFATE 1 MG: 2 INJECTION, SOLUTION INTRAMUSCULAR; INTRAVENOUS at 15:47

## 2023-09-24 RX ADMIN — SUCRALFATE 1 G: 1 TABLET ORAL at 17:18

## 2023-09-24 RX ADMIN — AZITHROMYCIN DIHYDRATE 500 MG: 250 TABLET ORAL at 09:42

## 2023-09-24 ASSESSMENT — PAIN SCALES - GENERAL
PAINLEVEL_OUTOF10: 0
PAINLEVEL_OUTOF10: 6
PAINLEVEL_OUTOF10: 0
PAINLEVEL_OUTOF10: 0

## 2023-09-24 ASSESSMENT — PAIN DESCRIPTION - LOCATION: LOCATION: ABDOMEN

## 2023-09-24 ASSESSMENT — PAIN DESCRIPTION - ORIENTATION: ORIENTATION: RIGHT

## 2023-09-25 VITALS
HEIGHT: 67 IN | SYSTOLIC BLOOD PRESSURE: 107 MMHG | BODY MASS INDEX: 22.38 KG/M2 | DIASTOLIC BLOOD PRESSURE: 83 MMHG | HEART RATE: 93 BPM | TEMPERATURE: 97.6 F | WEIGHT: 142.6 LBS | RESPIRATION RATE: 20 BRPM | OXYGEN SATURATION: 97 %

## 2023-09-25 LAB
ANION GAP SERPL CALC-SCNC: 11 MMOL/L (ref 2–11)
BACTERIA SPEC CULT: NORMAL
BACTERIA SPEC CULT: NORMAL
BASOPHILS # BLD: 0 K/UL (ref 0–0.2)
BASOPHILS NFR BLD: 0 % (ref 0–2)
BUN SERPL-MCNC: 29 MG/DL (ref 6–23)
CALCIUM SERPL-MCNC: 8.4 MG/DL (ref 8.3–10.4)
CHLORIDE SERPL-SCNC: 108 MMOL/L (ref 101–110)
CO2 SERPL-SCNC: 20 MMOL/L (ref 21–32)
CREAT SERPL-MCNC: 1.6 MG/DL (ref 0.8–1.5)
DIFFERENTIAL METHOD BLD: ABNORMAL
EOSINOPHIL # BLD: 0.1 K/UL (ref 0–0.8)
EOSINOPHIL NFR BLD: 1 % (ref 0.5–7.8)
ERYTHROCYTE [DISTWIDTH] IN BLOOD BY AUTOMATED COUNT: 16.4 % (ref 11.9–14.6)
GLUCOSE SERPL-MCNC: 125 MG/DL (ref 65–100)
HCT VFR BLD AUTO: 37.2 % (ref 41.1–50.3)
HGB BLD-MCNC: 12.7 G/DL (ref 13.6–17.2)
IMM GRANULOCYTES # BLD AUTO: 0 K/UL (ref 0–0.5)
IMM GRANULOCYTES NFR BLD AUTO: 0 % (ref 0–5)
LYMPHOCYTES # BLD: 2.2 K/UL (ref 0.5–4.6)
LYMPHOCYTES NFR BLD: 25 % (ref 13–44)
MCH RBC QN AUTO: 28.4 PG (ref 26.1–32.9)
MCHC RBC AUTO-ENTMCNC: 34.1 G/DL (ref 31.4–35)
MCV RBC AUTO: 83.2 FL (ref 82–102)
MONOCYTES # BLD: 0.6 K/UL (ref 0.1–1.3)
MONOCYTES NFR BLD: 7 % (ref 4–12)
NEUTS SEG # BLD: 5.7 K/UL (ref 1.7–8.2)
NEUTS SEG NFR BLD: 67 % (ref 43–78)
NRBC # BLD: 0.03 K/UL (ref 0–0.2)
PLATELET # BLD AUTO: 269 K/UL (ref 150–450)
PMV BLD AUTO: 10.3 FL (ref 9.4–12.3)
POTASSIUM SERPL-SCNC: 3.8 MMOL/L (ref 3.5–5.1)
RBC # BLD AUTO: 4.47 M/UL (ref 4.23–5.6)
SERVICE CMNT-IMP: NORMAL
SERVICE CMNT-IMP: NORMAL
SODIUM SERPL-SCNC: 139 MMOL/L (ref 133–143)
WBC # BLD AUTO: 8.7 K/UL (ref 4.3–11.1)

## 2023-09-25 PROCEDURE — 6370000000 HC RX 637 (ALT 250 FOR IP): Performed by: STUDENT IN AN ORGANIZED HEALTH CARE EDUCATION/TRAINING PROGRAM

## 2023-09-25 PROCEDURE — 80048 BASIC METABOLIC PNL TOTAL CA: CPT

## 2023-09-25 PROCEDURE — 6370000000 HC RX 637 (ALT 250 FOR IP): Performed by: HOSPITALIST

## 2023-09-25 PROCEDURE — 6370000000 HC RX 637 (ALT 250 FOR IP): Performed by: FAMILY MEDICINE

## 2023-09-25 PROCEDURE — 36415 COLL VENOUS BLD VENIPUNCTURE: CPT

## 2023-09-25 PROCEDURE — 85025 COMPLETE CBC W/AUTO DIFF WBC: CPT

## 2023-09-25 PROCEDURE — 6370000000 HC RX 637 (ALT 250 FOR IP): Performed by: SURGERY

## 2023-09-25 PROCEDURE — 99232 SBSQ HOSP IP/OBS MODERATE 35: CPT | Performed by: SURGERY

## 2023-09-25 PROCEDURE — 99232 SBSQ HOSP IP/OBS MODERATE 35: CPT | Performed by: INTERNAL MEDICINE

## 2023-09-25 PROCEDURE — 2580000003 HC RX 258: Performed by: FAMILY MEDICINE

## 2023-09-25 PROCEDURE — 6370000000 HC RX 637 (ALT 250 FOR IP): Performed by: INTERNAL MEDICINE

## 2023-09-25 RX ORDER — PANTOPRAZOLE SODIUM 40 MG/1
40 TABLET, DELAYED RELEASE ORAL
Qty: 30 TABLET | Refills: 3 | Status: ON HOLD | OUTPATIENT
Start: 2023-09-25 | End: 2023-09-29

## 2023-09-25 RX ORDER — AMOXICILLIN AND CLAVULANATE POTASSIUM 875; 125 MG/1; MG/1
1 TABLET, FILM COATED ORAL 2 TIMES DAILY
Qty: 8 TABLET | Refills: 0 | Status: ON HOLD | OUTPATIENT
Start: 2023-09-25 | End: 2023-09-29

## 2023-09-25 RX ORDER — SUCRALFATE 1 G/1
1 TABLET ORAL 4 TIMES DAILY
Qty: 40 TABLET | Refills: 0 | Status: ON HOLD | OUTPATIENT
Start: 2023-09-25 | End: 2023-10-05

## 2023-09-25 RX ADMIN — SUCRALFATE 1 G: 1 TABLET ORAL at 00:52

## 2023-09-25 RX ADMIN — APIXABAN 5 MG: 5 TABLET, FILM COATED ORAL at 09:21

## 2023-09-25 RX ADMIN — AZITHROMYCIN DIHYDRATE 500 MG: 250 TABLET ORAL at 09:20

## 2023-09-25 RX ADMIN — SUCRALFATE 1 G: 1 TABLET ORAL at 11:27

## 2023-09-25 RX ADMIN — EMPAGLIFLOZIN 10 MG: 10 TABLET, FILM COATED ORAL at 09:21

## 2023-09-25 RX ADMIN — SPIRONOLACTONE 25 MG: 25 TABLET ORAL at 09:21

## 2023-09-25 RX ADMIN — SUCRALFATE 1 G: 1 TABLET ORAL at 05:08

## 2023-09-25 RX ADMIN — DIPHENHYDRAMINE HYDROCHLORIDE 25 MG: 25 CAPSULE ORAL at 00:52

## 2023-09-25 RX ADMIN — METRONIDAZOLE 500 MG: 500 TABLET ORAL at 05:08

## 2023-09-25 RX ADMIN — PANTOPRAZOLE SODIUM 40 MG: 40 TABLET, DELAYED RELEASE ORAL at 05:08

## 2023-09-25 RX ADMIN — METOPROLOL SUCCINATE 100 MG: 100 TABLET, EXTENDED RELEASE ORAL at 09:21

## 2023-09-25 RX ADMIN — FUROSEMIDE 40 MG: 40 TABLET ORAL at 09:21

## 2023-09-25 RX ADMIN — SODIUM CHLORIDE, PRESERVATIVE FREE 10 ML: 5 INJECTION INTRAVENOUS at 09:21

## 2023-09-25 ASSESSMENT — PAIN SCALES - GENERAL
PAINLEVEL_OUTOF10: 0
PAINLEVEL_OUTOF10: 0

## 2023-09-25 NOTE — DISCHARGE SUMMARY
Hospitalist Discharge Summary   Admit Date:  2023 10:23 AM   DC Note date: 2023  Name:  Bhavya Cleveland   Age:  52 y.o. Sex:  male  :  1970   MRN:  [id number]   Room:  [facility]  PCP:  Sepideh Soto MD    Presenting Complaint: Abdominal Pain     Initial Admission Diagnosis: Left ventricular thrombus [I51.3]  Acute on chronic cholecystitis [K81.2]  Intracardiac thrombus [I51.3]  Pneumonia due to infectious organism, unspecified laterality, unspecified part of lung [J18.9]     Problem List for this Hospitalization (present on admission):    Principal Problem:    Intracardiac thrombus  Active Problems:    NICM (nonischemic cardiomyopathy) ([address])    ICD (implantable cardioverter-defibrillator) in place    Acute on chronic systolic (congestive) heart failure (HCC)    Hypertension    Aspiration pneumonia (HCC)    SIRS (systemic inflammatory response syndrome) (HCC)    Nausea and vomiting    Other fatigue    Advanced care planning/counseling discussion    Pulmonary infiltrates    Leukocytosis    Hyperbilirubinemia    Gilbert's syndrome    Epigastric pain    Pneumonia due to infectious organism    CKD (chronic kidney disease)  Resolved Problems:    * No resolved hospital problems. *      Hospital Course:  Bhavya Cleveland is a 52 y.o. male with medical history of CHF s/p ICD placement and hypertension who presented with diarrhea, abdominal pain, nausea, and vomiting. CT showed bibasilar pulmonary infiltrates suggests possible aspiration and incidental finding of LV thrombus. He was started on heparin infusion and cardiology was consulted. Abdominal ultrasound showed no evidence of acute cholecystitis. GI consulted and recommended outpatient follow up for EGD but no ERCP at this time. General surgery was following, but his abdominal pain resolved without evidence of acute cholecystitis. CXR showed pneumonia and he was started on antibiotics.   Repeat echo on  showed the LV thrombus was slightly smaller than

## 2023-09-25 NOTE — CARE COORDINATION
1448 Discharge order placed. Follow up with GI and Cardiology. No CM needs identified. Disposition: Home. CM reviewed clinicals. Patient's status discussed in IDR. Cardiology, General surgery, and GI managing symptoms. No procedures scheduled. Dysphagia with possible barium swallow ? Patient on room air.

## 2023-09-25 NOTE — PLAN OF CARE
Problem: Discharge Planning  Goal: Discharge to home or other facility with appropriate resources  Outcome: Completed  Flowsheets (Taken 9/25/2023 0800 by Soraya Ruiz, RN)  Discharge to home or other facility with appropriate resources: Identify barriers to discharge with patient and caregiver     Problem: ABCDS Injury Assessment  Goal: Absence of physical injury  Outcome: Completed     Problem: Chronic Conditions and Co-morbidities  Goal: Patient's chronic conditions and co-morbidity symptoms are monitored and maintained or improved  Outcome: Completed  Flowsheets (Taken 9/25/2023 0800 by Soraya Ruiz RN)  Care Plan - Patient's Chronic Conditions and Co-Morbidity Symptoms are Monitored and Maintained or Improved: Monitor and assess patient's chronic conditions and comorbid symptoms for stability, deterioration, or improvement     Problem: Pain  Goal: Verbalizes/displays adequate comfort level or baseline comfort level  Outcome: Completed     Problem: Safety - Adult  Goal: Free from fall injury  Outcome: Completed

## 2023-09-25 NOTE — DISCHARGE INSTRUCTIONS
advice about side effects. You may report side effects to FDA at 0-349-FDA-4526. What other drugs will affect amoxicillin and clavulanate potassium? Tell your doctor about all your other medicines, especially:  allopurinol;  probenecid; or  a blood thinner --warfarin, Coumadin, Jantoven. This list is not complete. Other drugs may affect amoxicillin and clavulanate potassium, including prescription and over-the-counter medicines, vitamins, and herbal products. Not all possible drug interactions are listed here. Where can I get more information? Your doctor or pharmacist can provide more information about amoxicillin and clavulanate potassium. Remember, keep this and all other medicines out of the reach of children, never share your medicines with others, and use this medication only for the indication prescribed. Every effort has been made to ensure that the information provided by [address] [place] [address] is accurate, up-to-date, and complete, but no guarantee is made to that effect. Drug information contained herein may be time sensitive. [employer] information has been compiled for use by healthcare practitioners and consumers in the [facility] and therefore [employer] does not warrant that uses outside of the [facility] are appropriate, unless specifically indicated otherwise. [facility] drug information does not endorse drugs, diagnose patients or recommend therapy. [employer] drug information is an informational resource designed to assist licensed healthcare practitioners in caring for their patients and/or to serve consumers viewing this service as a supplement to, and not a substitute for, the expertise, skill, knowledge and judgment of healthcare practitioners. The absence of a warning for a given drug or drug combination in no way should be construed to indicate that the drug or drug combination is safe, effective or appropriate for any given patient.  [employer] does not assume any responsibility for

## 2023-09-25 NOTE — PLAN OF CARE
Problem: Discharge Planning  Goal: Discharge to home or other facility with appropriate resources  Outcome: Progressing  Flowsheets (Taken 9/24/2023 2111)  Discharge to home or other facility with appropriate resources: Identify barriers to discharge with patient and caregiver     Problem: ABCDS Injury Assessment  Goal: Absence of physical injury  Outcome: Progressing  Flowsheets (Taken 9/24/2023 2111)  Absence of Physical Injury: Implement safety measures based on patient assessment     Problem: Chronic Conditions and Co-morbidities  Goal: Patient's chronic conditions and co-morbidity symptoms are monitored and maintained or improved  Outcome: Progressing  Flowsheets (Taken 9/24/2023 2111)  Care Plan - Patient's Chronic Conditions and Co-Morbidity Symptoms are Monitored and Maintained or Improved: Monitor and assess patient's chronic conditions and comorbid symptoms for stability, deterioration, or improvement     Problem: Pain  Goal: Verbalizes/displays adequate comfort level or baseline comfort level  Outcome: Progressing  Flowsheets (Taken 9/24/2023 2111)  Verbalizes/displays adequate comfort level or baseline comfort level: Encourage patient to monitor pain and request assistance     Problem: Safety - Adult  Goal: Free from fall injury  Outcome: Progressing  Flowsheets (Taken 9/24/2023 2111)  Free From Fall Injury: Instruct family/caregiver on patient safety

## 2023-09-28 ENCOUNTER — HOSPITAL ENCOUNTER (EMERGENCY)
Age: 53
Discharge: HOME | DRG: 286 | End: 2023-09-28
Attending: GENERAL PRACTICE
Payer: COMMERCIAL

## 2023-09-28 ENCOUNTER — APPOINTMENT (OUTPATIENT)
Dept: GENERAL RADIOLOGY | Age: 53
DRG: 286 | End: 2023-09-28
Payer: COMMERCIAL

## 2023-09-28 VITALS
HEIGHT: 67 IN | DIASTOLIC BLOOD PRESSURE: 98 MMHG | RESPIRATION RATE: 23 BRPM | OXYGEN SATURATION: 94 % | TEMPERATURE: 97.7 F | SYSTOLIC BLOOD PRESSURE: 115 MMHG | BODY MASS INDEX: 22.29 KG/M2 | HEART RATE: 101 BPM | WEIGHT: 142 LBS

## 2023-09-28 DIAGNOSIS — I50.9 CONGESTIVE HEART FAILURE, UNSPECIFIED HF CHRONICITY, UNSPECIFIED HEART FAILURE TYPE (HCC): ICD-10-CM

## 2023-09-28 DIAGNOSIS — R60.9 PERIPHERAL EDEMA: Primary | ICD-10-CM

## 2023-09-28 LAB
ALBUMIN SERPL-MCNC: 2.2 G/DL (ref 3.5–5)
ALBUMIN/GLOB SERPL: 0.5 (ref 0.4–1.6)
ALP SERPL-CCNC: 63 U/L (ref 50–136)
ALT SERPL-CCNC: 19 U/L (ref 12–65)
ANION GAP SERPL CALC-SCNC: 10 MMOL/L (ref 2–11)
AST SERPL-CCNC: 27 U/L (ref 15–37)
BASOPHILS # BLD: 0 K/UL (ref 0–0.2)
BASOPHILS NFR BLD: 0 % (ref 0–2)
BILIRUB SERPL-MCNC: 0.8 MG/DL (ref 0.2–1.1)
BUN SERPL-MCNC: 17 MG/DL (ref 6–23)
CALCIUM SERPL-MCNC: 8.3 MG/DL (ref 8.3–10.4)
CHLORIDE SERPL-SCNC: 108 MMOL/L (ref 101–110)
CO2 SERPL-SCNC: 24 MMOL/L (ref 21–32)
CREAT SERPL-MCNC: 1.4 MG/DL (ref 0.8–1.5)
DIFFERENTIAL METHOD BLD: ABNORMAL
EOSINOPHIL # BLD: 0.2 K/UL (ref 0–0.8)
EOSINOPHIL NFR BLD: 3 % (ref 0.5–7.8)
ERYTHROCYTE [DISTWIDTH] IN BLOOD BY AUTOMATED COUNT: 17.6 % (ref 11.9–14.6)
FLUID CULTURE: NORMAL
GLOBULIN SER CALC-MCNC: 4.2 G/DL (ref 2.8–4.5)
GLUCOSE SERPL-MCNC: 113 MG/DL (ref 65–100)
HCT VFR BLD AUTO: 40.2 % (ref 41.1–50.3)
HGB BLD-MCNC: 13.6 G/DL (ref 13.6–17.2)
IMM GRANULOCYTES # BLD AUTO: 0 K/UL (ref 0–0.5)
IMM GRANULOCYTES NFR BLD AUTO: 1 % (ref 0–5)
LYMPHOCYTES # BLD: 1.7 K/UL (ref 0.5–4.6)
LYMPHOCYTES NFR BLD: 20 % (ref 13–44)
Lab: NORMAL
MCH RBC QN AUTO: 28.8 PG (ref 26.1–32.9)
MCHC RBC AUTO-ENTMCNC: 33.8 G/DL (ref 31.4–35)
MCV RBC AUTO: 85.2 FL (ref 82–102)
MONOCYTES # BLD: 0.5 K/UL (ref 0.1–1.3)
MONOCYTES NFR BLD: 6 % (ref 4–12)
NEUTS SEG # BLD: 6.1 K/UL (ref 1.7–8.2)
NEUTS SEG NFR BLD: 70 % (ref 43–78)
NRBC # BLD: 0 K/UL (ref 0–0.2)
NT PRO BNP: ABNORMAL PG/ML (ref 5–125)
ORGANISM ID: NORMAL
PLATELET # BLD AUTO: 376 K/UL (ref 150–450)
PMV BLD AUTO: 10.3 FL (ref 9.4–12.3)
POTASSIUM SERPL-SCNC: 3.8 MMOL/L (ref 3.5–5.1)
PROT SERPL-MCNC: 6.4 G/DL (ref 6.3–8.2)
RBC # BLD AUTO: 4.72 M/UL (ref 4.23–5.6)
S PNEUM AG SPEC QL LA: NEGATIVE
SODIUM SERPL-SCNC: 142 MMOL/L (ref 133–143)
SPECIMEN SOURCE: NORMAL
SPECIMEN: NORMAL
TROPONIN I SERPL HS-MCNC: 86.5 PG/ML (ref 0–14)
WBC # BLD AUTO: 8.6 K/UL (ref 4.3–11.1)

## 2023-09-28 PROCEDURE — 99284 EMERGENCY DEPT VISIT MOD MDM: CPT

## 2023-09-28 PROCEDURE — 85025 COMPLETE CBC W/AUTO DIFF WBC: CPT

## 2023-09-28 PROCEDURE — 80053 COMPREHEN METABOLIC PANEL: CPT

## 2023-09-28 PROCEDURE — 83880 ASSAY OF NATRIURETIC PEPTIDE: CPT

## 2023-09-28 PROCEDURE — 96374 THER/PROPH/DIAG INJ IV PUSH: CPT

## 2023-09-28 PROCEDURE — 96375 TX/PRO/DX INJ NEW DRUG ADDON: CPT

## 2023-09-28 PROCEDURE — 71046 X-RAY EXAM CHEST 2 VIEWS: CPT

## 2023-09-28 PROCEDURE — 6360000002 HC RX W HCPCS: Performed by: GENERAL PRACTICE

## 2023-09-28 PROCEDURE — 84484 ASSAY OF TROPONIN QUANT: CPT

## 2023-09-28 RX ORDER — FUROSEMIDE 10 MG/ML
40 INJECTION INTRAMUSCULAR; INTRAVENOUS ONCE
Status: COMPLETED | OUTPATIENT
Start: 2023-09-28 | End: 2023-09-28

## 2023-09-28 RX ADMIN — FUROSEMIDE 40 MG: 10 INJECTION, SOLUTION INTRAMUSCULAR; INTRAVENOUS at 08:39

## 2023-09-28 ASSESSMENT — LIFESTYLE VARIABLES
HOW OFTEN DO YOU HAVE A DRINK CONTAINING ALCOHOL: NEVER
HOW MANY STANDARD DRINKS CONTAINING ALCOHOL DO YOU HAVE ON A TYPICAL DAY: PATIENT DOES NOT DRINK

## 2023-09-28 ASSESSMENT — PAIN - FUNCTIONAL ASSESSMENT: PAIN_FUNCTIONAL_ASSESSMENT: NONE - DENIES PAIN

## 2023-09-28 NOTE — ED PROVIDER NOTES
Emergency Department Provider Note       PCP: Pennye Mortimer, MD   Age: 52 y.o. Sex: male     DISPOSITION Decision To Discharge 09/28/2023 12:28:35 PM       ICD-10-CM    1. Peripheral edema  R60.9       2. Congestive heart failure, unspecified HF chronicity, unspecified heart failure type ([address])  I50.9           Medical Decision Making     Complexity of Problems Addressed:  1 or more chronic illnesses with a severe exacerbation or progression. Data Reviewed and Analyzed:   I independently ordered and reviewed each unique test.  I reviewed external records: provider visit note from outside specialist.       Chest x-ray shows persistent infiltrate but no significant edema or worsening findings. I have reviewed and agree with radiology report. Discussion of management or test interpretation. Patient presents with lower extremity edema. His respiratory status is at baseline. Patient was on oxygen in the room but I took it off and he has been saturating fine. Patient was given Lasix here with improvement of symptoms and diuresis. I believe this is likely due to his congestive heart disease. However, he has no respiratory distress or oxygen demand. I do not feel he needs inpatient admission for this. Patient already has follow-up appointment tomorrow with Dr. Sandee Samayoa at [facility] cardiology. I believe it is reasonable for him to follow-up with Dr. Sandee Samayoa and to discuss things with him. Strict return precautions are given. Risk of Complications and/or Morbidity of Patient Management:  Drug therapy given requiring intensive monitoring for toxicity. Chronic medical problems impacting care include congestive heart disease. Shared medical decision making was utilized in creating the patients health plan today.     ED Course as of 09/28/23 1229   Thu Sep 28, 2023   0631 EKG Interpretation: Sinus tachycardia, rate of 108, leftward axis, ST depression in V5 and V6 with some subtle elevation noted in V2, 3 and

## 2023-09-28 NOTE — ED TRIAGE NOTES
C/o bilateral leg swelling that started yesterday afternoon. Pitting edema to both legs. Denies any other c/o at this time. Compliant with diuretics at home. Recently discharged from hospital for blood clots.

## 2023-09-29 ENCOUNTER — HOSPITAL ENCOUNTER (INPATIENT)
Age: 53
LOS: 3 days | Discharge: ANOTHER HOSPITAL | DRG: 286 | End: 2023-10-03
Attending: INTERNAL MEDICINE | Admitting: INTERNAL MEDICINE
Payer: COMMERCIAL

## 2023-09-29 ENCOUNTER — OFFICE VISIT (OUTPATIENT)
Age: 53
End: 2023-09-29

## 2023-09-29 ENCOUNTER — APPOINTMENT (OUTPATIENT)
Dept: GENERAL RADIOLOGY | Age: 53
DRG: 286 | End: 2023-09-29
Attending: INTERNAL MEDICINE
Payer: COMMERCIAL

## 2023-09-29 VITALS
DIASTOLIC BLOOD PRESSURE: 84 MMHG | BODY MASS INDEX: 22.29 KG/M2 | HEART RATE: 109 BPM | SYSTOLIC BLOOD PRESSURE: 102 MMHG | OXYGEN SATURATION: 93 % | HEIGHT: 67 IN | WEIGHT: 142 LBS

## 2023-09-29 DIAGNOSIS — I10 HYPERTENSION, UNSPECIFIED TYPE: ICD-10-CM

## 2023-09-29 DIAGNOSIS — E61.1 IRON DEFICIENCY: ICD-10-CM

## 2023-09-29 DIAGNOSIS — I42.8 NICM (NONISCHEMIC CARDIOMYOPATHY) (HCC): ICD-10-CM

## 2023-09-29 DIAGNOSIS — I50.23 ACUTE ON CHRONIC HFREF (HEART FAILURE WITH REDUCED EJECTION FRACTION) (HCC): Primary | ICD-10-CM

## 2023-09-29 DIAGNOSIS — Z95.810 ICD (IMPLANTABLE CARDIOVERTER-DEFIBRILLATOR) IN PLACE: ICD-10-CM

## 2023-09-29 DIAGNOSIS — I24.0 LEFT VENTRICULAR THROMBUS WITHOUT MI (HCC): ICD-10-CM

## 2023-09-29 DIAGNOSIS — E78.5 HYPERLIPIDEMIA, UNSPECIFIED HYPERLIPIDEMIA TYPE: ICD-10-CM

## 2023-09-29 DIAGNOSIS — R06.02 SOB (SHORTNESS OF BREATH): ICD-10-CM

## 2023-09-29 PROBLEM — N18.31 STAGE 3A CHRONIC KIDNEY DISEASE (HCC): Chronic | Status: ACTIVE | Noted: 2023-09-23

## 2023-09-29 PROBLEM — J43.2 CENTRILOBULAR EMPHYSEMA (HCC): Chronic | Status: ACTIVE | Noted: 2023-09-29

## 2023-09-29 PROBLEM — R07.89 ATYPICAL CHEST PAIN: Status: RESOLVED | Noted: 2022-12-29 | Resolved: 2023-09-29

## 2023-09-29 PROBLEM — I50.9 HEART FAILURE (HCC): Status: RESOLVED | Noted: 2023-05-31 | Resolved: 2023-09-29

## 2023-09-29 PROBLEM — I50.9 ACUTE DECOMPENSATED HEART FAILURE (HCC): Status: RESOLVED | Noted: 2023-08-03 | Resolved: 2023-09-29

## 2023-09-29 PROBLEM — E80.6 HYPERBILIRUBINEMIA: Status: RESOLVED | Noted: 2023-09-21 | Resolved: 2023-09-29

## 2023-09-29 PROBLEM — E87.20 LACTIC ACIDOSIS: Status: ACTIVE | Noted: 2023-09-29

## 2023-09-29 LAB
ALBUMIN SERPL-MCNC: 2.1 G/DL (ref 3.5–5)
ALBUMIN/GLOB SERPL: 0.5 (ref 0.4–1.6)
ALP SERPL-CCNC: 65 U/L (ref 50–136)
ALT SERPL-CCNC: 21 U/L (ref 12–65)
ANION GAP SERPL CALC-SCNC: 10 MMOL/L (ref 2–11)
ARTERIAL PATENCY WRIST A: POSITIVE
AST SERPL-CCNC: 27 U/L (ref 15–37)
BASE DEFICIT BLD-SCNC: 4.7 MMOL/L
BASOPHILS # BLD: 0 K/UL (ref 0–0.2)
BASOPHILS NFR BLD: 1 % (ref 0–2)
BDY SITE: ABNORMAL
BILIRUB SERPL-MCNC: 0.9 MG/DL (ref 0.2–1.1)
BUN SERPL-MCNC: 20 MG/DL (ref 6–23)
CALCIUM SERPL-MCNC: 8.4 MG/DL (ref 8.3–10.4)
CHLORIDE SERPL-SCNC: 111 MMOL/L (ref 101–110)
CO2 SERPL-SCNC: 20 MMOL/L (ref 21–32)
CREAT SERPL-MCNC: 1.5 MG/DL (ref 0.8–1.5)
DIFFERENTIAL METHOD BLD: ABNORMAL
EKG ATRIAL RATE: 107 BPM
EKG DIAGNOSIS: NORMAL
EKG P AXIS: 68 DEGREES
EKG P-R INTERVAL: 170 MS
EKG Q-T INTERVAL: 372 MS
EKG QRS DURATION: 108 MS
EKG QTC CALCULATION (BAZETT): 496 MS
EKG R AXIS: 262 DEGREES
EKG T AXIS: 74 DEGREES
EKG VENTRICULAR RATE: 107 BPM
EOSINOPHIL # BLD: 0.2 K/UL (ref 0–0.8)
EOSINOPHIL NFR BLD: 2 % (ref 0.5–7.8)
ERYTHROCYTE [DISTWIDTH] IN BLOOD BY AUTOMATED COUNT: 18.3 % (ref 11.9–14.6)
GAS FLOW.O2 O2 DELIVERY SYS: ABNORMAL
GLOBULIN SER CALC-MCNC: 4.3 G/DL (ref 2.8–4.5)
GLUCOSE SERPL-MCNC: 128 MG/DL (ref 65–100)
HCO3 BLD-SCNC: 17.3 MMOL/L (ref 22–26)
HCT VFR BLD AUTO: 43.6 % (ref 41.1–50.3)
HGB BLD-MCNC: 14 G/DL (ref 13.6–17.2)
IMM GRANULOCYTES # BLD AUTO: 0 K/UL (ref 0–0.5)
IMM GRANULOCYTES NFR BLD AUTO: 0 % (ref 0–5)
IRON SATN MFR SERPL: 21 %
IRON SERPL-MCNC: 50 UG/DL (ref 35–150)
LACTATE SERPL-SCNC: 3.1 MMOL/L (ref 0.4–2)
LACTATE SERPL-SCNC: 4.1 MMOL/L (ref 0.4–2)
LACTATE SERPL-SCNC: 4.1 MMOL/L (ref 0.4–2)
LYMPHOCYTES # BLD: 1.6 K/UL (ref 0.5–4.6)
LYMPHOCYTES NFR BLD: 21 % (ref 13–44)
MAGNESIUM SERPL-MCNC: 2 MG/DL (ref 1.8–2.4)
MCH RBC QN AUTO: 28.5 PG (ref 26.1–32.9)
MCHC RBC AUTO-ENTMCNC: 32.1 G/DL (ref 31.4–35)
MCV RBC AUTO: 88.8 FL (ref 82–102)
MONOCYTES # BLD: 0.4 K/UL (ref 0.1–1.3)
MONOCYTES NFR BLD: 5 % (ref 4–12)
NEUTS SEG # BLD: 5.2 K/UL (ref 1.7–8.2)
NEUTS SEG NFR BLD: 71 % (ref 43–78)
NRBC # BLD: 0 K/UL (ref 0–0.2)
NT PRO BNP: ABNORMAL PG/ML (ref 5–125)
PCO2 BLD: 24.9 MMHG (ref 35–45)
PH BLD: 7.45 (ref 7.35–7.45)
PLATELET # BLD AUTO: 450 K/UL (ref 150–450)
PMV BLD AUTO: 10.2 FL (ref 9.4–12.3)
PO2 BLD: 88 MMHG (ref 75–100)
POTASSIUM SERPL-SCNC: 4.2 MMOL/L (ref 3.5–5.1)
PROCALCITONIN SERPL-MCNC: 0.47 NG/ML (ref 0–0.49)
PROT SERPL-MCNC: 6.4 G/DL (ref 6.3–8.2)
RBC # BLD AUTO: 4.91 M/UL (ref 4.23–5.6)
SAO2 % BLD: 97.4 % (ref 95–98)
SARS-COV-2 RDRP RESP QL NAA+PROBE: NOT DETECTED
SERVICE CMNT-IMP: ABNORMAL
SODIUM SERPL-SCNC: 141 MMOL/L (ref 133–143)
SOURCE: NORMAL
SPECIMEN TYPE: ABNORMAL
TIBC SERPL-MCNC: 234 UG/DL (ref 250–450)
WBC # BLD AUTO: 7.3 K/UL (ref 4.3–11.1)

## 2023-09-29 PROCEDURE — 87635 SARS-COV-2 COVID-19 AMP PRB: CPT

## 2023-09-29 PROCEDURE — 94640 AIRWAY INHALATION TREATMENT: CPT

## 2023-09-29 PROCEDURE — 36600 WITHDRAWAL OF ARTERIAL BLOOD: CPT

## 2023-09-29 PROCEDURE — 82803 BLOOD GASES ANY COMBINATION: CPT

## 2023-09-29 PROCEDURE — 85025 COMPLETE CBC W/AUTO DIFF WBC: CPT

## 2023-09-29 PROCEDURE — G0378 HOSPITAL OBSERVATION PER HR: HCPCS

## 2023-09-29 PROCEDURE — 93005 ELECTROCARDIOGRAM TRACING: CPT | Performed by: PHYSICIAN ASSISTANT

## 2023-09-29 PROCEDURE — 96375 TX/PRO/DX INJ NEW DRUG ADDON: CPT

## 2023-09-29 PROCEDURE — 6370000000 HC RX 637 (ALT 250 FOR IP): Performed by: INTERNAL MEDICINE

## 2023-09-29 PROCEDURE — 2580000003 HC RX 258: Performed by: PHYSICIAN ASSISTANT

## 2023-09-29 PROCEDURE — 83735 ASSAY OF MAGNESIUM: CPT

## 2023-09-29 PROCEDURE — 99223 1ST HOSP IP/OBS HIGH 75: CPT | Performed by: INTERNAL MEDICINE

## 2023-09-29 PROCEDURE — 83550 IRON BINDING TEST: CPT

## 2023-09-29 PROCEDURE — 83880 ASSAY OF NATRIURETIC PEPTIDE: CPT

## 2023-09-29 PROCEDURE — 96376 TX/PRO/DX INJ SAME DRUG ADON: CPT

## 2023-09-29 PROCEDURE — 2580000003 HC RX 258: Performed by: FAMILY MEDICINE

## 2023-09-29 PROCEDURE — 2500000003 HC RX 250 WO HCPCS: Performed by: PHYSICIAN ASSISTANT

## 2023-09-29 PROCEDURE — 93010 ELECTROCARDIOGRAM REPORT: CPT | Performed by: INTERNAL MEDICINE

## 2023-09-29 PROCEDURE — 6360000002 HC RX W HCPCS: Performed by: FAMILY MEDICINE

## 2023-09-29 PROCEDURE — 2700000000 HC OXYGEN THERAPY PER DAY

## 2023-09-29 PROCEDURE — 84145 PROCALCITONIN (PCT): CPT

## 2023-09-29 PROCEDURE — 6370000000 HC RX 637 (ALT 250 FOR IP): Performed by: PHYSICIAN ASSISTANT

## 2023-09-29 PROCEDURE — 83605 ASSAY OF LACTIC ACID: CPT

## 2023-09-29 PROCEDURE — 71045 X-RAY EXAM CHEST 1 VIEW: CPT

## 2023-09-29 PROCEDURE — 94760 N-INVAS EAR/PLS OXIMETRY 1: CPT

## 2023-09-29 PROCEDURE — 36415 COLL VENOUS BLD VENIPUNCTURE: CPT

## 2023-09-29 PROCEDURE — 6360000002 HC RX W HCPCS: Performed by: PHYSICIAN ASSISTANT

## 2023-09-29 PROCEDURE — 80053 COMPREHEN METABOLIC PANEL: CPT

## 2023-09-29 PROCEDURE — 87040 BLOOD CULTURE FOR BACTERIA: CPT

## 2023-09-29 PROCEDURE — 83540 ASSAY OF IRON: CPT

## 2023-09-29 RX ORDER — METOPROLOL SUCCINATE 100 MG/1
100 TABLET, EXTENDED RELEASE ORAL 2 TIMES DAILY
Status: DISCONTINUED | OUTPATIENT
Start: 2023-09-29 | End: 2023-09-30

## 2023-09-29 RX ORDER — POLYETHYLENE GLYCOL 3350 17 G/17G
17 POWDER, FOR SOLUTION ORAL DAILY PRN
Status: DISCONTINUED | OUTPATIENT
Start: 2023-09-29 | End: 2023-10-03 | Stop reason: HOSPADM

## 2023-09-29 RX ORDER — ARFORMOTEROL TARTRATE 15 UG/2ML
15 SOLUTION RESPIRATORY (INHALATION)
Status: DISCONTINUED | OUTPATIENT
Start: 2023-09-29 | End: 2023-10-03 | Stop reason: HOSPADM

## 2023-09-29 RX ORDER — BUDESONIDE 0.5 MG/2ML
0.5 INHALANT ORAL
Status: DISCONTINUED | OUTPATIENT
Start: 2023-09-29 | End: 2023-10-03 | Stop reason: HOSPADM

## 2023-09-29 RX ORDER — FLUTICASONE FUROATE, UMECLIDINIUM BROMIDE AND VILANTEROL TRIFENATATE 100; 62.5; 25 UG/1; UG/1; UG/1
POWDER RESPIRATORY (INHALATION)
Status: ON HOLD | COMMUNITY
Start: 2023-09-14

## 2023-09-29 RX ORDER — SPIRONOLACTONE 25 MG/1
25 TABLET ORAL DAILY
Status: DISCONTINUED | OUTPATIENT
Start: 2023-09-29 | End: 2023-10-03 | Stop reason: HOSPADM

## 2023-09-29 RX ORDER — SODIUM CHLORIDE 0.9 % (FLUSH) 0.9 %
5-40 SYRINGE (ML) INJECTION PRN
Status: DISCONTINUED | OUTPATIENT
Start: 2023-09-29 | End: 2023-10-03 | Stop reason: HOSPADM

## 2023-09-29 RX ORDER — ACETAMINOPHEN 325 MG/1
650 TABLET ORAL EVERY 6 HOURS PRN
Status: DISCONTINUED | OUTPATIENT
Start: 2023-09-29 | End: 2023-10-03 | Stop reason: HOSPADM

## 2023-09-29 RX ORDER — SODIUM CHLORIDE 9 MG/ML
INJECTION, SOLUTION INTRAVENOUS PRN
Status: DISCONTINUED | OUTPATIENT
Start: 2023-09-29 | End: 2023-10-03 | Stop reason: HOSPADM

## 2023-09-29 RX ORDER — LEVALBUTEROL INHALATION SOLUTION 1.25 MG/3ML
1.25 SOLUTION RESPIRATORY (INHALATION) EVERY 8 HOURS PRN
Status: DISCONTINUED | OUTPATIENT
Start: 2023-09-29 | End: 2023-10-03 | Stop reason: HOSPADM

## 2023-09-29 RX ORDER — PRAVASTATIN SODIUM 20 MG
10 TABLET ORAL DAILY
Status: DISCONTINUED | OUTPATIENT
Start: 2023-09-29 | End: 2023-10-03 | Stop reason: HOSPADM

## 2023-09-29 RX ORDER — SODIUM CHLORIDE 0.9 % (FLUSH) 0.9 %
5-40 SYRINGE (ML) INJECTION EVERY 12 HOURS SCHEDULED
Status: DISCONTINUED | OUTPATIENT
Start: 2023-09-29 | End: 2023-10-03 | Stop reason: HOSPADM

## 2023-09-29 RX ORDER — PANTOPRAZOLE SODIUM 40 MG/1
40 TABLET, DELAYED RELEASE ORAL
Status: DISCONTINUED | OUTPATIENT
Start: 2023-09-29 | End: 2023-10-03 | Stop reason: HOSPADM

## 2023-09-29 RX ORDER — SODIUM CHLORIDE 0.9 % (FLUSH) 0.9 %
5-40 SYRINGE (ML) INJECTION EVERY 12 HOURS SCHEDULED
Status: DISCONTINUED | OUTPATIENT
Start: 2023-09-29 | End: 2023-10-01

## 2023-09-29 RX ORDER — FUROSEMIDE 10 MG/ML
40 INJECTION INTRAMUSCULAR; INTRAVENOUS 2 TIMES DAILY
Status: DISCONTINUED | OUTPATIENT
Start: 2023-09-29 | End: 2023-10-03 | Stop reason: HOSPADM

## 2023-09-29 RX ORDER — MAGNESIUM SULFATE IN WATER 40 MG/ML
2000 INJECTION, SOLUTION INTRAVENOUS PRN
Status: DISCONTINUED | OUTPATIENT
Start: 2023-09-29 | End: 2023-10-03 | Stop reason: HOSPADM

## 2023-09-29 RX ADMIN — FUROSEMIDE 40 MG: 10 INJECTION, SOLUTION INTRAMUSCULAR; INTRAVENOUS at 18:04

## 2023-09-29 RX ADMIN — METOPROLOL SUCCINATE 100 MG: 100 TABLET, EXTENDED RELEASE ORAL at 20:58

## 2023-09-29 RX ADMIN — PANTOPRAZOLE SODIUM 40 MG: 40 TABLET, DELAYED RELEASE ORAL at 18:00

## 2023-09-29 RX ADMIN — BUDESONIDE 500 MCG: 0.5 INHALANT RESPIRATORY (INHALATION) at 22:42

## 2023-09-29 RX ADMIN — SODIUM CHLORIDE, PRESERVATIVE FREE 10 ML: 5 INJECTION INTRAVENOUS at 21:03

## 2023-09-29 RX ADMIN — TUBERCULIN PURIFIED PROTEIN DERIVATIVE 5 UNITS: 5 INJECTION, SOLUTION INTRADERMAL at 18:19

## 2023-09-29 RX ADMIN — TIOTROPIUM BROMIDE INHALATION SPRAY 2 PUFF: 3.12 SPRAY, METERED RESPIRATORY (INHALATION) at 18:50

## 2023-09-29 RX ADMIN — APIXABAN 5 MG: 5 TABLET, FILM COATED ORAL at 20:58

## 2023-09-29 RX ADMIN — WATER 40 MG: 1 INJECTION INTRAMUSCULAR; INTRAVENOUS; SUBCUTANEOUS at 20:58

## 2023-09-29 RX ADMIN — LEVALBUTEROL HYDROCHLORIDE 1.25 MG: 1.25 SOLUTION RESPIRATORY (INHALATION) at 22:58

## 2023-09-29 ASSESSMENT — PAIN SCALES - GENERAL
PAINLEVEL_OUTOF10: 0

## 2023-09-29 NOTE — CARE COORDINATION
ASSESSMENT NOTE    Attending Physician: Melina Glasgow MD  Admit Problem: Acute on chronic systolic heart failure ([address]) [I50.23]  Date/Time of Admission: 9/29/2023  1:03 PM  Problem List:  Patient Active Problem List   Diagnosis    Hypertension    Bilateral lower extremity edema    History of CHF (congestive heart failure)    Hyperlipidemia    Atypical chest pain    Chronic HFrEF (heart failure with reduced ejection fraction) (HCC)    NICM (nonischemic cardiomyopathy) ([address])    ICD (implantable cardioverter-defibrillator) in place    Acute on chronic HFrEF (heart failure with reduced ejection fraction) ([address])    Acute on chronic congestive heart failure (HCC)    Heart failure ([address])    Acute decompensated heart failure (HCC)    Ill-defined condition    Intracardiac thrombus    Aspiration pneumonia (HCC)    SIRS (systemic inflammatory response syndrome) (HCC)    Nausea and vomiting    Other fatigue    Encounter for palliative care    Advanced care planning/counseling discussion    Pulmonary infiltrates    Leukocytosis    Hyperbilirubinemia    Gilbert's syndrome    Epigastric pain    Pneumonia due to infectious organism    CKD (chronic kidney disease)    Iron deficiency    Left ventricular thrombus without MI ([address])    Acute on chronic systolic heart failure ([address])       Service Assessment  Patient Orientation (P) Alert and Oriented   Cognition (P) Alert   History Provided By (P) Patient, Medical Record   Primary Caregiver (P) Self   Accompanied By/Relationship (P) n/a   Support Systems (P) Spouse/Significant Other   Patient's Healthcare Decision Maker is: (P) Legal Next of Kin   PCP Verified by CM (P) Yes   Last Visit to PCP (P) Within last 3 months   Prior Functional Level (P) Independent in ADLs/IADLs   Current Functional Level (P) Independent in ADLs/IADLs   Can patient return to prior living arrangement (P) Yes   Ability to make needs known: (P) Good   Family able to assist with home care needs: (P) Yes   Would you patient can afford medications  Discharge plan is home  with family assist once edema has improved. Discharge plan ongoing, no further concerns as of present. Please consult  if any new issues arise.       Mikie Sim RN 09/29/23 2:26 PM

## 2023-09-29 NOTE — CONSULTS
- 65 U/L    AST 27 15 - 37 U/L    Alk Phosphatase 65 50 - 136 U/L    Total Protein 6.4 6.3 - 8.2 g/dL    Albumin 2.1 (L) 3.5 - 5.0 g/dL    Globulin 4.3 2.8 - 4.5 g/dL    Albumin/Globulin Ratio 0.5 0.4 - 1.6     CBC with Auto Differential    Collection Time: 09/29/23  2:31 PM   Result Value Ref Range    WBC 7.3 4.3 - 11.1 K/uL    RBC 4.91 4.23 - 5.6 M/uL    Hemoglobin 14.0 13.6 - 17.2 g/dL    Hematocrit 43.6 41.1 - 50.3 %    MCV 88.8 82 - 102 FL    MCH 28.5 26.1 - 32.9 PG    MCHC 32.1 31.4 - 35.0 g/dL    RDW 18.3 (H) 11.9 - 14.6 %    Platelets 635 067 - 768 K/uL    MPV 10.2 9.4 - 12.3 FL    nRBC 0.00 0.0 - 0.2 K/uL    Differential Type AUTOMATED      Neutrophils % 71 43 - 78 %    Lymphocytes % 21 13 - 44 %    Monocytes % 5 4.0 - 12.0 %    Eosinophils % 2 0.5 - 7.8 %    Basophils % 1 0.0 - 2.0 %    Immature Granulocytes 0 0.0 - 5.0 %    Neutrophils Absolute 5.2 1.7 - 8.2 K/UL    Lymphocytes Absolute 1.6 0.5 - 4.6 K/UL    Monocytes Absolute 0.4 0.1 - 1.3 K/UL    Eosinophils Absolute 0.2 0.0 - 0.8 K/UL    Basophils Absolute 0.0 0.0 - 0.2 K/UL    Absolute Immature Granulocyte 0.0 0.0 - 0.5 K/UL   Magnesium    Collection Time: 09/29/23  2:31 PM   Result Value Ref Range    Magnesium 2.0 1.8 - 2.4 mg/dL   Lactate, Sepsis    Collection Time: 09/29/23  2:31 PM   Result Value Ref Range    Lactic Acid, Sepsis 4.1 (HH) 0.4 - 2.0 MMOL/L   Brain Natriuretic Peptide    Collection Time: 09/29/23  2:31 PM   Result Value Ref Range    NT Pro-BNP 13,576 (H) 5 - 125 PG/ML       I have personally reviewed imaging studies showing:  XR CHEST PORTABLE    Result Date: 9/29/2023  Portable chest x-ray CLINICAL INDICATION: CHF. FINDINGS: Single AP view the chest compared to a similar exam dated 9/20/2023 shows persistent airspace opacity at the right lower lung. This is unchanged. The cardiac silhouette and mediastinum are stable. An AICD is in place. There is no pneumothorax.      No significant change in the right lower lung airspace opacity. XR CHEST (2 VW)    Result Date: 9/28/2023  EXAMINATION: XR CHEST (2 VW)  DATE OF EXAM: 9/28/2023 6:49 AM HISTORY: peripheral edema, SOB COMPARISON: 9/22/2023. FINDINGS: Cardiomegaly and right basilar airspace disease similar. Lungs are otherwise clear. Cardiac pacer. Bones and upper abdomen are normal.     1.  Right basilar airspace disease that could be pneumonia or aspiration and is unchanged. Reji Baker M.D. 9/28/2023 7:01:00 AM      Echocardiogram:  09/20/23    ECHO (TTE) LIMITED (CONTRAST/BUBBLE/3D PRN) 09/24/2023  2:00 PM (Final)    Interpretation Summary    Left Ventricle: Severely reduced left ventricular systolic function with a visually estimated EF of 10 -15%. Left ventricle is mildly dilated. Normal wall thickness. Severe global hypokinesis present. There is a large irregular mass present in the apex extending to the zachary-lateral walls(5.1X 2.0cm) consistent with thrombus. Right Ventricle: Moderately reduced systolic function. Left Atrium: Left atrium is moderately dilated. Contrast used: Definity. Limited study- Compared to the prior study, Large LV thrombus is still present although appears smaller. Signed by: Efe Singh MD on 9/24/2023  2:00 PM        Signed:  Brian Prince DO    Part of this note may have been written by using a voice dictation software. The note has been proof read but may still contain some grammatical/other typographical errors.

## 2023-09-29 NOTE — PROGRESS NOTES
[facility] CARDIOLOGY Follow Up                 Reason for Visit: Posthospitalization follow-up    Subjective:     Patient is a 52 y.o. male with a PMH of chronic HFrEF, LV thrombus, NICM, status post ICD, hypertension, and hyperlipidemia who presents for follow-up. The patient was last seen in August 2023. Imdur was discontinued with no compelling indication for use. He was recently hospitalized after presenting to the ED with abdominal pain, nausea and vomiting. A CT of the abdomen pelvis noted an incidental finding of an LV thrombus. He had a TTE on September 21 that was noted to demonstrate an EF of 10 to 15% and an LV thrombus. He was started on IV UFH and transitioned to Eliquis. The patient was noted to have a positive fecal occult blood. GI was consulted and \"recommended outpatient follow-up for EGD\". His RV function was noted to be depressed. The RVSP was noted to be 61 mmHg. The patient was ultimately discharged but represented to the ED yesterday with peripheral edema and dyspnea at rest.  His NT proBNP was noted to be 16,329. His CXR noted right basilar airspace disease that was nonspecific. The patient was discharged from the ED. He has an appointment with [address] [place] [address] for consideration of advanced options in about 3 weeks. He reports peripheral edema and shortness of breath at rest.  His functional capacity is poor such that he is now sedentary due to his symptoms. Past Medical History:   Diagnosis Date    COPD (chronic obstructive pulmonary disease) ([address])     Hypertension       Past Surgical History:   Procedure Laterality Date    CARDIAC PROCEDURE N/A 5/31/2023    Left heart cath / coronary angiography performed by Geni Delong MD at [facility] CATH LAB    EP DEVICE PROCEDURE N/A 5/9/2023    Insert ICD dual performed by Froy Joyce MD at [facility] CATH LAB      No family history on file.    Social History     Tobacco Use    Smoking status: Former     Types:

## 2023-09-30 LAB
ANION GAP SERPL CALC-SCNC: 10 MMOL/L (ref 2–11)
APPEARANCE UR: CLEAR
BACTERIA URNS QL MICRO: ABNORMAL /HPF
BILIRUB UR QL: ABNORMAL
BUN SERPL-MCNC: 24 MG/DL (ref 6–23)
CALCIUM SERPL-MCNC: 8.2 MG/DL (ref 8.3–10.4)
CASTS URNS QL MICRO: ABNORMAL /LPF
CHLORIDE SERPL-SCNC: 108 MMOL/L (ref 101–110)
CO2 SERPL-SCNC: 21 MMOL/L (ref 21–32)
COLOR UR: ABNORMAL
CREAT SERPL-MCNC: 1.5 MG/DL (ref 0.8–1.5)
CRYSTALS URNS QL MICRO: 0 /LPF
EPI CELLS #/AREA URNS HPF: ABNORMAL /HPF
ERYTHROCYTE [DISTWIDTH] IN BLOOD BY AUTOMATED COUNT: 17 % (ref 11.9–14.6)
GLUCOSE SERPL-MCNC: 174 MG/DL (ref 65–100)
GLUCOSE UR STRIP.AUTO-MCNC: NEGATIVE MG/DL
HCT VFR BLD AUTO: 38.8 % (ref 41.1–50.3)
HGB BLD-MCNC: 13.2 G/DL (ref 13.6–17.2)
HGB UR QL STRIP: ABNORMAL
KETONES UR QL STRIP.AUTO: ABNORMAL MG/DL
LACTATE SERPL-SCNC: 3.1 MMOL/L (ref 0.4–2)
LACTATE SERPL-SCNC: 3.1 MMOL/L (ref 0.4–2)
LACTATE SERPL-SCNC: 3.2 MMOL/L (ref 0.4–2)
LACTATE SERPL-SCNC: 3.5 MMOL/L (ref 0.4–2)
LACTATE SERPL-SCNC: 4.1 MMOL/L (ref 0.4–2)
LACTATE SERPL-SCNC: 4.6 MMOL/L (ref 0.4–2)
LEUKOCYTE ESTERASE UR QL STRIP.AUTO: ABNORMAL
MCH RBC QN AUTO: 28.6 PG (ref 26.1–32.9)
MCHC RBC AUTO-ENTMCNC: 34 G/DL (ref 31.4–35)
MCV RBC AUTO: 84.2 FL (ref 82–102)
MM INDURATION, POC: 1 MM (ref 0–5)
MUCOUS THREADS URNS QL MICRO: ABNORMAL /LPF
NITRITE UR QL STRIP.AUTO: NEGATIVE
NRBC # BLD: 0 K/UL (ref 0–0.2)
OTHER OBSERVATIONS: ABNORMAL
PH UR STRIP: 5.5 (ref 5–9)
PLATELET # BLD AUTO: 413 K/UL (ref 150–450)
PMV BLD AUTO: 10 FL (ref 9.4–12.3)
POTASSIUM SERPL-SCNC: 3.8 MMOL/L (ref 3.5–5.1)
PPD, POC: NEGATIVE
PROT UR STRIP-MCNC: >300 MG/DL
RBC # BLD AUTO: 4.61 M/UL (ref 4.23–5.6)
RBC #/AREA URNS HPF: ABNORMAL /HPF
SODIUM SERPL-SCNC: 139 MMOL/L (ref 133–143)
SP GR UR REFRACTOMETRY: 1.02 (ref 1–1.02)
URINE CULTURE IF INDICATED: ABNORMAL
UROBILINOGEN UR QL STRIP.AUTO: 0.2 EU/DL (ref 0.2–1)
WBC # BLD AUTO: 7.6 K/UL (ref 4.3–11.1)
WBC URNS QL MICRO: ABNORMAL /HPF

## 2023-09-30 PROCEDURE — 2580000003 HC RX 258: Performed by: PHYSICIAN ASSISTANT

## 2023-09-30 PROCEDURE — 2580000003 HC RX 258: Performed by: FAMILY MEDICINE

## 2023-09-30 PROCEDURE — 83605 ASSAY OF LACTIC ACID: CPT

## 2023-09-30 PROCEDURE — 6370000000 HC RX 637 (ALT 250 FOR IP): Performed by: FAMILY MEDICINE

## 2023-09-30 PROCEDURE — 6370000000 HC RX 637 (ALT 250 FOR IP): Performed by: HOSPITALIST

## 2023-09-30 PROCEDURE — 81001 URINALYSIS AUTO W/SCOPE: CPT

## 2023-09-30 PROCEDURE — G0378 HOSPITAL OBSERVATION PER HR: HCPCS

## 2023-09-30 PROCEDURE — 6360000002 HC RX W HCPCS: Performed by: FAMILY MEDICINE

## 2023-09-30 PROCEDURE — 36415 COLL VENOUS BLD VENIPUNCTURE: CPT

## 2023-09-30 PROCEDURE — 97535 SELF CARE MNGMENT TRAINING: CPT

## 2023-09-30 PROCEDURE — 99232 SBSQ HOSP IP/OBS MODERATE 35: CPT | Performed by: INTERNAL MEDICINE

## 2023-09-30 PROCEDURE — 85027 COMPLETE CBC AUTOMATED: CPT

## 2023-09-30 PROCEDURE — 94760 N-INVAS EAR/PLS OXIMETRY 1: CPT

## 2023-09-30 PROCEDURE — 6360000002 HC RX W HCPCS: Performed by: PHYSICIAN ASSISTANT

## 2023-09-30 PROCEDURE — 97165 OT EVAL LOW COMPLEX 30 MIN: CPT

## 2023-09-30 PROCEDURE — 97161 PT EVAL LOW COMPLEX 20 MIN: CPT

## 2023-09-30 PROCEDURE — 96376 TX/PRO/DX INJ SAME DRUG ADON: CPT

## 2023-09-30 PROCEDURE — 92610 EVALUATE SWALLOWING FUNCTION: CPT

## 2023-09-30 PROCEDURE — 97530 THERAPEUTIC ACTIVITIES: CPT

## 2023-09-30 PROCEDURE — 6370000000 HC RX 637 (ALT 250 FOR IP): Performed by: PHYSICIAN ASSISTANT

## 2023-09-30 PROCEDURE — 94640 AIRWAY INHALATION TREATMENT: CPT

## 2023-09-30 PROCEDURE — 80048 BASIC METABOLIC PNL TOTAL CA: CPT

## 2023-09-30 PROCEDURE — 6360000002 HC RX W HCPCS: Performed by: INTERNAL MEDICINE

## 2023-09-30 RX ORDER — CEFUROXIME AXETIL 250 MG/1
500 TABLET ORAL EVERY 12 HOURS SCHEDULED
Status: DISCONTINUED | OUTPATIENT
Start: 2023-09-30 | End: 2023-10-03

## 2023-09-30 RX ORDER — METOPROLOL SUCCINATE 25 MG/1
50 TABLET, EXTENDED RELEASE ORAL 2 TIMES DAILY
Status: DISCONTINUED | OUTPATIENT
Start: 2023-09-30 | End: 2023-10-03 | Stop reason: HOSPADM

## 2023-09-30 RX ADMIN — SODIUM CHLORIDE, PRESERVATIVE FREE 10 ML: 5 INJECTION INTRAVENOUS at 08:32

## 2023-09-30 RX ADMIN — PANTOPRAZOLE SODIUM 40 MG: 40 TABLET, DELAYED RELEASE ORAL at 15:42

## 2023-09-30 RX ADMIN — PRAVASTATIN SODIUM 10 MG: 20 TABLET ORAL at 08:32

## 2023-09-30 RX ADMIN — ARFORMOTEROL TARTRATE 15 MCG: 15 SOLUTION RESPIRATORY (INHALATION) at 20:22

## 2023-09-30 RX ADMIN — FUROSEMIDE 40 MG: 10 INJECTION, SOLUTION INTRAMUSCULAR; INTRAVENOUS at 18:10

## 2023-09-30 RX ADMIN — FUROSEMIDE 40 MG: 10 INJECTION, SOLUTION INTRAMUSCULAR; INTRAVENOUS at 08:32

## 2023-09-30 RX ADMIN — METHYLPREDNISOLONE SODIUM SUCCINATE 60 MG: 125 INJECTION, POWDER, FOR SOLUTION INTRAMUSCULAR; INTRAVENOUS at 13:47

## 2023-09-30 RX ADMIN — CEFUROXIME AXETIL 500 MG: 250 TABLET ORAL at 21:13

## 2023-09-30 RX ADMIN — PANTOPRAZOLE SODIUM 40 MG: 40 TABLET, DELAYED RELEASE ORAL at 05:40

## 2023-09-30 RX ADMIN — ARFORMOTEROL TARTRATE 15 MCG: 15 SOLUTION RESPIRATORY (INHALATION) at 07:30

## 2023-09-30 RX ADMIN — TIOTROPIUM BROMIDE INHALATION SPRAY 2 PUFF: 3.12 SPRAY, METERED RESPIRATORY (INHALATION) at 07:30

## 2023-09-30 RX ADMIN — METOPROLOL SUCCINATE 50 MG: 25 TABLET, FILM COATED, EXTENDED RELEASE ORAL at 21:13

## 2023-09-30 RX ADMIN — APIXABAN 5 MG: 5 TABLET, FILM COATED ORAL at 21:12

## 2023-09-30 RX ADMIN — APIXABAN 5 MG: 5 TABLET, FILM COATED ORAL at 08:32

## 2023-09-30 RX ADMIN — SODIUM CHLORIDE, PRESERVATIVE FREE 10 ML: 5 INJECTION INTRAVENOUS at 21:14

## 2023-09-30 RX ADMIN — EMPAGLIFLOZIN 10 MG: 10 TABLET, FILM COATED ORAL at 08:32

## 2023-09-30 RX ADMIN — METOPROLOL SUCCINATE 100 MG: 100 TABLET, EXTENDED RELEASE ORAL at 08:32

## 2023-09-30 RX ADMIN — METHYLPREDNISOLONE SODIUM SUCCINATE 60 MG: 125 INJECTION, POWDER, FOR SOLUTION INTRAMUSCULAR; INTRAVENOUS at 21:13

## 2023-09-30 RX ADMIN — SODIUM CHLORIDE, PRESERVATIVE FREE 10 ML: 5 INJECTION INTRAVENOUS at 08:33

## 2023-09-30 RX ADMIN — BUDESONIDE 500 MCG: 0.5 INHALANT RESPIRATORY (INHALATION) at 07:30

## 2023-09-30 RX ADMIN — BUDESONIDE 500 MCG: 0.5 INHALANT RESPIRATORY (INHALATION) at 20:22

## 2023-09-30 RX ADMIN — SPIRONOLACTONE 25 MG: 25 TABLET ORAL at 08:32

## 2023-09-30 NOTE — CONSULTS
[facility] Hospitalist Consult   Admit Date:  2023  1:03 PM   Name:  Ethan Flynn   Age:  52 y.o. Sex:  male  :  1970   MRN:  [id number]   Room:  [facility]    52 y.o. male with history of HFrEF 10-15% s/p AICD, NICM, LV thrombus on apixaban, HTN, COPD, former smoker, CKD3a, CHARISMA who presented from cardiology follow up visit with worsening SOB and peripheral edema. Seen in ED yesterday with NTproBNP 16,329 and R basilar airspace disease. He rec'd lasix 40 IV and Dc'd from ED. He was discharged 4 days ago with new LV thrombus treated with IV heparin then apixaban. He was also treated for aspiration PNA, MRSA screen negative, Bcx2 () NGTD, strep urine antigen negative. discharged home with augmentin. No cough, no rhinorrhea/wheezing. Has episodes where SOB is much worse at rest. No fever or chills. Said he thought he was doing fine after discharge. Does not recall being in the ED yesterday. Vitals: T 93.6F  /94 non hypoxic on 2 LPM NC   Labs: Ntprobnp 13,576, LA 4.1 albumin 2.1 WBC 7.3   CXR independently reviewed and shows persistent RLL airspace opacity     Today, off oxygen, feel better, make good UO, wife at bedside    Assessment & Plan:     Acute on chronic HFrEF, combined, ef 10%, hx of AICD . Off 2L oxygen- per primary. Improving     COPD exacerbation, mild    Questionable complicated uti in male, per UA, follow culture    Lactic acidosis, a/w hypoxia on admission   - no leukocytosis. Persistent RLL opacity also seen on CXR  when treated for aspiration PNA. RLL opacity could be r/t prior PNA and radiographic changes may lag behind clinical recovery of PNA. Bcx2 in process. Check procal, rapid covid, ABG. UA. Trend LA. Sepsis r/o. No sepsis bolus d/t advanced heart failure. Acute respiratory insuffiency - on 2 L NC.  Resolved     Hx of LV thrombus on apixaban, HTN , CKD3a - at baseline     Dispo; home per primary when improve    Objective:   Patient Vitals for the past 24

## 2023-09-30 NOTE — PROGRESS NOTES
ACUTE PHYSICAL THERAPY GOALS:   (Developed with and agreed upon by patient and/or caregiver.)  Pt will ambulate 500 ft under (S) without LOB or miss-steps and breaks as needed in 7 therapy sessions. Pt will ascend/ descend 10 stairs under (S) with use of rails PRN and no LOB or miss-steps in 7 therapy sessions. Pt will demonstrate teach-back proficiency of energy conservation and activity pacing techniques in 7 therapy sessions. PHYSICAL THERAPY Initial Assessment, Daily Note, and AM  (Link to Caseload Tracking: PT Visit Days : 1  Acknowledge Orders  Time In/Out  PT Charge Capture  Rehab Caseload Tracker    Kayce Shaw is a 52 y.o. male   PRIMARY DIAGNOSIS: Acute on chronic HFrEF (heart failure with reduced ejection fraction) ([facility])  Acute on chronic systolic heart failure ([address]) [I50.23]       Reason for Referral: Other abnormalities of gait and mobility (R26.89)  Observation: Payor: Yony Mendoza / Plan[place] [employer] [place] / Product Type: *No Product type* /     ASSESSMENT:     REHAB RECOMMENDATIONS:   Recommendation to date pending progress:  Setting:  Cardiac Rehab vs OP PT    Equipment:    To Be Determined     ASSESSMENT:  Mr. Jeffery Montano Is a 52 y.o. male presenting to PT after being admitted on 9/29/23 for acute on chronic HFrEF . At time of initial evaluation, pt presents at near-baseline LOF with regard to his functional mobility however his activity tolerance is notably diminished d/t significant recurrent SOB; both at rest and with activity. Today, pt performed all mobility at (S)/SB(A) level including ambulation of 500'x1 and standing ADLs at sink. Pt ambulating with slightly increased sway although he ultimately did well to avoid any LOB/ miss-steps. Furthermore, pt performed all activity on RA and denied any dizziness or lightheadedness while moving about however he exhibiting significantly labored breathing throughout duration of session.  SpO2 remained 95%(+) for all activity however pt HR remained elevaed in range comes first.    THERAPY PROGNOSIS: Fair    PROBLEM LIST:   (Skilled intervention is medically necessary to address:)  Decreased Activity Tolerance  Decreased Balance INTERVENTIONS PLANNED:   (Benefits and precautions of physical therapy have been discussed with the patient.)  Therapeutic Activity  Therapeutic Exercise/HEP  Education       TREATMENT:   EVALUATION: LOW COMPLEXITY: (Untimed Charge)    TREATMENT:   Therapeutic Activity (15 Minutes): Therapeutic activity included Rolling, Supine to Sit, Sit to Supine, Ambulation on level ground, Sitting balance , and Standing balance to improve functional Activity tolerance, Balance, and Mobility.     TREATMENT GRID:  N/A    AFTER TREATMENT PRECAUTIONS: Bed, Bed/Chair Locked, Call light within reach, Needs within reach, RN notified, and Visitors at bedside    INTERDISCIPLINARY COLLABORATION:  RN/ PCT, PT/ PTA, and OT/ DEY    EDUCATION: Education Given To: Patient  Education Provided: Role of Therapy    TIME IN/OUT:  Time In: [address]  Time Out: [address]   Minutes: [facility], PT

## 2023-09-30 NOTE — PROGRESS NOTES
No goals identified at this time. SPEECH LANGUAGE PATHOLOGY: DYSPHAGIA  Initial Assessment and Discharge    NAME: Ibis Welsh  : 1970  MRN: [id number]    ADMISSION DATE: 2023  PRIMARY DIAGNOSIS: Acute on chronic HFrEF (heart failure with reduced ejection fraction) (HCC)  Acute on chronic systolic heart failure (HCC) [I50.23]    ICD-10: Treatment Diagnosis: R13.12 Dysphagia, Oropharyngeal Phase    RECOMMENDATIONS   Diet:  Diet Solids Recommendation: Easy to Chew  Liquid Consistency Recommendation: Thin    Medications: PO     Compensatory Swallowing Strategies: Alternate solids and liquids;Small bites/sips;Upright as possible for all oral intake;Remain upright for 30-45 minutes after meals        Therapeutic Interventions: Patient/Family education   Patient continues to require skilled intervention:  No. Please re-consult if new concerns arise. ASSESSMENT    Dysphagia Diagnosis: Swallow function appears WFL  Dysphagia Impression : Tolerated any/all consistencies without overt s/sx this date. Adequate mastication and oral clearance with textures. Slight increase in RR with intake/talking to SLP. Did not appear to impact safe PO intake at this time. Recommend continue with current diet recommendations (Easy to Chew) to maximize intake with minimal exertion. GENERAL    History of Present Injury/Illness: Mr. Quin Dunham  has a past medical history of AICD (automatic cardioverter/defibrillator) present, COPD (chronic obstructive pulmonary disease) ([address]), HFrEF (heart failure with reduced ejection fraction) ([address]), Hypertension, and NICM (nonischemic cardiomyopathy) ([address]). . He also  has a past surgical history that includes ep device procedure (N/A, 2023) and Cardiac procedure (N/A, 2023). Subjective: c/o SOB with movement; reports no difficulty with swallowing or speech  Behavior/Cognition: Alert; Cooperative;Pleasant mood  Communication Observation: Functional  Follows Directions: Simple       Prior Dysphagia History: none noted. Recently seen by speech therapy 9/21/23 with recommendations for regular diet/thin liquids   Prior instrumental assessment: none    Current Diet : Easy to chew  Current Liquid Diet : Thin  Patient Complaint: did not like breakfast    Respiratory Status: Room air    O2 Device: None (Room air)        Pain: Patient does not c/o pain                  OBJECTIVE           Patient Positioning: Upright in bed   Oral Motor   Labial: No impairment  Dentition: Intact  Oral Hygiene: Moist  Lingual: No impairment  Dentition: Adequate        Baseline Vocal Quality: Normal    Oropharyngeal Phase: All              Pharyngeal Phase: WFL (slight increase in RR with intake)    Dysphagia Outcome and Severity Scale (CABRERA)  Dysphagia Outcome Severity Scale: Level 6: Within functional limits/Modified independence  Interpretation of Tool: The Dysphagia Outcome and Severity Scale (CABRERA) is a simple, easy-to-use, 7-point scale developed to systematically rate the functional severity of dysphagia based on objective assessment and make recommendations for diet level, independence level, and type of nutrition. Normal(7), Functional(6), Mild(5), Mild-Moderate(4), Moderate(3), Moderate-Severe(2), Severe(1)    PLAN    Duration/Frequency: No treatment indicated at this time    Education: Patient  Patient Education: SLP role, diet recommendations, role of breathing in relation to safe PO intake  Patient Education Response: Verbalizes understanding    PRECAUTIONS/ALLERGIES: Entresto [sacubitril-valsartan] and Lisinopril        Therapy Time  SLP Individual Minutes  Time In: 1445  Time Out: 0375  Minutes: [facility].  Geovanny Bonilla MS, CCC-SLP         Speech Language Pathologist          Acute Rehabilitation Services                   Contact: José Miguel Turner [facility]  9/30/2023 9:38 AM

## 2023-09-30 NOTE — PROGRESS NOTES
If you need to see a   -  just ask the nurse to call us. We look forward to serving your family!!         Julienne Mckeon

## 2023-09-30 NOTE — PROGRESS NOTES
Pt lactic acid resulted as 4.6. Asked lab to redraw lactic, which resulted as 4.6 again. This is increased from the previous result of 3.1. Pt asymptomatic. Terrie Van MD., MD stated no new orders necessary. Will continue to monitor pt and recheck lactic acid per protocol.

## 2023-10-01 PROBLEM — I50.23 ACUTE ON CHRONIC SYSTOLIC (CONGESTIVE) HEART FAILURE (HCC): Status: ACTIVE | Noted: 2023-10-01

## 2023-10-01 LAB
ANION GAP SERPL CALC-SCNC: 15 MMOL/L (ref 2–11)
APTT PPP: 34.5 SEC (ref 24.5–34.2)
APTT PPP: 69.1 SEC (ref 24.5–34.2)
APTT PPP: 90.9 SEC (ref 24.5–34.2)
BUN SERPL-MCNC: 32 MG/DL (ref 6–23)
CALCIUM SERPL-MCNC: 8.3 MG/DL (ref 8.3–10.4)
CHLORIDE SERPL-SCNC: 104 MMOL/L (ref 101–110)
CO2 SERPL-SCNC: 18 MMOL/L (ref 21–32)
CREAT SERPL-MCNC: 1.8 MG/DL (ref 0.8–1.5)
ERYTHROCYTE [DISTWIDTH] IN BLOOD BY AUTOMATED COUNT: 17.1 % (ref 11.9–14.6)
GLUCOSE SERPL-MCNC: 208 MG/DL (ref 65–100)
HCT VFR BLD AUTO: 40.9 % (ref 41.1–50.3)
HGB BLD-MCNC: 13.9 G/DL (ref 13.6–17.2)
INR PPP: 1.4
LACTATE SERPL-SCNC: 2.8 MMOL/L (ref 0.4–2)
LACTATE SERPL-SCNC: 3.1 MMOL/L (ref 0.4–2)
MCH RBC QN AUTO: 28.4 PG (ref 26.1–32.9)
MCHC RBC AUTO-ENTMCNC: 34 G/DL (ref 31.4–35)
MCV RBC AUTO: 83.5 FL (ref 82–102)
MM INDURATION, POC: 0 MM (ref 0–5)
NRBC # BLD: 0 K/UL (ref 0–0.2)
PLATELET # BLD AUTO: 433 K/UL (ref 150–450)
PMV BLD AUTO: 9.9 FL (ref 9.4–12.3)
POTASSIUM SERPL-SCNC: 3.7 MMOL/L (ref 3.5–5.1)
PPD, POC: NEGATIVE
PROTHROMBIN TIME: 17.8 SEC (ref 12.6–14.3)
RBC # BLD AUTO: 4.9 M/UL (ref 4.23–5.6)
SODIUM SERPL-SCNC: 137 MMOL/L (ref 133–143)
UFH PPP CHRO-ACNC: >1.1 IU/ML (ref 0.3–0.7)
WBC # BLD AUTO: 11.4 K/UL (ref 4.3–11.1)

## 2023-10-01 PROCEDURE — 85027 COMPLETE CBC AUTOMATED: CPT

## 2023-10-01 PROCEDURE — 6370000000 HC RX 637 (ALT 250 FOR IP): Performed by: PHYSICIAN ASSISTANT

## 2023-10-01 PROCEDURE — 6360000002 HC RX W HCPCS: Performed by: FAMILY MEDICINE

## 2023-10-01 PROCEDURE — 94640 AIRWAY INHALATION TREATMENT: CPT

## 2023-10-01 PROCEDURE — 6370000000 HC RX 637 (ALT 250 FOR IP): Performed by: INTERNAL MEDICINE

## 2023-10-01 PROCEDURE — 6360000002 HC RX W HCPCS: Performed by: INTERNAL MEDICINE

## 2023-10-01 PROCEDURE — 6370000000 HC RX 637 (ALT 250 FOR IP): Performed by: HOSPITALIST

## 2023-10-01 PROCEDURE — 80048 BASIC METABOLIC PNL TOTAL CA: CPT

## 2023-10-01 PROCEDURE — 96366 THER/PROPH/DIAG IV INF ADDON: CPT

## 2023-10-01 PROCEDURE — 94760 N-INVAS EAR/PLS OXIMETRY 1: CPT

## 2023-10-01 PROCEDURE — 83605 ASSAY OF LACTIC ACID: CPT

## 2023-10-01 PROCEDURE — 6360000002 HC RX W HCPCS: Performed by: PHYSICIAN ASSISTANT

## 2023-10-01 PROCEDURE — 99232 SBSQ HOSP IP/OBS MODERATE 35: CPT | Performed by: INTERNAL MEDICINE

## 2023-10-01 PROCEDURE — 85610 PROTHROMBIN TIME: CPT

## 2023-10-01 PROCEDURE — 85730 THROMBOPLASTIN TIME PARTIAL: CPT

## 2023-10-01 PROCEDURE — 2500000003 HC RX 250 WO HCPCS: Performed by: INTERNAL MEDICINE

## 2023-10-01 PROCEDURE — 2580000003 HC RX 258: Performed by: FAMILY MEDICINE

## 2023-10-01 PROCEDURE — 1100000003 HC PRIVATE W/ TELEMETRY

## 2023-10-01 PROCEDURE — 85520 HEPARIN ASSAY: CPT

## 2023-10-01 PROCEDURE — 96365 THER/PROPH/DIAG IV INF INIT: CPT

## 2023-10-01 PROCEDURE — 36415 COLL VENOUS BLD VENIPUNCTURE: CPT

## 2023-10-01 PROCEDURE — 2580000003 HC RX 258: Performed by: PHYSICIAN ASSISTANT

## 2023-10-01 PROCEDURE — 96376 TX/PRO/DX INJ SAME DRUG ADON: CPT

## 2023-10-01 RX ORDER — HEPARIN SODIUM 1000 [USP'U]/ML
80 INJECTION, SOLUTION INTRAVENOUS; SUBCUTANEOUS PRN
Status: DISCONTINUED | OUTPATIENT
Start: 2023-10-01 | End: 2023-10-03 | Stop reason: ALTCHOICE

## 2023-10-01 RX ORDER — HEPARIN SODIUM 1000 [USP'U]/ML
80 INJECTION, SOLUTION INTRAVENOUS; SUBCUTANEOUS ONCE
Status: COMPLETED | OUTPATIENT
Start: 2023-10-01 | End: 2023-10-01

## 2023-10-01 RX ORDER — HEPARIN SODIUM 10000 [USP'U]/100ML
5-30 INJECTION, SOLUTION INTRAVENOUS CONTINUOUS
Status: DISCONTINUED | OUTPATIENT
Start: 2023-10-01 | End: 2023-10-01

## 2023-10-01 RX ORDER — HEPARIN SODIUM 1000 [USP'U]/ML
30 INJECTION, SOLUTION INTRAVENOUS; SUBCUTANEOUS PRN
Status: DISCONTINUED | OUTPATIENT
Start: 2023-10-01 | End: 2023-10-01

## 2023-10-01 RX ORDER — HEPARIN SODIUM 1000 [USP'U]/ML
40 INJECTION, SOLUTION INTRAVENOUS; SUBCUTANEOUS PRN
Status: DISCONTINUED | OUTPATIENT
Start: 2023-10-01 | End: 2023-10-03 | Stop reason: ALTCHOICE

## 2023-10-01 RX ORDER — HEPARIN SODIUM 1000 [USP'U]/ML
60 INJECTION, SOLUTION INTRAVENOUS; SUBCUTANEOUS ONCE
Status: DISCONTINUED | OUTPATIENT
Start: 2023-10-01 | End: 2023-10-01

## 2023-10-01 RX ORDER — HEPARIN SODIUM 10000 [USP'U]/100ML
5-30 INJECTION, SOLUTION INTRAVENOUS CONTINUOUS
Status: DISCONTINUED | OUTPATIENT
Start: 2023-10-01 | End: 2023-10-02

## 2023-10-01 RX ADMIN — CEFUROXIME AXETIL 500 MG: 250 TABLET ORAL at 20:38

## 2023-10-01 RX ADMIN — ARFORMOTEROL TARTRATE 15 MCG: 15 SOLUTION RESPIRATORY (INHALATION) at 19:46

## 2023-10-01 RX ADMIN — BUDESONIDE 500 MCG: 0.5 INHALANT RESPIRATORY (INHALATION) at 19:46

## 2023-10-01 RX ADMIN — SPIRONOLACTONE 25 MG: 25 TABLET ORAL at 09:24

## 2023-10-01 RX ADMIN — PANTOPRAZOLE SODIUM 40 MG: 40 TABLET, DELAYED RELEASE ORAL at 05:24

## 2023-10-01 RX ADMIN — IVABRADINE 5 MG: 5 TABLET, FILM COATED ORAL at 09:31

## 2023-10-01 RX ADMIN — METHYLPREDNISOLONE SODIUM SUCCINATE 60 MG: 125 INJECTION, POWDER, FOR SOLUTION INTRAMUSCULAR; INTRAVENOUS at 14:47

## 2023-10-01 RX ADMIN — TIOTROPIUM BROMIDE INHALATION SPRAY 2 PUFF: 3.12 SPRAY, METERED RESPIRATORY (INHALATION) at 07:28

## 2023-10-01 RX ADMIN — LEVALBUTEROL HYDROCHLORIDE 1.25 MG: 1.25 SOLUTION RESPIRATORY (INHALATION) at 07:28

## 2023-10-01 RX ADMIN — PRAVASTATIN SODIUM 10 MG: 20 TABLET ORAL at 09:24

## 2023-10-01 RX ADMIN — BUDESONIDE 500 MCG: 0.5 INHALANT RESPIRATORY (INHALATION) at 07:28

## 2023-10-01 RX ADMIN — METHYLPREDNISOLONE SODIUM SUCCINATE 60 MG: 125 INJECTION, POWDER, FOR SOLUTION INTRAMUSCULAR; INTRAVENOUS at 05:23

## 2023-10-01 RX ADMIN — IVABRADINE 5 MG: 5 TABLET, FILM COATED ORAL at 17:31

## 2023-10-01 RX ADMIN — EMPAGLIFLOZIN 10 MG: 10 TABLET, FILM COATED ORAL at 09:23

## 2023-10-01 RX ADMIN — SODIUM CHLORIDE, PRESERVATIVE FREE 10 ML: 5 INJECTION INTRAVENOUS at 09:32

## 2023-10-01 RX ADMIN — CEFUROXIME AXETIL 500 MG: 250 TABLET ORAL at 09:23

## 2023-10-01 RX ADMIN — FUROSEMIDE 40 MG: 10 INJECTION, SOLUTION INTRAMUSCULAR; INTRAVENOUS at 09:25

## 2023-10-01 RX ADMIN — FUROSEMIDE 40 MG: 10 INJECTION, SOLUTION INTRAMUSCULAR; INTRAVENOUS at 17:32

## 2023-10-01 RX ADMIN — METOPROLOL SUCCINATE 50 MG: 25 TABLET, FILM COATED, EXTENDED RELEASE ORAL at 20:38

## 2023-10-01 RX ADMIN — HEPARIN SODIUM 5120 UNITS: 1000 INJECTION INTRAVENOUS; SUBCUTANEOUS at 09:45

## 2023-10-01 RX ADMIN — HEPARIN SODIUM 18 UNITS/KG/HR: 10000 INJECTION, SOLUTION INTRAVENOUS at 09:40

## 2023-10-01 RX ADMIN — METHYLPREDNISOLONE SODIUM SUCCINATE 60 MG: 125 INJECTION, POWDER, FOR SOLUTION INTRAMUSCULAR; INTRAVENOUS at 20:38

## 2023-10-01 RX ADMIN — PANTOPRAZOLE SODIUM 40 MG: 40 TABLET, DELAYED RELEASE ORAL at 17:30

## 2023-10-01 RX ADMIN — METOPROLOL SUCCINATE 50 MG: 25 TABLET, FILM COATED, EXTENDED RELEASE ORAL at 09:24

## 2023-10-01 RX ADMIN — SODIUM CHLORIDE, PRESERVATIVE FREE 10 ML: 5 INJECTION INTRAVENOUS at 20:38

## 2023-10-01 NOTE — CONSULTS
[facility] Hospitalist Consult   Admit Date:  2023  1:03 PM   Name:  Yolande Jones   Age:  52 y.o. Sex:  male  :  1970   MRN:  [id number]   Room:      52 y.o. male with history of HFrEF 10-15% s/p AICD, NICM, LV thrombus on apixaban, HTN, COPD, former smoker, CKD3a, CHARISMA who presented from cardiology follow up visit with worsening SOB and peripheral edema. Seen in ED yesterday with NTproBNP 16,329 and R basilar airspace disease. He rec'd lasix 40 IV and Dc'd from ED. He was discharged 4 days ago with new LV thrombus treated with IV heparin then apixaban. He was also treated for aspiration PNA, MRSA screen negative, Bcx2 () NGTD, strep urine antigen negative. discharged home with augmentin. No cough, no rhinorrhea/wheezing. Has episodes where SOB is much worse at rest. No fever or chills. Said he thought he was doing fine after discharge. Does not recall being in the ED yesterday. Vitals: T 93.6F  /94 non hypoxic on 2 LPM NC   Labs: Ntprobnp 13,576, LA 4.1 albumin 2.1 WBC 7.3   CXR independently reviewed and shows persistent RLL airspace opacity     Today, off oxygen, feel and look better, make good UO, wife at bedside    Assessment & Plan:     Acute on chronic HFrEF, combined, ef 10%, hx of AICD . IO negative . 1L. Off 2L oxygen- per primary. Improving     COPD exacerbation, mild, improving  On steroid/ BD    Questionable complicated uti in male, per UA, follow culture    Lactic acidosis, a/w hypoxia on admission, improved   - no leukocytosis. Persistent RLL opacity also seen on CXR  when treated for aspiration PNA. RLL opacity could be r/t prior PNA and radiographic changes may lag behind clinical recovery of PNA. Bcx2 in process. Check procal, rapid covid, ABG. UA. Trend LA. Sepsis r/o. No sepsis bolus d/t advanced heart failure. Acute respiratory insuffiency - on 2 L NC.  Resolved     Hx of LV thrombus on apixaban, HTN , CKD3a - at baseline     Dispo; home per primary

## 2023-10-01 NOTE — PROGRESS NOTES
Reviewed Plan of care for next 24 hours. Pt understands NPO at midnight for Cath on Monday. Pt understands need for Heparin and blood work. Pt understands he may be transported to [facility]- [address] [place] [address] and family aware and looking for possible transportation for themselves. Will monitor as needed.

## 2023-10-02 ENCOUNTER — APPOINTMENT (OUTPATIENT)
Dept: GENERAL RADIOLOGY | Age: 53
DRG: 286 | End: 2023-10-02
Attending: INTERNAL MEDICINE
Payer: COMMERCIAL

## 2023-10-02 LAB
ANION GAP SERPL CALC-SCNC: 9 MMOL/L (ref 2–11)
APTT PPP: >200 SEC (ref 24.5–34.2)
APTT PPP: >200 SEC (ref 24.5–34.2)
BUN SERPL-MCNC: 35 MG/DL (ref 6–23)
CALCIUM SERPL-MCNC: 8.1 MG/DL (ref 8.3–10.4)
CHLORIDE SERPL-SCNC: 109 MMOL/L (ref 101–110)
CO2 SERPL-SCNC: 23 MMOL/L (ref 21–32)
CREAT SERPL-MCNC: 1.5 MG/DL (ref 0.8–1.5)
ECHO BSA: 1.74 M2
GLUCOSE SERPL-MCNC: 152 MG/DL (ref 65–100)
MAGNESIUM SERPL-MCNC: 1.9 MG/DL (ref 1.8–2.4)
POTASSIUM SERPL-SCNC: 3.7 MMOL/L (ref 3.5–5.1)
SODIUM SERPL-SCNC: 141 MMOL/L (ref 133–143)
UFH PPP CHRO-ACNC: 0.88 IU/ML (ref 0.3–0.7)

## 2023-10-02 PROCEDURE — 6360000002 HC RX W HCPCS: Performed by: PHYSICIAN ASSISTANT

## 2023-10-02 PROCEDURE — 1100000003 HC PRIVATE W/ TELEMETRY

## 2023-10-02 PROCEDURE — C1751 CATH, INF, PER/CENT/MIDLINE: HCPCS | Performed by: INTERNAL MEDICINE

## 2023-10-02 PROCEDURE — 4A023N6 MEASUREMENT OF CARDIAC SAMPLING AND PRESSURE, RIGHT HEART, PERCUTANEOUS APPROACH: ICD-10-PCS | Performed by: INTERNAL MEDICINE

## 2023-10-02 PROCEDURE — 2500000003 HC RX 250 WO HCPCS: Performed by: INTERNAL MEDICINE

## 2023-10-02 PROCEDURE — 2709999900 HC NON-CHARGEABLE SUPPLY: Performed by: INTERNAL MEDICINE

## 2023-10-02 PROCEDURE — 74018 RADEX ABDOMEN 1 VIEW: CPT

## 2023-10-02 PROCEDURE — 97530 THERAPEUTIC ACTIVITIES: CPT

## 2023-10-02 PROCEDURE — 94640 AIRWAY INHALATION TREATMENT: CPT

## 2023-10-02 PROCEDURE — 6360000002 HC RX W HCPCS: Performed by: INTERNAL MEDICINE

## 2023-10-02 PROCEDURE — 85520 HEPARIN ASSAY: CPT

## 2023-10-02 PROCEDURE — 6360000002 HC RX W HCPCS: Performed by: NURSE PRACTITIONER

## 2023-10-02 PROCEDURE — 6370000000 HC RX 637 (ALT 250 FOR IP): Performed by: HOSPITALIST

## 2023-10-02 PROCEDURE — 36415 COLL VENOUS BLD VENIPUNCTURE: CPT

## 2023-10-02 PROCEDURE — 94760 N-INVAS EAR/PLS OXIMETRY 1: CPT

## 2023-10-02 PROCEDURE — 2580000003 HC RX 258: Performed by: PHYSICIAN ASSISTANT

## 2023-10-02 PROCEDURE — 6370000000 HC RX 637 (ALT 250 FOR IP): Performed by: PHYSICIAN ASSISTANT

## 2023-10-02 PROCEDURE — 6360000002 HC RX W HCPCS: Performed by: FAMILY MEDICINE

## 2023-10-02 PROCEDURE — 93451 RIGHT HEART CATH: CPT | Performed by: INTERNAL MEDICINE

## 2023-10-02 PROCEDURE — 2700000000 HC OXYGEN THERAPY PER DAY

## 2023-10-02 PROCEDURE — 85730 THROMBOPLASTIN TIME PARTIAL: CPT

## 2023-10-02 PROCEDURE — B2151ZZ FLUOROSCOPY OF LEFT HEART USING LOW OSMOLAR CONTRAST: ICD-10-PCS | Performed by: INTERNAL MEDICINE

## 2023-10-02 PROCEDURE — 83735 ASSAY OF MAGNESIUM: CPT

## 2023-10-02 PROCEDURE — 2580000003 HC RX 258: Performed by: NURSE PRACTITIONER

## 2023-10-02 PROCEDURE — 99232 SBSQ HOSP IP/OBS MODERATE 35: CPT | Performed by: INTERNAL MEDICINE

## 2023-10-02 PROCEDURE — 6370000000 HC RX 637 (ALT 250 FOR IP): Performed by: INTERNAL MEDICINE

## 2023-10-02 PROCEDURE — 6370000000 HC RX 637 (ALT 250 FOR IP): Performed by: NURSE PRACTITIONER

## 2023-10-02 PROCEDURE — 94761 N-INVAS EAR/PLS OXIMETRY MLT: CPT

## 2023-10-02 PROCEDURE — B2111ZZ FLUOROSCOPY OF MULTIPLE CORONARY ARTERIES USING LOW OSMOLAR CONTRAST: ICD-10-PCS | Performed by: INTERNAL MEDICINE

## 2023-10-02 PROCEDURE — 80048 BASIC METABOLIC PNL TOTAL CA: CPT

## 2023-10-02 PROCEDURE — C1894 INTRO/SHEATH, NON-LASER: HCPCS | Performed by: INTERNAL MEDICINE

## 2023-10-02 RX ORDER — FUROSEMIDE 10 MG/ML
40 INJECTION INTRAMUSCULAR; INTRAVENOUS ONCE
Status: COMPLETED | OUTPATIENT
Start: 2023-10-02 | End: 2023-10-02

## 2023-10-02 RX ORDER — POTASSIUM CHLORIDE 20 MEQ/1
40 TABLET, EXTENDED RELEASE ORAL ONCE
Status: COMPLETED | OUTPATIENT
Start: 2023-10-02 | End: 2023-10-02

## 2023-10-02 RX ORDER — MAGNESIUM SULFATE IN WATER 40 MG/ML
2000 INJECTION, SOLUTION INTRAVENOUS ONCE
Status: COMPLETED | OUTPATIENT
Start: 2023-10-02 | End: 2023-10-02

## 2023-10-02 RX ADMIN — PANTOPRAZOLE SODIUM 40 MG: 40 TABLET, DELAYED RELEASE ORAL at 05:00

## 2023-10-02 RX ADMIN — CEFUROXIME AXETIL 500 MG: 250 TABLET ORAL at 20:45

## 2023-10-02 RX ADMIN — TIOTROPIUM BROMIDE INHALATION SPRAY 2 PUFF: 3.12 SPRAY, METERED RESPIRATORY (INHALATION) at 08:51

## 2023-10-02 RX ADMIN — APIXABAN 5 MG: 5 TABLET, FILM COATED ORAL at 20:44

## 2023-10-02 RX ADMIN — POTASSIUM CHLORIDE 40 MEQ: 1500 TABLET, EXTENDED RELEASE ORAL at 05:00

## 2023-10-02 RX ADMIN — SPIRONOLACTONE 25 MG: 25 TABLET ORAL at 08:02

## 2023-10-02 RX ADMIN — FUROSEMIDE 40 MG: 10 INJECTION, SOLUTION INTRAMUSCULAR; INTRAVENOUS at 12:51

## 2023-10-02 RX ADMIN — AMIODARONE HYDROCHLORIDE 1 MG/MIN: 50 INJECTION, SOLUTION INTRAVENOUS at 08:01

## 2023-10-02 RX ADMIN — SODIUM CHLORIDE, PRESERVATIVE FREE 10 ML: 5 INJECTION INTRAVENOUS at 08:02

## 2023-10-02 RX ADMIN — AMIODARONE HYDROCHLORIDE 1 MG/MIN: 50 INJECTION, SOLUTION INTRAVENOUS at 17:55

## 2023-10-02 RX ADMIN — METHYLPREDNISOLONE SODIUM SUCCINATE 60 MG: 125 INJECTION, POWDER, FOR SOLUTION INTRAMUSCULAR; INTRAVENOUS at 05:00

## 2023-10-02 RX ADMIN — HEPARIN SODIUM 20 UNITS/KG/HR: 10000 INJECTION, SOLUTION INTRAVENOUS at 05:28

## 2023-10-02 RX ADMIN — MAGNESIUM SULFATE HEPTAHYDRATE 2000 MG: 40 INJECTION, SOLUTION INTRAVENOUS at 05:00

## 2023-10-02 RX ADMIN — METOPROLOL SUCCINATE 50 MG: 25 TABLET, FILM COATED, EXTENDED RELEASE ORAL at 20:45

## 2023-10-02 RX ADMIN — CEFUROXIME AXETIL 500 MG: 250 TABLET ORAL at 08:16

## 2023-10-02 RX ADMIN — AMIODARONE HYDROCHLORIDE 1 MG/MIN: 50 INJECTION, SOLUTION INTRAVENOUS at 01:05

## 2023-10-02 RX ADMIN — PANTOPRAZOLE SODIUM 40 MG: 40 TABLET, DELAYED RELEASE ORAL at 15:58

## 2023-10-02 RX ADMIN — HEPARIN SODIUM 2560 UNITS: 1000 INJECTION INTRAVENOUS; SUBCUTANEOUS at 00:16

## 2023-10-02 RX ADMIN — BUDESONIDE 500 MCG: 0.5 INHALANT RESPIRATORY (INHALATION) at 20:05

## 2023-10-02 RX ADMIN — IVABRADINE 5 MG: 5 TABLET, FILM COATED ORAL at 17:05

## 2023-10-02 RX ADMIN — EMPAGLIFLOZIN 10 MG: 10 TABLET, FILM COATED ORAL at 08:02

## 2023-10-02 RX ADMIN — FUROSEMIDE 40 MG: 10 INJECTION, SOLUTION INTRAMUSCULAR; INTRAVENOUS at 17:05

## 2023-10-02 RX ADMIN — METHYLPREDNISOLONE SODIUM SUCCINATE 60 MG: 125 INJECTION, POWDER, FOR SOLUTION INTRAMUSCULAR; INTRAVENOUS at 12:51

## 2023-10-02 RX ADMIN — HEPARIN SODIUM 20 UNITS/KG/HR: 10000 INJECTION, SOLUTION INTRAVENOUS at 07:41

## 2023-10-02 RX ADMIN — SODIUM CHLORIDE, PRESERVATIVE FREE 10 ML: 5 INJECTION INTRAVENOUS at 20:49

## 2023-10-02 RX ADMIN — PRAVASTATIN SODIUM 10 MG: 20 TABLET ORAL at 08:01

## 2023-10-02 RX ADMIN — METOPROLOL SUCCINATE 50 MG: 25 TABLET, FILM COATED, EXTENDED RELEASE ORAL at 08:02

## 2023-10-02 RX ADMIN — IVABRADINE 5 MG: 5 TABLET, FILM COATED ORAL at 08:01

## 2023-10-02 RX ADMIN — BUDESONIDE 500 MCG: 0.5 INHALANT RESPIRATORY (INHALATION) at 08:51

## 2023-10-02 RX ADMIN — ARFORMOTEROL TARTRATE 15 MCG: 15 SOLUTION RESPIRATORY (INHALATION) at 20:05

## 2023-10-02 ASSESSMENT — PAIN SCALES - GENERAL
PAINLEVEL_OUTOF10: 0

## 2023-10-02 NOTE — CARE COORDINATION
CM reviewed clinical record. Patient with end stage heart failure. RHC today. Further plans when results obtained. CM following.

## 2023-10-02 NOTE — PROGRESS NOTES
[facility] RtBr with Dr Vicky Bertrand. Noted CHF, RtBr sheath removed, pressure dressing in place. Report to receiving RN.   Pt transferred back to [facility]

## 2023-10-02 NOTE — PROGRESS NOTES
ACUTE PHYSICAL THERAPY GOALS:   (Developed with and agreed upon by patient and/or caregiver.)  Pt will ambulate 500 ft under (S) without LOB or miss-steps and breaks as needed in 7 therapy sessions. Pt will ascend/ descend 10 stairs under (S) with use of rails PRN and no LOB or miss-steps in 7 therapy sessions. Pt will demonstrate teach-back proficiency of energy conservation and activity pacing techniques in 7 therapy sessions. PHYSICAL THERAPY: Daily Note PM   (Link to Caseload Tracking: PT Visit Days : 2  Time In/Out PT Charge Capture  Rehab Caseload Tracker  Orders    Estela Chery is a 52 y.o. male   PRIMARY DIAGNOSIS: Acute on chronic HFrEF (heart failure with reduced ejection fraction) (HCC)  Acute on chronic systolic heart failure (HCC) [I50.23]  Acute on chronic systolic (congestive) heart failure (HCC) [I50.23]  Procedure(s) (LRB):  Right heart cath (N/A)  Day of Surgery  Inpatient: Payor: Ronny Concepcion / Plan: Roddy Doll / Product Type: *No Product type* /     ASSESSMENT:     REHAB RECOMMENDATIONS:   Recommendation to date pending progress:  Setting:  Cardiac Rehab vs OP PT    Equipment:    To Be Determined     ASSESSMENT:  Mr. Mary Celestin was supine in bed on arrival. He is waiting to go down for a heart cath today. Ambulated significantly less distance today. He was very short of breath but unable to obtain a SpO2. Pt returned to room and left supine with needs in reach and nursing staff in room.       SUBJECTIVE:   Mr. Mary Celestin states, \"I don't know why I'm so short of breath today\"     Social/Functional Lives With: Spouse  Type of Home: House  Home Layout: One level  Bathroom Shower/Tub: Tub/Shower unit  Bathroom Toilet: Standard  Bathroom Equipment: Commode  Bathroom Accessibility: Accessible  Home Equipment: None  Receives Help From: Family  ADL Assistance: Independent  Homemaking Assistance: Independent  Ambulation Assistance: Independent  Transfer Assistance: Independent  Active : Yes  Mode of Ambulation on level ground, Sitting balance , and Standing balance to improve functional Activity tolerance, Balance, and Mobility.     TREATMENT GRID:  N/A    AFTER TREATMENT PRECAUTIONS: Bed, Bed/Chair Locked, Call light within reach, and Needs within reach    INTERDISCIPLINARY COLLABORATION:  RN/ PCT    EDUCATION:      TIME IN/OUT:  Time In: 1315  Time Out: 120 [facility]  Minutes: 2860 [facility]

## 2023-10-02 NOTE — PROGRESS NOTES
Physician Progress Note      PATIENT:               Catherine Stafford  CSN #:                  [id number]  :                       1970  ADMIT DATE:       2023 1:03 PM  [address] DATE:  RESPONDING  PROVIDER #:        Shai Sands MD          QUERY TEXT:    Patient admitted with CHF exacerbation, noted to have atrial fibrillation and   is maintained on eliquis. If possible, please document in progress notes and   discharge summary if you are evaluating and/or treating any of the following: The medical record reflects the following:  Risk Factors: CHF, HTN  Clinical Indicators: Atrial fibrillation  Treatment: Eliquis    Thank you,  Aguila Machado RN, BSN, CDI  [email or web address]  . Options provided:  -- Secondary hypercoagulable state in a patient with atrial fibrillation  -- Other - I will add my own diagnosis  -- Disagree - Not applicable / Not valid  -- Disagree - Clinically unable to determine / Unknown  -- Refer to Clinical Documentation Reviewer    PROVIDER RESPONSE TEXT:    This patient has secondary hypercoagulable state in a patient with atrial   fibrillation.     Query created by: Aguila Machado on 10/2/2023 2:02 PM      Electronically signed by:  Shai Sands MD 10/2/2023 2:05 PM

## 2023-10-02 NOTE — PROGRESS NOTES
Hospitalist Progress Note   Admit Date:  2023  1:03 PM   Name:  Patrica Coburn   Age:  52 y.o. Sex:  male  :  1970   MRN:  [id number]   Room:  [facility]/    Presenting/Chief Complaint: No chief complaint on file. Reason(s) for Admission: Acute on chronic systolic heart failure (HCC) [I50.23]  Acute on chronic systolic (congestive) heart failure [facility]) [I50.23]     Hospital Course:   Patrica Coburn is a 52 y.o. male with medical history of  CHFrEF 10-15% s/p AICD, NICM, LV thrombus on apixaban, HTN, COPD, former smoker, Jorge Luis Holter who presented from cardiology follow up visit with worsening SOB and peripheral edema. Seen in ED yesterday with NTproBNP 16,329 and R basilar airspace disease. He rec'd lasix 40 IV and Dc'd from ED. He was discharged 4 days ago with new LV thrombus treated with IV heparin then apixaban. He was also treated for aspiration PNA, MRSA screen negative, Bcx2 () NGTD, strep urine antigen negative. discharged home with augmentin. No cough, no rhinorrhea/wheezing. Has episodes where SOB is much worse at rest. No fever or chills. Said he thought he was doing fine after discharge. Does not recall being in the ED yesterday. Vitals: T 93.6F  /94 non hypoxic on 2 LPM NC   Labs: Ntprobnp 13,576, LA 4.1 albumin 2.1 WBC 7.3   CXR independently reviewed and shows persistent RLL airspace opacity       Subjective & 24hr Events: 10  Patient states he feels short of breath. No fever no chills. No chest pain. No nausea no vomiting. Assessment & Plan: This is a 78-year-old male with    Acute on chronic HFrEF, combined, ef 10%, hx of AICD . IO negative . 1L. Off 2L oxygen- per primary. Improving   Cardiology primary. Plans for right heart catheterization today. Low threshold to transfer to [facility] depending on findings. COPD exacerbation, mild, improving  Solu-Medrol weaned to 40 mg every 12 hours.      Questionable complicated UTI in male:  Urinalysis suggestive 2. 0 MMOL/L   APTT    Collection Time: 10/01/23  9:00 AM   Result Value Ref Range    PTT 34.5 (H) 24.5 - 34.2 SEC   Protime-INR    Collection Time: 10/01/23  9:00 AM   Result Value Ref Range    Protime 17.8 (H) 12.6 - 14.3 sec    INR 1.4     Anti-Xa, Unfractionated Heparin    Collection Time: 10/01/23  9:00 AM   Result Value Ref Range    Anti-XA Unfrac Heparin >1.1 (H) 0.3 - 0.7 IU/mL   CBC    Collection Time: 10/01/23  9:00 AM   Result Value Ref Range    WBC 11.4 (H) 4.3 - 11.1 K/uL    RBC 4.90 4.23 - 5.6 M/uL    Hemoglobin 13.9 13.6 - 17.2 g/dL    Hematocrit 40.9 (L) 41.1 - 50.3 %    MCV 83.5 82 - 102 FL    MCH 28.4 26.1 - 32.9 PG    MCHC 34.0 31.4 - 35.0 g/dL    RDW 17.1 (H) 11.9 - 14.6 %    Platelets 306 082 - 985 K/uL    MPV 9.9 9.4 - 12.3 FL    nRBC 0.00 0.0 - 0.2 K/uL   Basic Metabolic Panel    Collection Time: 10/01/23  9:00 AM   Result Value Ref Range    Sodium 137 133 - 143 mmol/L    Potassium 3.7 3.5 - 5.1 mmol/L    Chloride 104 101 - 110 mmol/L    CO2 18 (L) 21 - 32 mmol/L    Anion Gap 15 (H) 2 - 11 mmol/L    Glucose 208 (H) 65 - 100 mg/dL    BUN 32 (H) 6 - 23 MG/DL    Creatinine 1.80 (H) 0.8 - 1.5 MG/DL    Est, Glom Filt Rate 44 (L) >60 ml/min/1.73m2    Calcium 8.3 8.3 - 10.4 MG/DL   APTT    Collection Time: 10/01/23  3:45 PM   Result Value Ref Range    PTT 90.9 (H) 24.5 - 34.2 SEC   APTT    Collection Time: 10/01/23  9:20 PM   Result Value Ref Range    PTT 69.1 (H) 24.5 - 34.2 SEC   Basic Metabolic Panel    Collection Time: 10/02/23 12:29 AM   Result Value Ref Range    Sodium 141 133 - 143 mmol/L    Potassium 3.7 3.5 - 5.1 mmol/L    Chloride 109 101 - 110 mmol/L    CO2 23 21 - 32 mmol/L    Anion Gap 9 2 - 11 mmol/L    Glucose 152 (H) 65 - 100 mg/dL    BUN 35 (H) 6 - 23 MG/DL    Creatinine 1.50 0.8 - 1.5 MG/DL    Est, Glom Filt Rate 55 (L) >60 ml/min/1.73m2    Calcium 8.1 (L) 8.3 - 10.4 MG/DL   Magnesium    Collection Time: 10/02/23 12:29 AM   Result Value Ref Range    Magnesium 1.9 1.8 - 2.4 mg/dL

## 2023-10-02 NOTE — PROGRESS NOTES
Spoke with Vinny Kothari from pharmacy to confirm heparin gtt dosing. Pt PTT resulted greater than 200. Heparin gtt will be held for 1 hour and rate will be decreased by 3 units/kg/hr from 20 to 17. Vinny Kothari confirmed dosing.

## 2023-10-02 NOTE — PROGRESS NOTES
TRANSFER - IN REPORT:    Verbal report received from [facility] on Hermnesha Guzman  being received from Cath lab for routine progression of patient care      Report consisted of patient's Situation, Background, Assessment and   Recommendations(SBAR). Information from the following report(s) Nurse Handoff Report was reviewed with the receiving nurse. Opportunity for questions and clarification was provided. Assessment completed upon patient's arrival to unit and care assumed.

## 2023-10-03 ENCOUNTER — APPOINTMENT (OUTPATIENT)
Dept: GENERAL RADIOLOGY | Age: 53
DRG: 286 | End: 2023-10-03
Attending: INTERNAL MEDICINE
Payer: COMMERCIAL

## 2023-10-03 VITALS
RESPIRATION RATE: 26 BRPM | HEART RATE: 64 BPM | DIASTOLIC BLOOD PRESSURE: 69 MMHG | SYSTOLIC BLOOD PRESSURE: 98 MMHG | OXYGEN SATURATION: 98 % | BODY MASS INDEX: 21.9 KG/M2 | WEIGHT: 139.5 LBS | TEMPERATURE: 97.6 F | HEIGHT: 67 IN

## 2023-10-03 LAB
ANION GAP SERPL CALC-SCNC: 17 MMOL/L (ref 2–11)
ANION GAP SERPL CALC-SCNC: 20 MMOL/L (ref 2–11)
ARTERIAL PATENCY WRIST A: POSITIVE
BASE DEFICIT BLD-SCNC: 15.7 MMOL/L
BDY SITE: ABNORMAL
BUN SERPL-MCNC: 40 MG/DL (ref 6–23)
BUN SERPL-MCNC: 44 MG/DL (ref 6–23)
CA-I BLD-MCNC: 1.18 MMOL/L (ref 1.12–1.32)
CALCIUM SERPL-MCNC: 8.9 MG/DL (ref 8.3–10.4)
CALCIUM SERPL-MCNC: 9 MG/DL (ref 8.3–10.4)
CHLORIDE SERPL-SCNC: 107 MMOL/L (ref 101–110)
CHLORIDE SERPL-SCNC: 107 MMOL/L (ref 101–110)
CO2 BLD-SCNC: 7 MMOL/L (ref 13–23)
CO2 SERPL-SCNC: 14 MMOL/L (ref 21–32)
CO2 SERPL-SCNC: 15 MMOL/L (ref 21–32)
CREAT SERPL-MCNC: 2.1 MG/DL (ref 0.8–1.5)
CREAT SERPL-MCNC: 2.3 MG/DL (ref 0.8–1.5)
ERYTHROCYTE [DISTWIDTH] IN BLOOD BY AUTOMATED COUNT: 19.3 % (ref 11.9–14.6)
FIO2 ON VENT: 36 %
GAS FLOW.O2 O2 DELIVERY SYS: ABNORMAL
GLUCOSE BLD STRIP.AUTO-MCNC: 154 MG/DL (ref 65–100)
GLUCOSE SERPL-MCNC: 112 MG/DL (ref 65–100)
GLUCOSE SERPL-MCNC: 149 MG/DL (ref 65–100)
HCO3 BLD-SCNC: 6.9 MMOL/L (ref 22–26)
HCT VFR BLD AUTO: 46.6 % (ref 41.1–50.3)
HGB BLD-MCNC: 14.8 G/DL (ref 13.6–17.2)
MCH RBC QN AUTO: 28.7 PG (ref 26.1–32.9)
MCHC RBC AUTO-ENTMCNC: 31.8 G/DL (ref 31.4–35)
MCV RBC AUTO: 90.3 FL (ref 82–102)
NRBC # BLD: 0.02 K/UL (ref 0–0.2)
PCO2 BLD: 13.6 MMHG (ref 35–45)
PH BLD: 7.31 (ref 7.35–7.45)
PLATELET # BLD AUTO: 420 K/UL (ref 150–450)
PMV BLD AUTO: 10.8 FL (ref 9.4–12.3)
PO2 BLD: 167 MMHG (ref 75–100)
POTASSIUM BLD-SCNC: 4.7 MMOL/L (ref 3.5–5.1)
POTASSIUM SERPL-SCNC: 4.6 MMOL/L (ref 3.5–5.1)
POTASSIUM SERPL-SCNC: 4.6 MMOL/L (ref 3.5–5.1)
RBC # BLD AUTO: 5.16 M/UL (ref 4.23–5.6)
SAO2 % BLD: 99 %
SERVICE CMNT-IMP: ABNORMAL
SERVICE CMNT-IMP: ABNORMAL
SODIUM BLD-SCNC: 132 MMOL/L (ref 136–145)
SODIUM SERPL-SCNC: 139 MMOL/L (ref 133–143)
SODIUM SERPL-SCNC: 141 MMOL/L (ref 133–143)
SPECIMEN SITE: ABNORMAL
WBC # BLD AUTO: 18.4 K/UL (ref 4.3–11.1)

## 2023-10-03 PROCEDURE — 2580000003 HC RX 258: Performed by: HOSPITALIST

## 2023-10-03 PROCEDURE — 6370000000 HC RX 637 (ALT 250 FOR IP): Performed by: PHYSICIAN ASSISTANT

## 2023-10-03 PROCEDURE — 6360000002 HC RX W HCPCS: Performed by: HOSPITALIST

## 2023-10-03 PROCEDURE — 6360000002 HC RX W HCPCS: Performed by: INTERNAL MEDICINE

## 2023-10-03 PROCEDURE — 99239 HOSP IP/OBS DSCHRG MGMT >30: CPT | Performed by: INTERNAL MEDICINE

## 2023-10-03 PROCEDURE — 82803 BLOOD GASES ANY COMBINATION: CPT

## 2023-10-03 PROCEDURE — 2580000003 HC RX 258: Performed by: PHYSICIAN ASSISTANT

## 2023-10-03 PROCEDURE — 82330 ASSAY OF CALCIUM: CPT

## 2023-10-03 PROCEDURE — 6360000002 HC RX W HCPCS: Performed by: NURSE PRACTITIONER

## 2023-10-03 PROCEDURE — 94760 N-INVAS EAR/PLS OXIMETRY 1: CPT

## 2023-10-03 PROCEDURE — 6360000002 HC RX W HCPCS: Performed by: FAMILY MEDICINE

## 2023-10-03 PROCEDURE — 2500000003 HC RX 250 WO HCPCS: Performed by: NURSE PRACTITIONER

## 2023-10-03 PROCEDURE — 71045 X-RAY EXAM CHEST 1 VIEW: CPT

## 2023-10-03 PROCEDURE — 94640 AIRWAY INHALATION TREATMENT: CPT

## 2023-10-03 PROCEDURE — 83605 ASSAY OF LACTIC ACID: CPT

## 2023-10-03 PROCEDURE — 82947 ASSAY GLUCOSE BLOOD QUANT: CPT

## 2023-10-03 PROCEDURE — 94664 DEMO&/EVAL PT USE INHALER: CPT

## 2023-10-03 PROCEDURE — 2700000000 HC OXYGEN THERAPY PER DAY

## 2023-10-03 PROCEDURE — 6370000000 HC RX 637 (ALT 250 FOR IP): Performed by: HOSPITALIST

## 2023-10-03 PROCEDURE — 2580000003 HC RX 258: Performed by: NURSE PRACTITIONER

## 2023-10-03 PROCEDURE — 84295 ASSAY OF SERUM SODIUM: CPT

## 2023-10-03 PROCEDURE — 84132 ASSAY OF SERUM POTASSIUM: CPT

## 2023-10-03 PROCEDURE — 2580000003 HC RX 258: Performed by: INTERNAL MEDICINE

## 2023-10-03 PROCEDURE — 6370000000 HC RX 637 (ALT 250 FOR IP): Performed by: INTERNAL MEDICINE

## 2023-10-03 PROCEDURE — 80048 BASIC METABOLIC PNL TOTAL CA: CPT

## 2023-10-03 PROCEDURE — 85014 HEMATOCRIT: CPT

## 2023-10-03 PROCEDURE — 36600 WITHDRAWAL OF ARTERIAL BLOOD: CPT

## 2023-10-03 PROCEDURE — 36415 COLL VENOUS BLD VENIPUNCTURE: CPT

## 2023-10-03 PROCEDURE — 85027 COMPLETE CBC AUTOMATED: CPT

## 2023-10-03 RX ORDER — BUDESONIDE 0.5 MG/2ML
0.5 INHALANT ORAL
Qty: 60 EACH | Refills: 3
Start: 2023-10-03

## 2023-10-03 RX ORDER — DOBUTAMINE HYDROCHLORIDE 200 MG/100ML
2.5 INJECTION INTRAVENOUS CONTINUOUS
Status: DISCONTINUED | OUTPATIENT
Start: 2023-10-03 | End: 2023-10-03 | Stop reason: HOSPADM

## 2023-10-03 RX ORDER — ARFORMOTEROL TARTRATE 15 UG/2ML
15 SOLUTION RESPIRATORY (INHALATION)
Qty: 120 ML | Refills: 3
Start: 2023-10-03

## 2023-10-03 RX ORDER — DOBUTAMINE HYDROCHLORIDE 200 MG/100ML
2.5 INJECTION INTRAVENOUS CONTINUOUS
Qty: 250 ML | Refills: 0
Start: 2023-10-03

## 2023-10-03 RX ORDER — DOPAMINE HYDROCHLORIDE 320 MG/100ML
5 INJECTION, SOLUTION INTRAVENOUS CONTINUOUS
Status: DISCONTINUED | OUTPATIENT
Start: 2023-10-03 | End: 2023-10-03

## 2023-10-03 RX ORDER — PANTOPRAZOLE SODIUM 40 MG/1
40 TABLET, DELAYED RELEASE ORAL
Qty: 30 TABLET | Refills: 3 | Status: SHIPPED | OUTPATIENT
Start: 2023-10-03

## 2023-10-03 RX ORDER — LEVALBUTEROL INHALATION SOLUTION 1.25 MG/3ML
1.25 SOLUTION RESPIRATORY (INHALATION) EVERY 8 HOURS PRN
Qty: 1 EACH | Refills: 0
Start: 2023-10-03

## 2023-10-03 RX ADMIN — CEFUROXIME AXETIL 500 MG: 250 TABLET ORAL at 08:50

## 2023-10-03 RX ADMIN — WATER 40 MG: 1 INJECTION INTRAMUSCULAR; INTRAVENOUS; SUBCUTANEOUS at 12:48

## 2023-10-03 RX ADMIN — SODIUM BICARBONATE 100 MEQ: 84 INJECTION, SOLUTION INTRAVENOUS at 05:35

## 2023-10-03 RX ADMIN — AMIODARONE HYDROCHLORIDE 0.5 MG/MIN: 50 INJECTION, SOLUTION INTRAVENOUS at 14:18

## 2023-10-03 RX ADMIN — AMIODARONE HYDROCHLORIDE 1 MG/MIN: 50 INJECTION, SOLUTION INTRAVENOUS at 08:25

## 2023-10-03 RX ADMIN — BUDESONIDE 500 MCG: 0.5 INHALANT RESPIRATORY (INHALATION) at 08:03

## 2023-10-03 RX ADMIN — APIXABAN 5 MG: 5 TABLET, FILM COATED ORAL at 08:35

## 2023-10-03 RX ADMIN — TIOTROPIUM BROMIDE INHALATION SPRAY 2 PUFF: 3.12 SPRAY, METERED RESPIRATORY (INHALATION) at 08:03

## 2023-10-03 RX ADMIN — DOPAMINE HYDROCHLORIDE 5 MCG/KG/MIN: 320 INJECTION, SOLUTION INTRAVENOUS at 10:12

## 2023-10-03 RX ADMIN — WATER 40 MG: 1 INJECTION INTRAMUSCULAR; INTRAVENOUS; SUBCUTANEOUS at 00:51

## 2023-10-03 RX ADMIN — DOBUTAMINE HYDROCHLORIDE 2.5 MCG/KG/MIN: 200 INJECTION INTRAVENOUS at 12:56

## 2023-10-03 RX ADMIN — PRAVASTATIN SODIUM 10 MG: 20 TABLET ORAL at 08:34

## 2023-10-03 RX ADMIN — SODIUM CHLORIDE, PRESERVATIVE FREE 10 ML: 5 INJECTION INTRAVENOUS at 08:36

## 2023-10-03 RX ADMIN — METOPROLOL SUCCINATE 50 MG: 25 TABLET, FILM COATED, EXTENDED RELEASE ORAL at 08:50

## 2023-10-03 RX ADMIN — LEVALBUTEROL HYDROCHLORIDE 1.25 MG: 1.25 SOLUTION RESPIRATORY (INHALATION) at 04:44

## 2023-10-03 RX ADMIN — PANTOPRAZOLE SODIUM 40 MG: 40 TABLET, DELAYED RELEASE ORAL at 05:44

## 2023-10-03 RX ADMIN — AMIODARONE HYDROCHLORIDE 1 MG/MIN: 50 INJECTION, SOLUTION INTRAVENOUS at 00:57

## 2023-10-03 RX ADMIN — IVABRADINE 5 MG: 5 TABLET, FILM COATED ORAL at 08:35

## 2023-10-03 RX ADMIN — ARFORMOTEROL TARTRATE 15 MCG: 15 SOLUTION RESPIRATORY (INHALATION) at 08:03

## 2023-10-03 RX ADMIN — SPIRONOLACTONE 25 MG: 25 TABLET ORAL at 08:50

## 2023-10-03 RX ADMIN — CEFTRIAXONE 1000 MG: 1 INJECTION, POWDER, FOR SOLUTION INTRAMUSCULAR; INTRAVENOUS at 11:42

## 2023-10-03 ASSESSMENT — PAIN SCALES - GENERAL
PAINLEVEL_OUTOF10: 0

## 2023-10-03 NOTE — PROGRESS NOTES
Occupational Therapy Note:    Attempted to see patient this PM for occupational therapy treatment  session. Rn stated that pt was about to be transferred to [address]. Will follow.  Thank you,    PADMINI Ruff    Rehab Caseload Tracker

## 2023-10-03 NOTE — CARE COORDINATION
CM informed patient to discharge to [address] fo further management of advanced heart failure. Family at bedside.

## 2023-10-03 NOTE — NURSE NAVIGATOR
CHF teaching completed. CHF background completed. Teaching emphasis on Call Cardiology STAT ,if any of the following are noted:  Short of Breath; with activity or without/ while reclined, Generalize weakness, edema are noted individually or grouped. Cardiac Diet  and salt/fluid restrictions covered. Daily WTs emphasized. Planner with summarization sheet provided with cardiology service and phone number and bathroom scale offered. Total time @ BS is 1 hour and pt verbalize understanding/past post test.  Pt instructed to call myself, if any questions occur. This is refresher teaching.

## 2023-10-03 NOTE — PROGRESS NOTES
Hospitalist Progress Note   Admit Date:  2023  1:03 PM   Name:  Mary Boyd   Age:  52 y.o. Sex:  male  :  1970   MRN:  [id number]   Room:  [facility] 2]    Presenting/Chief Complaint: No chief complaint on file. Reason(s) for Admission: Acute on chronic systolic heart failure (HCC) [I50.23]  Acute on chronic systolic (congestive) heart failure [facility]) [I50.23]     Hospital Course:   Mary Boyd is a 52 y.o. male with medical history of  CHFrEF 10-15% s/p AICD, NICM, LV thrombus on apixaban, HTN, COPD, former smoker, Guido Rumpf who presented from cardiology follow up visit with worsening SOB and peripheral edema. Seen in ED yesterday with NTproBNP 16,329 and R basilar airspace disease. He rec'd lasix 40 IV and Dc'd from ED. He was discharged 4 days ago with new LV thrombus treated with IV heparin then apixaban. He was also treated for aspiration PNA, MRSA screen negative, Bcx2 () NGTD, strep urine antigen negative. discharged home with augmentin. No cough, no rhinorrhea/wheezing. Has episodes where SOB is much worse at rest. No fever or chills. Said he thought he was doing fine after discharge. Does not recall being in the ED yesterday. Vitals: T 93.6F  /94 non hypoxic on 2 LPM NC   Labs: Ntprobnp 13,576, LA 4.1 albumin 2.1 WBC 7.3   CXR independently reviewed and shows persistent RLL airspace opacity       Subjective & 24hr Events: 10/3  Patient is hypotensive, hypothermic this morning. Started on dobutamine drip per cardiology. He is being transferred to [facility] per primary cardiology. Assessment & Plan: This is a 53-year-old male with:    Acute on chronic HFrEF, combined, ef 10%, hx of AICD . IO negative . 1L. Off 2L oxygen- per primary. Improving     Cardiology primary. Patient had right heart catheterization done yesterday with final report pending. Discussed with cardiology Dr. Maged Ingram.   Plan is to transfer patient to [facility] for further evaluation and management as he is very ill and needs tertiary level of care. Started on dobutamine drip to help with tissue perfusion. He is hypothermic and is on warming blanket/bear hugger. COPD exacerbation, mild, improving  Solu-Medrol weaned to 40 mg every 12 hours. Questionable complicated UTI in male:  Urinalysis suggestive of UTI. Unfortunately urine cultures were not done. Cefuroxime switched to ceftriaxone. Lactic acidosis, a/w hypoxia on admission, improved   -Patient has leukocytosis today with white count of 18.4. Persistent RLL opacity also seen on CXR 9/22 when treated for aspiration PNA. RLL opacity could be r/t prior PNA and radiographic changes may lag behind clinical recovery of PNA. Blood cx 2 so far negative. Procalcitonin is 0.47. Antibiotics switched to ceftriaxone. Acute respiratory insuffiency - on 1-2 L NC. Resolved      Hx of LV thrombus on apixaban,     HTN     CKD3a - at baseline     PT/OT evals and PPD needed/ordered? PT/OT    Dispo: Per primary cardiology. Patient is being transferred to [facility]. Diet:  ADULT DIET; Regular; Low Fat/Low Chol/High Fiber/2 gm Na; 2000 ml  VTE prophylaxis: Already on anticoagulation Eliquis   Code status: Full Code      Non-peripheral Lines and Tubes (if present):          Telemetry (if present):  Cardiac/Telemetry Monitor On: Bedside monitor in use, Portable telemetry pack applied        Hospital Problems:  Principal Problem:    Acute on chronic HFrEF (heart failure with reduced ejection fraction) ([facility])  Active Problems:    NICM (nonischemic cardiomyopathy) ([address])    ICD (implantable cardioverter-defibrillator) in place    Lactic acidosis    Acute on chronic systolic (congestive) heart failure (HCC)    Stage 3a chronic kidney disease (HCC)    Left ventricular thrombus without MI ([address])    Centrilobular emphysema ([address])  Resolved Problems:    * No resolved hospital problems.  *      Objective:   Patient Vitals for the past 24 hrs:   Temp Pulse Resp BP SpO2

## 2023-10-03 NOTE — PROGRESS NOTES
RT completed ABG per Order. Results read back to DANIE Anaya, patient Nurse. RT decreases O2 from 4L to 1 Liter (PO2 166). Patient asked for breathing treatment, RT completed prn Xopenex treatment. Patient replied that he was breathing and feeling better. RT reported results to patient RN, En Anaya. 10/03/23 0446   Oxygen Therapy/Pulse Ox   O2 Therapy Oxygen   $Oxygen $Daily Charge   O2 Device Nasal cannula   O2 Flow Rate (L/min) 1 L/min   SpO2 99 %   Pulse Oximeter Device Mode Intermittent   Blood Gas  Performed? Yes   $ABG $Arterial Puncture   Héctor's Test #1 Pos   Site #1 Right Radial   Site Prepped #1 Yes   Number of Attempts #1 1   Pressure Held #1 Yes   Complications #1 None   Post-procedure #1 Standard   Specimen Status #1 Point of care   How Tolerated? Tolerated well   Treatment Team Notification   Reason for Communication Critical results   Type of Critical Result POC test   Critical Lab Information 13.6   Person Result Received From [facility] RT/ DANIE Anaya   Critical Lab Result Type Carbon dioxide; Blood gas   Critical POC Result Type Arterial blood gas   Name of Team Member Notified DANIE Anaya   Treatment Team Role Nurse/Charge Nurse   Method of Communication Face to face   Response In department   Notification Time 3410

## 2023-10-03 NOTE — DISCHARGE SUMMARY
14/42, plt 201. CT showed large atrial thrombus, bibasilar pulmonary infiltrates suggests possible aspiration, small hiatal hernia, and small amount of fluid in Langley's pouch and nonvisualization of the appendix without secondary signs of appendicitis, cholecystitis, or other acute abdominopelvic abnormality identified. Abdominal US negative for acute cholecystitis. Patient underwent treatment of pneumonia with IV antibiotics. Echo was completed and showed EF of 10-15% and large irregular mass present in the apex extending into the zachary-septal and zachary-lateral wall consistent with thrombus. Patient was placed on a heparin drip and was transitioned to Eliquis BID during the hospitalization. GI was consulted for symptoms of diarrhea and dysphasia and recommended OP EGD. On 9/22/2023 patient noted to have an episode of bright red emesis and maroon stool and occult stool was positive. General surgery was consulted. His abdominal pain resolved without evidence of acute cholecystitis. He was discharged on 9/25/2023. However, he presented to the ED again on 9/28/2023 with c/o LE edema, POLANCO and orthopnea. He was admitted for treatment of heart failure exacerbation. He was treated with IVP lasix with good UOP. However, lasix was placed on hold due to hypotension. He was placed on dobutamine drip. Patient was taken to [address] [place] [address] by Dr. Edith Galindo for [facility]. Patient was found to have:    Access site for the RHC was obtained via the right brachial vein. Everton-Steven catheter was used. PA pressure = 37/21 mmHg. PA mean = 27 mmHg. PA Sat = 17.6 %. Wedge pressure = 22 mmHg. AO pressure = 115/88 mmHg. AO Sat = 100 %. TDCO = 1.87 L/min. Shaun CO = 1.39 L/min. Severely depressed cardiac output. PA sat 17%  Wedge mildly elevated a 22 mmHg.     Full echo report:  09/20/23    ECHO (TTE) LIMITED (CONTRAST/BUBBLE/3D PRN) 09/24/2023  2:00 PM (Final)    Interpretation Summary    Left Ventricle: Severely reduced evening. Qty: 60 each, Refills: 3      levalbuterol (XOPENEX) 1.25 MG/3ML nebulizer solution Take 3 mLs by nebulization every 8 hours as needed for Wheezing  Qty: 1 each, Refills: 0      tiotropium (SPIRIVA RESPIMAT) 2.5 MCG/ACT AERS inhaler Inhale 2 puffs into the lungs daily  Qty: 1 each, Refills: 0      DOBUTamine (DOBUTREX) 2 MG/ML Infuse 0.16 mg/min intravenously continuous  Qty: 250 mL, Refills: 0      ivabradine (CORLANOR) 5 MG TABS tablet Take 1 tablet by mouth 2 times daily (with meals)  Qty: 60 tablet, Refills: 0      methylPREDNISolone sodium succ 40 mg/mL in bacteriostatic water injection Infuse 1 mL intravenously in the morning and 1 mL in the evening.   Qty: 2 each, Refills: 0      cefTRIAXone (ROCEPHIN) infusion Infuse 1,000 mg intravenously every 24 hours for 4 days Compound per protocol  Qty: 4 g, Refills: 0      Amiodarone 450 mg in dextrose 5% 250 ml infusion  0.5 mg/minute      CONTINUE these medications which have CHANGED    Details   pantoprazole (PROTONIX) 40 MG tablet Take 1 tablet by mouth 2 times daily (before meals)  Qty: 30 tablet, Refills: 3           CONTINUE these medications which have NOT CHANGED    Details   apixaban (ELIQUIS) 5 MG TABS tablet Take 1 tablet by mouth 2 times daily  Qty: 60 tablet, Refills: 2      pravastatin (PRAVACHOL) 10 MG tablet Take 1 tablet by mouth daily  Qty: 90 tablet, Refills: 3      metoprolol succinate (TOPROL XL) 50 MG extended release tablet Take 2 tablets by mouth 2 times daily  Qty: 360 tablet, Refills: 0      spironolactone (ALDACTONE) 25 MG tablet Take 1 tablet by mouth daily  Qty: 90 tablet, Refills: 3      empagliflozin (JARDIANCE) 10 MG tablet Take 1 tablet by mouth daily  Qty: 90 tablet, Refills: 3           STOP taking these medications       TRELEGY ELLIPTA 100-62.5-25 MCG/ACT AEPB inhaler Comments:   Reason for Stopping:         amoxicillin-clavulanate (AUGMENTIN) 875-125 MG per tablet Comments:   Reason for Stopping:         sucralfate

## 2023-10-04 LAB
BACTERIA SPEC CULT: NORMAL
BACTERIA SPEC CULT: NORMAL
SERVICE CMNT-IMP: NORMAL
SERVICE CMNT-IMP: NORMAL

## 2023-10-26 ENCOUNTER — HOSPITAL ENCOUNTER (EMERGENCY)
Age: 53
Discharge: HOME | End: 2023-10-26

## 2023-10-27 ENCOUNTER — HOSPITAL ENCOUNTER (EMERGENCY)
Age: 53
Discharge: HOME | End: 2023-10-28
Attending: EMERGENCY MEDICINE
Payer: COMMERCIAL

## 2023-10-27 DIAGNOSIS — Z45.1 ADJUSTMENT AND MANAGEMENT OF INFUSION PUMP: Primary | ICD-10-CM

## 2023-10-27 PROCEDURE — 6360000002 HC RX W HCPCS: Performed by: EMERGENCY MEDICINE

## 2023-10-27 RX ORDER — DOBUTAMINE HYDROCHLORIDE 200 MG/100ML
5 INJECTION INTRAVENOUS CONTINUOUS
Status: DISCONTINUED | OUTPATIENT
Start: 2023-10-27 | End: 2023-10-28

## 2023-10-27 RX ADMIN — DOBUTAMINE HYDROCHLORIDE 5 MCG/KG/MIN: 200 INJECTION INTRAVENOUS at 22:30

## 2023-10-27 ASSESSMENT — LIFESTYLE VARIABLES
HOW MANY STANDARD DRINKS CONTAINING ALCOHOL DO YOU HAVE ON A TYPICAL DAY: PATIENT DOES NOT DRINK
HOW OFTEN DO YOU HAVE A DRINK CONTAINING ALCOHOL: NEVER

## 2023-10-27 ASSESSMENT — PAIN SCALES - GENERAL: PAINLEVEL_OUTOF10: 0

## 2023-10-28 ENCOUNTER — HOSPITAL ENCOUNTER (OUTPATIENT)
Age: 53
Setting detail: OBSERVATION
Discharge: HOME | End: 2023-10-29
Attending: GENERAL PRACTICE | Admitting: INTERNAL MEDICINE
Payer: COMMERCIAL

## 2023-10-28 ENCOUNTER — APPOINTMENT (OUTPATIENT)
Dept: GENERAL RADIOLOGY | Age: 53
End: 2023-10-28
Payer: COMMERCIAL

## 2023-10-28 VITALS
BODY MASS INDEX: 20.25 KG/M2 | OXYGEN SATURATION: 98 % | HEART RATE: 134 BPM | HEIGHT: 67 IN | TEMPERATURE: 97.7 F | WEIGHT: 129 LBS | DIASTOLIC BLOOD PRESSURE: 100 MMHG | RESPIRATION RATE: 28 BRPM | SYSTOLIC BLOOD PRESSURE: 121 MMHG

## 2023-10-28 DIAGNOSIS — I50.9 ACUTE ON CHRONIC CONGESTIVE HEART FAILURE, UNSPECIFIED HEART FAILURE TYPE (HCC): Primary | ICD-10-CM

## 2023-10-28 PROBLEM — I50.20 HFREF (HEART FAILURE WITH REDUCED EJECTION FRACTION) (HCC): Status: ACTIVE | Noted: 2023-10-28

## 2023-10-28 LAB
ALBUMIN SERPL-MCNC: 1.8 G/DL (ref 3.5–5)
ALBUMIN/GLOB SERPL: 0.4 (ref 0.4–1.6)
ALP SERPL-CCNC: 97 U/L (ref 50–136)
ALT SERPL-CCNC: 16 U/L (ref 12–65)
ANION GAP SERPL CALC-SCNC: 6 MMOL/L (ref 2–11)
AST SERPL-CCNC: 20 U/L (ref 15–37)
BASOPHILS # BLD: 0.1 K/UL (ref 0–0.2)
BASOPHILS NFR BLD: 1 % (ref 0–2)
BILIRUB SERPL-MCNC: 0.4 MG/DL (ref 0.2–1.1)
BUN SERPL-MCNC: 9 MG/DL (ref 6–23)
CALCIUM SERPL-MCNC: 8.5 MG/DL (ref 8.3–10.4)
CHLORIDE SERPL-SCNC: 110 MMOL/L (ref 101–110)
CO2 SERPL-SCNC: 24 MMOL/L (ref 21–32)
CREAT SERPL-MCNC: 1.1 MG/DL (ref 0.8–1.5)
DIFFERENTIAL METHOD BLD: ABNORMAL
EKG ATRIAL RATE: 128 BPM
EKG DIAGNOSIS: NORMAL
EKG P AXIS: 32 DEGREES
EKG P-R INTERVAL: 152 MS
EKG Q-T INTERVAL: 322 MS
EKG QRS DURATION: 110 MS
EKG QTC CALCULATION (BAZETT): 470 MS
EKG R AXIS: -14 DEGREES
EKG T AXIS: 113 DEGREES
EKG VENTRICULAR RATE: 128 BPM
EOSINOPHIL # BLD: 0.4 K/UL (ref 0–0.8)
EOSINOPHIL NFR BLD: 3 % (ref 0.5–7.8)
ERYTHROCYTE [DISTWIDTH] IN BLOOD BY AUTOMATED COUNT: 18.7 % (ref 11.9–14.6)
GLOBULIN SER CALC-MCNC: 4.8 G/DL (ref 2.8–4.5)
GLUCOSE SERPL-MCNC: 107 MG/DL (ref 65–100)
HCT VFR BLD AUTO: 27.5 % (ref 41.1–50.3)
HGB BLD-MCNC: 9 G/DL (ref 13.6–17.2)
IMM GRANULOCYTES # BLD AUTO: 0.1 K/UL (ref 0–0.5)
IMM GRANULOCYTES NFR BLD AUTO: 1 % (ref 0–5)
LACTATE SERPL-SCNC: 1.6 MMOL/L (ref 0.4–2)
LACTATE SERPL-SCNC: 2.1 MMOL/L (ref 0.4–2)
LACTATE SERPL-SCNC: 2.2 MMOL/L (ref 0.4–2)
LYMPHOCYTES # BLD: 2.2 K/UL (ref 0.5–4.6)
LYMPHOCYTES NFR BLD: 15 % (ref 13–44)
MCH RBC QN AUTO: 27.8 PG (ref 26.1–32.9)
MCHC RBC AUTO-ENTMCNC: 32.7 G/DL (ref 31.4–35)
MCV RBC AUTO: 84.9 FL (ref 82–102)
MONOCYTES # BLD: 0.6 K/UL (ref 0.1–1.3)
MONOCYTES NFR BLD: 5 % (ref 4–12)
NEUTS SEG # BLD: 10.6 K/UL (ref 1.7–8.2)
NEUTS SEG NFR BLD: 76 % (ref 43–78)
NRBC # BLD: 0 K/UL (ref 0–0.2)
PLATELET # BLD AUTO: 652 K/UL (ref 150–450)
PMV BLD AUTO: 9.1 FL (ref 9.4–12.3)
POTASSIUM SERPL-SCNC: 3.6 MMOL/L (ref 3.5–5.1)
PROT SERPL-MCNC: 6.6 G/DL (ref 6.3–8.2)
RBC # BLD AUTO: 3.24 M/UL (ref 4.23–5.6)
SODIUM SERPL-SCNC: 140 MMOL/L (ref 133–143)
WBC # BLD AUTO: 13.9 K/UL (ref 4.3–11.1)

## 2023-10-28 PROCEDURE — G0378 HOSPITAL OBSERVATION PER HR: HCPCS

## 2023-10-28 PROCEDURE — 6360000002 HC RX W HCPCS: Performed by: GENERAL PRACTICE

## 2023-10-28 PROCEDURE — 2580000003 HC RX 258: Performed by: PHYSICIAN ASSISTANT

## 2023-10-28 PROCEDURE — 96365 THER/PROPH/DIAG IV INF INIT: CPT

## 2023-10-28 PROCEDURE — 6360000002 HC RX W HCPCS: Performed by: PHYSICIAN ASSISTANT

## 2023-10-28 PROCEDURE — 83605 ASSAY OF LACTIC ACID: CPT

## 2023-10-28 PROCEDURE — 99212 OFFICE O/P EST SF 10 MIN: CPT | Performed by: INTERNAL MEDICINE

## 2023-10-28 PROCEDURE — 94760 N-INVAS EAR/PLS OXIMETRY 1: CPT

## 2023-10-28 PROCEDURE — 36415 COLL VENOUS BLD VENIPUNCTURE: CPT

## 2023-10-28 PROCEDURE — 6360000002 HC RX W HCPCS: Performed by: INTERNAL MEDICINE

## 2023-10-28 PROCEDURE — 96374 THER/PROPH/DIAG INJ IV PUSH: CPT

## 2023-10-28 PROCEDURE — 85025 COMPLETE CBC W/AUTO DIFF WBC: CPT

## 2023-10-28 PROCEDURE — 96366 THER/PROPH/DIAG IV INF ADDON: CPT

## 2023-10-28 PROCEDURE — 93005 ELECTROCARDIOGRAM TRACING: CPT | Performed by: GENERAL PRACTICE

## 2023-10-28 PROCEDURE — 94640 AIRWAY INHALATION TREATMENT: CPT

## 2023-10-28 PROCEDURE — 2580000003 HC RX 258: Performed by: INTERNAL MEDICINE

## 2023-10-28 PROCEDURE — 99285 EMERGENCY DEPT VISIT HI MDM: CPT

## 2023-10-28 PROCEDURE — 93010 ELECTROCARDIOGRAM REPORT: CPT | Performed by: INTERNAL MEDICINE

## 2023-10-28 PROCEDURE — 71045 X-RAY EXAM CHEST 1 VIEW: CPT

## 2023-10-28 PROCEDURE — 6370000000 HC RX 637 (ALT 250 FOR IP): Performed by: PHYSICIAN ASSISTANT

## 2023-10-28 PROCEDURE — 80053 COMPREHEN METABOLIC PANEL: CPT

## 2023-10-28 RX ORDER — DOBUTAMINE HYDROCHLORIDE 200 MG/100ML
2.5-1 INJECTION INTRAVENOUS CONTINUOUS
Status: DISCONTINUED | OUTPATIENT
Start: 2023-10-28 | End: 2023-10-29 | Stop reason: HOSPADM

## 2023-10-28 RX ORDER — POLYETHYLENE GLYCOL 3350 17 G/17G
17 POWDER, FOR SOLUTION ORAL DAILY PRN
Status: DISCONTINUED | OUTPATIENT
Start: 2023-10-28 | End: 2023-10-29 | Stop reason: HOSPADM

## 2023-10-28 RX ORDER — ACETAMINOPHEN 325 MG/1
650 TABLET ORAL EVERY 6 HOURS PRN
Status: DISCONTINUED | OUTPATIENT
Start: 2023-10-28 | End: 2023-10-29 | Stop reason: HOSPADM

## 2023-10-28 RX ORDER — DOBUTAMINE HYDROCHLORIDE 200 MG/100ML
2.5 INJECTION INTRAVENOUS CONTINUOUS
Status: DISCONTINUED | OUTPATIENT
Start: 2023-10-28 | End: 2023-10-28

## 2023-10-28 RX ORDER — SODIUM CHLORIDE 0.9 % (FLUSH) 0.9 %
5-40 SYRINGE (ML) INJECTION PRN
Status: DISCONTINUED | OUTPATIENT
Start: 2023-10-28 | End: 2023-10-29 | Stop reason: HOSPADM

## 2023-10-28 RX ORDER — ONDANSETRON 4 MG/1
4 TABLET, ORALLY DISINTEGRATING ORAL EVERY 8 HOURS PRN
Status: DISCONTINUED | OUTPATIENT
Start: 2023-10-28 | End: 2023-10-29 | Stop reason: HOSPADM

## 2023-10-28 RX ORDER — BUDESONIDE 0.5 MG/2ML
0.5 INHALANT ORAL
Status: DISCONTINUED | OUTPATIENT
Start: 2023-10-28 | End: 2023-10-29 | Stop reason: HOSPADM

## 2023-10-28 RX ORDER — ACETAMINOPHEN 650 MG/1
650 SUPPOSITORY RECTAL EVERY 6 HOURS PRN
Status: DISCONTINUED | OUTPATIENT
Start: 2023-10-28 | End: 2023-10-29 | Stop reason: HOSPADM

## 2023-10-28 RX ORDER — SODIUM CHLORIDE 0.9 % (FLUSH) 0.9 %
5-40 SYRINGE (ML) INJECTION EVERY 12 HOURS SCHEDULED
Status: DISCONTINUED | OUTPATIENT
Start: 2023-10-28 | End: 2023-10-29 | Stop reason: HOSPADM

## 2023-10-28 RX ORDER — METOPROLOL SUCCINATE 100 MG/1
100 TABLET, EXTENDED RELEASE ORAL 2 TIMES DAILY
Status: DISCONTINUED | OUTPATIENT
Start: 2023-10-28 | End: 2023-10-29 | Stop reason: HOSPADM

## 2023-10-28 RX ORDER — PRAVASTATIN SODIUM 20 MG
10 TABLET ORAL DAILY
Status: DISCONTINUED | OUTPATIENT
Start: 2023-10-28 | End: 2023-10-29 | Stop reason: HOSPADM

## 2023-10-28 RX ORDER — SPIRONOLACTONE 25 MG/1
25 TABLET ORAL DAILY
Status: DISCONTINUED | OUTPATIENT
Start: 2023-10-28 | End: 2023-10-29 | Stop reason: HOSPADM

## 2023-10-28 RX ORDER — ARFORMOTEROL TARTRATE 15 UG/2ML
15 SOLUTION RESPIRATORY (INHALATION)
Status: DISCONTINUED | OUTPATIENT
Start: 2023-10-28 | End: 2023-10-29 | Stop reason: HOSPADM

## 2023-10-28 RX ORDER — LEVALBUTEROL INHALATION SOLUTION 1.25 MG/3ML
1.25 SOLUTION RESPIRATORY (INHALATION) EVERY 8 HOURS PRN
Status: DISCONTINUED | OUTPATIENT
Start: 2023-10-28 | End: 2023-10-29 | Stop reason: HOSPADM

## 2023-10-28 RX ORDER — SODIUM CHLORIDE 9 MG/ML
INJECTION, SOLUTION INTRAVENOUS PRN
Status: DISCONTINUED | OUTPATIENT
Start: 2023-10-28 | End: 2023-10-29 | Stop reason: HOSPADM

## 2023-10-28 RX ORDER — ONDANSETRON 2 MG/ML
4 INJECTION INTRAMUSCULAR; INTRAVENOUS EVERY 6 HOURS PRN
Status: DISCONTINUED | OUTPATIENT
Start: 2023-10-28 | End: 2023-10-29 | Stop reason: HOSPADM

## 2023-10-28 RX ORDER — PANTOPRAZOLE SODIUM 40 MG/1
40 TABLET, DELAYED RELEASE ORAL
Status: DISCONTINUED | OUTPATIENT
Start: 2023-10-28 | End: 2023-10-29 | Stop reason: HOSPADM

## 2023-10-28 RX ADMIN — ALTEPLASE 2 MG: KIT at 17:49

## 2023-10-28 RX ADMIN — APIXABAN 5 MG: 5 TABLET, FILM COATED ORAL at 20:39

## 2023-10-28 RX ADMIN — SODIUM CHLORIDE, PRESERVATIVE FREE 10 ML: 5 INJECTION INTRAVENOUS at 20:39

## 2023-10-28 RX ADMIN — BUDESONIDE INHALATION 500 MCG: 0.5 SUSPENSION RESPIRATORY (INHALATION) at 13:48

## 2023-10-28 RX ADMIN — IVABRADINE 5 MG: 5 TABLET, FILM COATED ORAL at 17:28

## 2023-10-28 RX ADMIN — PANTOPRAZOLE SODIUM 40 MG: 40 TABLET, DELAYED RELEASE ORAL at 17:27

## 2023-10-28 RX ADMIN — ARFORMOTEROL TARTRATE 15 MCG: 15 SOLUTION RESPIRATORY (INHALATION) at 13:48

## 2023-10-28 RX ADMIN — ARFORMOTEROL TARTRATE 15 MCG: 15 SOLUTION RESPIRATORY (INHALATION) at 20:31

## 2023-10-28 RX ADMIN — BUDESONIDE INHALATION 500 MCG: 0.5 SUSPENSION RESPIRATORY (INHALATION) at 20:31

## 2023-10-28 RX ADMIN — TIOTROPIUM BROMIDE INHALATION SPRAY 2 PUFF: 3.12 SPRAY, METERED RESPIRATORY (INHALATION) at 13:48

## 2023-10-28 RX ADMIN — ALTEPLASE 2 MG: KIT at 14:47

## 2023-10-28 RX ADMIN — DOBUTAMINE HYDROCHLORIDE 5 MCG/KG/MIN: 200 INJECTION INTRAVENOUS at 12:54

## 2023-10-28 ASSESSMENT — ENCOUNTER SYMPTOMS
COLOR CHANGE: 0
APNEA: 0
ABDOMINAL DISTENTION: 0
EYE DISCHARGE: 0

## 2023-10-28 ASSESSMENT — PAIN - FUNCTIONAL ASSESSMENT: PAIN_FUNCTIONAL_ASSESSMENT: NONE - DENIES PAIN

## 2023-10-28 NOTE — ED PROVIDER NOTES
Emergency Department Provider Note                   PCP:                Karo Kruger MD               Age: 52 y.o. Sex: male       ICD-10-CM    1. Adjustment and management of infusion pump  Z45.1           DISPOSITION Decision To Discharge 10/28/2023 12:08:42 AM        MDM  Number of Diagnoses or Management Options  Diagnosis management comments: MEDICAL DECISION MAKING  Complexity of Problems Addressed:  1 or more acute illnesses that pose a threat to life or bodily function. Data Reviewed and Analyzed:  Category 1:   I independently ordered and reviewed each unique test.  I reviewed external records: provider visit note from outside specialist.  I reviewed external records: [id number] [facility] admission     Category 3: Discussion of management or test interpretation. The patient presents due to malfunctioning of his dobutamine pump. IV dobutamine is started at his prescribed rate while we were trying to troubleshoot the issue. Nursing staff was able to get some phone support on resetting and working with the pump. After changing the batteries and adjusting the tubing according to directions, the pump was once again working properly. The pump was hooked back to the patient's Picc line and was working properly. Patient was discharged recommending close follow up. Risk of Complications and/or Morbidity of Patient Management:  Chronic medical problems impacting care include Hear failure on transplant list.                Orders Placed This Encounter   Procedures    Saline lock IV        Medications - No data to display    New Prescriptions    No medications on file        Enmanuel Weeks is a 52 y.o. male who presents to the Emergency Department with chief complaint of    Chief Complaint   Patient presents with    Dobutamine pump      The patient presents with pump malfunction. The patient is on the heart transplant list and is on continuous dobutamine infusion.   After home health changed the bag of his dobutamine fluid today they could not get the pump restarted. They were unable to troubleshoot this and so the patient came to the emergency department. He is also on antibiotic right now for pneumonia. He states he has no new acute complaints. The history is provided by the patient. No  was used. Review of Systems    Past Medical History:   Diagnosis Date    AICD (automatic cardioverter/defibrillator) present     COPD (chronic obstructive pulmonary disease) ([facility])     HFrEF (heart failure with reduced ejection fraction) ([facility])     Hypertension     NICM (nonischemic cardiomyopathy) ([address] )         Past Surgical History:   Procedure Laterality Date    CARDIAC PROCEDURE N/A 2023    Left heart cath / coronary angiography performed by Leonides Duarte MD at [address] N/A 10/2/2023    Right heart cath performed by Harrison Will MD at [address] [facility] [place] N/A 2023    Insert ICD dual performed by Abdelrahman Talamantes MD at [address] [facility]        No family history on file. Social History     Socioeconomic History    Marital status:    Tobacco Use    Smoking status: Former     Types: Cigarettes     Quit date: 2023     Years since quittin.4     Passive exposure: Current    Smokeless tobacco: Never   Substance and Sexual Activity    Alcohol use: Yes    Drug use: No         Entresto [sacubitril-valsartan] and Lisinopril     Previous Medications    APIXABAN (ELIQUIS) 5 MG TABS TABLET    Take 1 tablet by mouth 2 times daily    ARFORMOTEROL TARTRATE (BROVANA) 15 MCG/2ML NEBU    Take 2 mLs by nebulization in the morning and 2 mLs in the evening. BUDESONIDE (PULMICORT) 0.5 MG/2ML NEBULIZER SUSPENSION    Take 2 mLs by nebulization in the morning and 2 mLs in the evening.     DOBUTAMINE (DOBUTREX) 2 MG/ML    Infuse 0.16 mg/min intravenously continuous    EMPAGLIFLOZIN (JARDIANCE) 10 MG TABLET    Take 1 tablet by

## 2023-10-28 NOTE — PROCEDURES
PICC line present on admission, placed at [address], neither lumen flushing, contacted vascular access team to assess, per vascular access team PICC line is in correct location and needs cathflo to clear line, cathflo ordered. unknown

## 2023-10-28 NOTE — ED TRIAGE NOTES
Arrives via [place] from home. Called for continuous dobutamine pump beeping.  Pt states on heart transplant list. Denies CP or SHOB alert and oriented

## 2023-10-28 NOTE — ED PROVIDER NOTES
Interval placement of a right-sided  PICC line whose distal catheter tip terminates overlying the cavoatrial junction  in satisfactory position. Redemonstration of a rounded/well-circumscribed asymmetric density overlying the  right midlung, either representing a small focus of infiltrate/atelectasis or  loculated fluid. There is a new wedge-shaped region of consolidation more  laterally to this prior focus within the right lower lung. Small right-sided  pleural effusion. No pneumothorax. .  The cardiomediastinal silhouette is  enlarged. There are no acute osseous abnormalities. Impression    1. Interval satisfactory placement of right-sided PICC line. 2.  Interval development of a wedge-shaped region of consolidation/infiltrate  within the lower, lateral right lung, more peripheral than the previously  identified rounded/circumscribed density in the right midlung. This may  represent a small focus of postobstructive atelectasis although developing  pneumonia would be impossible to exclude. 3.  Small right-sided pleural effusion.        CBC with Auto Differential   Result Value Ref Range    WBC 13.9 (H) 4.3 - 11.1 K/uL    RBC 3.24 (L) 4.23 - 5.6 M/uL    Hemoglobin 9.0 (L) 13.6 - 17.2 g/dL    Hematocrit 27.5 (L) 41.1 - 50.3 %    MCV 84.9 82 - 102 FL    MCH 27.8 26.1 - 32.9 PG    MCHC 32.7 31.4 - 35.0 g/dL    RDW 18.7 (H) 11.9 - 14.6 %    Platelets 368 (H) 878 - 450 K/uL    MPV 9.1 (L) 9.4 - 12.3 FL    nRBC 0.00 0.0 - 0.2 K/uL    Differential Type AUTOMATED      Neutrophils % 76 43 - 78 %    Lymphocytes % 15 13 - 44 %    Monocytes % 5 4.0 - 12.0 %    Eosinophils % 3 0.5 - 7.8 %    Basophils % 1 0.0 - 2.0 %    Immature Granulocytes 1 0.0 - 5.0 %    Neutrophils Absolute 10.6 (H) 1.7 - 8.2 K/UL    Lymphocytes Absolute 2.2 0.5 - 4.6 K/UL    Monocytes Absolute 0.6 0.1 - 1.3 K/UL    Eosinophils Absolute 0.4 0.0 - 0.8 K/UL    Basophils Absolute 0.1 0.0 - 0.2 K/UL    Absolute Immature Granulocyte 0.1 0.0 - 0.5 K/UL

## 2023-10-29 VITALS
BODY MASS INDEX: 20.53 KG/M2 | OXYGEN SATURATION: 100 % | WEIGHT: 130.8 LBS | DIASTOLIC BLOOD PRESSURE: 94 MMHG | RESPIRATION RATE: 17 BRPM | HEART RATE: 109 BPM | TEMPERATURE: 97.5 F | HEIGHT: 67 IN | SYSTOLIC BLOOD PRESSURE: 119 MMHG

## 2023-10-29 LAB
ANION GAP SERPL CALC-SCNC: 9 MMOL/L (ref 2–11)
BUN SERPL-MCNC: 11 MG/DL (ref 6–23)
CALCIUM SERPL-MCNC: 8.6 MG/DL (ref 8.3–10.4)
CHLORIDE SERPL-SCNC: 109 MMOL/L (ref 101–110)
CO2 SERPL-SCNC: 23 MMOL/L (ref 21–32)
CREAT SERPL-MCNC: 1.1 MG/DL (ref 0.8–1.5)
ERYTHROCYTE [DISTWIDTH] IN BLOOD BY AUTOMATED COUNT: 18.4 % (ref 11.9–14.6)
GLUCOSE SERPL-MCNC: 99 MG/DL (ref 65–100)
HCT VFR BLD AUTO: 24.6 % (ref 41.1–50.3)
HGB BLD-MCNC: 8 G/DL (ref 13.6–17.2)
LACTATE SERPL-SCNC: 1.5 MMOL/L (ref 0.4–2)
MAGNESIUM SERPL-MCNC: 1.9 MG/DL (ref 1.8–2.4)
MCH RBC QN AUTO: 27.2 PG (ref 26.1–32.9)
MCHC RBC AUTO-ENTMCNC: 32.5 G/DL (ref 31.4–35)
MCV RBC AUTO: 83.7 FL (ref 82–102)
NRBC # BLD: 0 K/UL (ref 0–0.2)
PLATELET # BLD AUTO: 550 K/UL (ref 150–450)
PMV BLD AUTO: 9.5 FL (ref 9.4–12.3)
POTASSIUM SERPL-SCNC: 3.5 MMOL/L (ref 3.5–5.1)
RBC # BLD AUTO: 2.94 M/UL (ref 4.23–5.6)
SODIUM SERPL-SCNC: 141 MMOL/L (ref 133–143)
WBC # BLD AUTO: 12.1 K/UL (ref 4.3–11.1)

## 2023-10-29 PROCEDURE — 83605 ASSAY OF LACTIC ACID: CPT

## 2023-10-29 PROCEDURE — 6360000002 HC RX W HCPCS: Performed by: PHYSICIAN ASSISTANT

## 2023-10-29 PROCEDURE — 96367 TX/PROPH/DG ADDL SEQ IV INF: CPT

## 2023-10-29 PROCEDURE — 94760 N-INVAS EAR/PLS OXIMETRY 1: CPT

## 2023-10-29 PROCEDURE — 94640 AIRWAY INHALATION TREATMENT: CPT

## 2023-10-29 PROCEDURE — G0378 HOSPITAL OBSERVATION PER HR: HCPCS

## 2023-10-29 PROCEDURE — 85027 COMPLETE CBC AUTOMATED: CPT

## 2023-10-29 PROCEDURE — 6370000000 HC RX 637 (ALT 250 FOR IP): Performed by: PHYSICIAN ASSISTANT

## 2023-10-29 PROCEDURE — 96366 THER/PROPH/DIAG IV INF ADDON: CPT

## 2023-10-29 PROCEDURE — 80048 BASIC METABOLIC PNL TOTAL CA: CPT

## 2023-10-29 PROCEDURE — 2580000003 HC RX 258: Performed by: PHYSICIAN ASSISTANT

## 2023-10-29 PROCEDURE — 99212 OFFICE O/P EST SF 10 MIN: CPT | Performed by: INTERNAL MEDICINE

## 2023-10-29 PROCEDURE — 6360000002 HC RX W HCPCS: Performed by: INTERNAL MEDICINE

## 2023-10-29 PROCEDURE — 83735 ASSAY OF MAGNESIUM: CPT

## 2023-10-29 RX ORDER — MILRINONE LACTATE 0.2 MG/ML
.0625-.75 INJECTION, SOLUTION INTRAVENOUS CONTINUOUS
Status: DISCONTINUED | OUTPATIENT
Start: 2023-10-29 | End: 2023-10-29 | Stop reason: HOSPADM

## 2023-10-29 RX ADMIN — MILRINONE LACTATE IN DEXTROSE 0.12 MCG/KG/MIN: 200 INJECTION, SOLUTION INTRAVENOUS at 07:37

## 2023-10-29 RX ADMIN — ARFORMOTEROL TARTRATE 15 MCG: 15 SOLUTION RESPIRATORY (INHALATION) at 07:00

## 2023-10-29 RX ADMIN — EMPAGLIFLOZIN 10 MG: 10 TABLET, FILM COATED ORAL at 08:28

## 2023-10-29 RX ADMIN — IVABRADINE 5 MG: 5 TABLET, FILM COATED ORAL at 08:27

## 2023-10-29 RX ADMIN — SPIRONOLACTONE 25 MG: 25 TABLET ORAL at 08:27

## 2023-10-29 RX ADMIN — PANTOPRAZOLE SODIUM 40 MG: 40 TABLET, DELAYED RELEASE ORAL at 05:18

## 2023-10-29 RX ADMIN — SODIUM CHLORIDE, PRESERVATIVE FREE 10 ML: 5 INJECTION INTRAVENOUS at 08:28

## 2023-10-29 RX ADMIN — TIOTROPIUM BROMIDE INHALATION SPRAY 2 PUFF: 3.12 SPRAY, METERED RESPIRATORY (INHALATION) at 07:00

## 2023-10-29 RX ADMIN — BUDESONIDE INHALATION 500 MCG: 0.5 SUSPENSION RESPIRATORY (INHALATION) at 07:00

## 2023-10-29 RX ADMIN — APIXABAN 5 MG: 5 TABLET, FILM COATED ORAL at 08:28

## 2023-10-29 ASSESSMENT — ENCOUNTER SYMPTOMS
COLOR CHANGE: 0
EYE DISCHARGE: 0
APNEA: 0
ABDOMINAL DISTENTION: 0

## 2023-10-29 NOTE — DISCHARGE SUMMARY
[facility] Cardiology Discharge Summary     Patient ID:  Daniel Jacobsen  [id number]  52 y.o.  1970    Admit date: 10/28/2023    Discharge date and time:  10/29/2023    Admitting Physician: Janie Gaalrza MD     Discharge Physician: DANTE Wilson/Dr. Abran Sotelo    Admission Diagnoses: HFrEF (heart failure with reduced ejection fraction) ([address]) [I50.20]  Acute on chronic congestive heart failure, unspecified heart failure type [facility]) [I50.9]    Discharge Diagnoses:   Patient Active Problem List    Diagnosis    ICD (implantable cardioverter-defibrillator) in place    NICM (nonischemic cardiomyopathy) ([address])    Chronic HFrEF (heart failure with reduced ejection fraction) (HCC)    HFrEF (heart failure with reduced ejection fraction) ([address])    Iron deficiency    Left ventricular thrombus without MI ([address])    Centrilobular emphysema ([address])    Stage 3a chronic kidney disease [facility])       Cardiology Procedures this admission:   None  Consults: none    Hospital Course: Patient is a 52 y.o. male HFrEF nonischemic cardiomyopathy history of left ventricular thrombus. Patient with recurrent admissions to [facility] for decompensated heart failure. He was referred to the [address] [place] [address] for consideration of advanced therapies and was placed on dobutamine infusion at home. There have been difficulties operating his home dobutamine infusion pump. He presented to [facility] emergency department. The [facility] emergency room physician has communicated with advanced heart failure team at the [facility] is felt that issues with the pump cannot be solved at this time and inpatient admission was recommended for inotropic support. He was admitted for further care. Per vascular access team PICC line is in correct location and needs cathflo to clear line, cathflo ordered. PICC line how working properly.      The following morning Pt was up feeling well without any complaints

## 2023-10-29 NOTE — CARE COORDINATION
Pt is for discharge home today with family after an overnight hospital stay in observation. Referral called/faxed back to [employer] and [facility] to resume services as prior to admission. No additional CM orders received or supportive care needs expressed at this time. 10/29/23 2607   Service Assessment   Patient Orientation Alert and Oriented   Cognition Alert   History Provided By Patient;Medical Record   Primary Caregiver Self   Support Systems Spouse/Significant Other   Patient's Healthcare Decision Maker is: Legal Next of Kin   PCP Verified by CM Yes   Last Visit to PCP Within last 3 months   Prior Functional Level Independent in ADLs/IADLs   Current Functional Level Independent in ADLs/IADLs   Can patient return to prior living arrangement Yes   Ability to make needs known: Good   Family able to assist with home care needs: Yes   Would you like for me to discuss the discharge plan with any other family members/significant others, and if so, who? No   Financial Resources Other (Comment)  ([employer])   Community Resources Other (Comment)  (has [facility] and [facility])   Social/Functional History   Lives With Spouse   Type of [address] [place] [address]  One level   Bathroom Shower/Tub Tub/Shower unit   [address] [place] [address]. None   Receives Help From Family   ADL Assistance Independent   Homemaking Assistance Independent   Ambulation Assistance Independent   Transfer Assistance Independent   Active  Yes   Mode of Transportation Car   Occupation On disability   Discharge Planning   Patient expects to be discharged to: [facility]   One/Two Story Residence One story   Services At/After Discharge   Transition of [address] [place] [address]  ([facility]) Cristian Purdy; Other  (Home Infusion)   Internal Home Health No   Reason Outside Agency Chosen Patient already serviced by other home care/hospice agency   Services At/After

## 2023-10-29 NOTE — DISCHARGE INSTRUCTIONS
DISPOSITION: The patient is being discharged home in stable condition on a low saturated fat, low cholesterol and low salt diet. Pt is instructed to follow a fluid restricted diet with no more than 2 liters per day. Pt is instructed to advance activities as tolerated limited to fatigue or shortness of breath. Pt is instructed to call office or return to ER for immediate evaluation of any shortness of breath or chest pain. Follow up/Office Visit for next steps with [address] as scheduled.

## 2023-10-30 NOTE — H&P
History of present illness:53 y.o. male HFrEF nonischemic cardiomyopathy history of left ventricular thrombus. Patient with recurrent admissions to [facility] for decompensated heart failure. He was referred to the [address] [place] [address] of [address] [place] [address] for consideration of advanced therapies and was placed on dobutamine infusion at home. There have been difficulties operating his home dobutamine infusion pump. He presented to [facility] emergency department. The [facility] emergency room physician has communicated with advanced heart failure team at the medical versive [address] [place] [address] is felt that issues with the pump cannot be solved at this time and inpatient admission was recommended for inotropic support. Cardiac history  PA pressure = 37/21 mmHg. PA mean = 27 mmHg. PA Sat = 17.6 %. Wedge pressure = 22 mmHg. AO pressure = 115/88 mmHg. AO Sat = 100 %. TDCO = 1.87 L/min. Shaun CO = 1.39 L/min. Severely depressed cardiac output. PA sat 17%  Wedge mildly elevated a 22 mmHg. Review of Systems   Constitutional:  Negative for activity change. HENT:  Negative for congestion. Eyes:  Negative for discharge. Respiratory:  Negative for apnea. Cardiovascular:  Negative for chest pain. Gastrointestinal:  Negative for abdominal distention. Endocrine: Negative for cold intolerance. Genitourinary:  Negative for difficulty urinating. Musculoskeletal:  Negative for arthralgias. Skin:  Negative for color change. Allergic/Immunologic: Negative for environmental allergies. Neurological:  Negative for dizziness. Hematological:  Negative for adenopathy. Psychiatric/Behavioral:  Negative for agitation.           Past Medical History        Past Medical History:   Diagnosis Date    AICD (automatic cardioverter/defibrillator) present      COPD (chronic obstructive pulmonary disease) ([facility])      HFrEF (heart failure with reduced ejection fraction) ([facility])      Hypertension

## 2023-11-20 PROCEDURE — 93296 REM INTERROG EVL PM/IDS: CPT | Performed by: INTERNAL MEDICINE

## 2023-11-20 PROCEDURE — 93295 DEV INTERROG REMOTE 1/2/MLT: CPT | Performed by: INTERNAL MEDICINE

## 2023-12-10 NOTE — PROGRESS NOTES
[facility] CARDIOLOGY Follow Up                 Reason for Visit: Chronic HFrEF     Subjective:     Patient is a 52 y.o. male with a PMH of chronic HFrEF on inotropic support, NICM, LV thrombus, status post ICD, hypertension, hyperlipidemia and cavitary pneumonia (streptococcal pneumonia) who presents for follow up. The patient was last seen in September 2023. He was directly admitted from clinic for ADHF. The patient was transferred from [facility] to [facility]. He was treated for cardiogenic shock and necrotizing pneumonia at [facility]. He [facility] where he is being considered for advanced options. He visited cardiology at [facility] on December 6 where Bumex was decreased to 2 mg twice daily. Hydralazine was increased to 75 mg 3 times daily. The patient had a TTE on October 4 that was noted to demonstrate an EF of 10% and LV thrombus. He follows with pulmonology and ID at [facility]. He developed near syncope yesterday. His Hgb was 11.5 and sCr 1.4 in the ER. He denies melena and BRBPR. He reports dyspnea \"every now and then\". He denies fever and chest pain. He follows up with [facility] in early January 2024. Past Medical History:   Diagnosis Date    AICD (automatic cardioverter/defibrillator) present     COPD (chronic obstructive pulmonary disease) ([facility])     HFrEF (heart failure with reduced ejection fraction) ([facility])     Hypertension     NICM (nonischemic cardiomyopathy) ([facility])       Past Surgical History:   Procedure Laterality Date    CARDIAC PROCEDURE N/A 5/31/2023    Left heart cath / coronary angiography performed by Ebony Santos MD at [address] N/A 10/2/2023    Right heart cath performed by Jessica Reddy MD at [facility] N/A 5/9/2023    Insert ICD dual performed by Vanna Vallejo MD at [facility]      No family history on file.    Social History     Tobacco Use    Smoking status: Former     Types: Cigarettes

## 2023-12-11 ENCOUNTER — APPOINTMENT (OUTPATIENT)
Dept: GENERAL RADIOLOGY | Age: 53
End: 2023-12-11
Payer: COMMERCIAL

## 2023-12-11 ENCOUNTER — HOSPITAL ENCOUNTER (EMERGENCY)
Age: 53
Discharge: HOME | End: 2023-12-11
Attending: EMERGENCY MEDICINE
Payer: COMMERCIAL

## 2023-12-11 VITALS
RESPIRATION RATE: 26 BRPM | DIASTOLIC BLOOD PRESSURE: 77 MMHG | TEMPERATURE: 97.5 F | BODY MASS INDEX: 18.83 KG/M2 | HEART RATE: 110 BPM | OXYGEN SATURATION: 99 % | WEIGHT: 120 LBS | SYSTOLIC BLOOD PRESSURE: 104 MMHG | HEIGHT: 67 IN

## 2023-12-11 DIAGNOSIS — R55 NEAR SYNCOPE: Primary | ICD-10-CM

## 2023-12-11 DIAGNOSIS — R74.8 CARDIAC ENZYMES ELEVATED: ICD-10-CM

## 2023-12-11 PROBLEM — R42 DIZZINESS: Status: ACTIVE | Noted: 2023-12-11

## 2023-12-11 LAB
ALBUMIN SERPL-MCNC: 2.5 G/DL (ref 3.5–5)
ALBUMIN/GLOB SERPL: 0.6 (ref 0.4–1.6)
ALP SERPL-CCNC: 78 U/L (ref 50–136)
ALT SERPL-CCNC: 14 U/L (ref 12–65)
ANION GAP SERPL CALC-SCNC: 7 MMOL/L (ref 2–11)
AST SERPL-CCNC: 19 U/L (ref 15–37)
BACTERIA URNS QL MICRO: NEGATIVE /HPF
BASOPHILS # BLD: 0.2 K/UL (ref 0–0.2)
BASOPHILS NFR BLD: 1 % (ref 0–2)
BILIRUB SERPL-MCNC: 0.8 MG/DL (ref 0.2–1.1)
BILIRUB UR QL: NEGATIVE
BUN SERPL-MCNC: 18 MG/DL (ref 6–23)
CALCIUM SERPL-MCNC: 9.2 MG/DL (ref 8.3–10.4)
CASTS URNS QL MICRO: NORMAL /LPF
CHLORIDE SERPL-SCNC: 107 MMOL/L (ref 103–113)
CO2 SERPL-SCNC: 27 MMOL/L (ref 21–32)
CREAT SERPL-MCNC: 1.4 MG/DL (ref 0.8–1.5)
DIFFERENTIAL METHOD BLD: ABNORMAL
EKG ATRIAL RATE: 102 BPM
EKG DIAGNOSIS: NORMAL
EKG P AXIS: 43 DEGREES
EKG P-R INTERVAL: 164 MS
EKG Q-T INTERVAL: 371 MS
EKG QRS DURATION: 120 MS
EKG QTC CALCULATION (BAZETT): 484 MS
EKG R AXIS: 36 DEGREES
EKG T AXIS: 240 DEGREES
EKG VENTRICULAR RATE: 102 BPM
EOSINOPHIL # BLD: 1 K/UL (ref 0–0.8)
EOSINOPHIL NFR BLD: 7 % (ref 0.5–7.8)
EPI CELLS #/AREA URNS HPF: NORMAL /HPF
ERYTHROCYTE [DISTWIDTH] IN BLOOD BY AUTOMATED COUNT: 15.9 % (ref 11.9–14.6)
GLOBULIN SER CALC-MCNC: 4.4 G/DL (ref 2.8–4.5)
GLUCOSE SERPL-MCNC: 113 MG/DL (ref 65–100)
GLUCOSE UR QL STRIP.AUTO: 250 MG/DL
HCT VFR BLD AUTO: 36.6 % (ref 41.1–50.3)
HGB BLD-MCNC: 11.5 G/DL (ref 13.6–17.2)
IMM GRANULOCYTES # BLD AUTO: 0.1 K/UL (ref 0–0.5)
IMM GRANULOCYTES NFR BLD AUTO: 0 % (ref 0–5)
KETONES UR-MCNC: NEGATIVE MG/DL
LEUKOCYTE ESTERASE UR QL STRIP: NEGATIVE
LYMPHOCYTES # BLD: 1.9 K/UL (ref 0.5–4.6)
LYMPHOCYTES NFR BLD: 13 % (ref 13–44)
MCH RBC QN AUTO: 27.4 PG (ref 26.1–32.9)
MCHC RBC AUTO-ENTMCNC: 31.4 G/DL (ref 31.4–35)
MCV RBC AUTO: 87.4 FL (ref 82–102)
MONOCYTES # BLD: 1.1 K/UL (ref 0.1–1.3)
MONOCYTES NFR BLD: 7 % (ref 4–12)
NEUTS SEG # BLD: 10.3 K/UL (ref 1.7–8.2)
NEUTS SEG NFR BLD: 72 % (ref 43–78)
NITRITE UR QL: NEGATIVE
NRBC # BLD: 0 K/UL (ref 0–0.2)
PH UR: 7 (ref 5–9)
PLATELET # BLD AUTO: 290 K/UL (ref 150–450)
PMV BLD AUTO: 9.7 FL (ref 9.4–12.3)
POTASSIUM SERPL-SCNC: 3.1 MMOL/L (ref 3.5–5.1)
PROT SERPL-MCNC: 6.9 G/DL (ref 6.3–8.2)
PROT UR QL: ABNORMAL MG/DL
RBC # BLD AUTO: 4.19 M/UL (ref 4.23–5.6)
RBC # UR STRIP: NEGATIVE
RBC #/AREA URNS HPF: NORMAL /HPF
SERVICE CMNT-IMP: ABNORMAL
SODIUM SERPL-SCNC: 141 MMOL/L (ref 136–146)
SP GR UR: 1.01 (ref 1–1.02)
TROPONIN I SERPL HS-MCNC: 164.7 PG/ML (ref 0–14)
TROPONIN I SERPL HS-MCNC: 169.3 PG/ML (ref 0–14)
UROBILINOGEN UR QL: 0.2 EU/DL (ref 0.2–1)
WBC # BLD AUTO: 14.4 K/UL (ref 4.3–11.1)
WBC URNS QL MICRO: NORMAL /HPF

## 2023-12-11 PROCEDURE — 80053 COMPREHEN METABOLIC PANEL: CPT

## 2023-12-11 PROCEDURE — 99285 EMERGENCY DEPT VISIT HI MDM: CPT

## 2023-12-11 PROCEDURE — 81003 URINALYSIS AUTO W/O SCOPE: CPT

## 2023-12-11 PROCEDURE — 99285 EMERGENCY DEPT VISIT HI MDM: CPT | Performed by: INTERNAL MEDICINE

## 2023-12-11 PROCEDURE — 93005 ELECTROCARDIOGRAM TRACING: CPT | Performed by: EMERGENCY MEDICINE

## 2023-12-11 PROCEDURE — 71045 X-RAY EXAM CHEST 1 VIEW: CPT

## 2023-12-11 PROCEDURE — 93010 ELECTROCARDIOGRAM REPORT: CPT | Performed by: INTERNAL MEDICINE

## 2023-12-11 PROCEDURE — 81001 URINALYSIS AUTO W/SCOPE: CPT

## 2023-12-11 PROCEDURE — 81015 MICROSCOPIC EXAM OF URINE: CPT

## 2023-12-11 PROCEDURE — 85025 COMPLETE CBC W/AUTO DIFF WBC: CPT

## 2023-12-11 PROCEDURE — 84484 ASSAY OF TROPONIN QUANT: CPT

## 2023-12-11 ASSESSMENT — PAIN SCALES - GENERAL: PAINLEVEL_OUTOF10: 0

## 2023-12-11 ASSESSMENT — ENCOUNTER SYMPTOMS
RESPIRATORY NEGATIVE: 1
VOMITING: 0
EYES NEGATIVE: 1
NAUSEA: 0
HEARTBURN: 0

## 2023-12-11 NOTE — CARE COORDINATION
Patient lives at home with spouse and has received [facility] services through [employer]. He can drive but is not able to work at this time. Spouse handles most of the household responsibilities. CM to follow clinical course for discharge planning. Patient confirms POA and   12/11/23 1536   Service Assessment   Patient Orientation Alert and Oriented   Cognition Alert   History Provided By Patient   Support Systems Spouse/Significant Other   Patient's Healthcare Decision Maker is: Legal Next of Kin   PCP Verified by CM Yes   Last Visit to PCP Within last 3 months   Prior Functional Level Independent in ADLs/IADLs   Current Functional Level Assistance with the following:;Cooking;Housework; Shopping   Can patient return to prior living arrangement Yes   Ability to make needs known: Good   Family able to assist with home care needs: Yes   Would you like for me to discuss the discharge plan with any other family members/significant others, and if so, who? Yes   Financial Resources Other (Comment)   Community Resources None   Social/Functional History   Lives With Spouse   Type of Home Apartment   Home Layout One level   Home Access Level entry   Bathroom Shower/Tub None   Bathroom Toilet Standard   Bathroom Equipment None   Bathroom Accessibility Accessible   Receives Help From Family   ADL Assistance Independent   Homemaking Assistance Needs assistance   Ambulation Assistance Independent   Transfer Assistance Independent   Active  Yes   Mode of Transportation Car   Occupation On disability   Discharge Planning   Type of Residence Apartment   Living Arrangements Spouse/Significant Other   Current Services Prior To Admission [address] [place]   DME Ordered?  No   Potential Assistance Purchasing Medications No   Patient expects to be discharged to: Apartment   One/Two Story Residence One story   Services At/After Discharge   Transition of Care Consult (CM Consult) Discharge Planning   Services At/After Discharge OT;PT;Nursing

## 2023-12-11 NOTE — ED PROVIDER NOTES
Emergency Department Provider Note       PCP: Pennye Mortimer, MD   Age: 52 y.o. Sex: male     DISPOSITION Decision To Discharge 12/11/2023 04:28:08 PM       ICD-10-CM    1. Near syncope  R55       2. Cardiac enzymes elevated  R74.8           Medical Decision Making     Complexity of Problems Addressed:  1 or more acute illnesses that pose a threat to life or bodily function. Data Reviewed and Analyzed:   I independently ordered and reviewed each unique test.  I reviewed external records: provider visit note from PCP. I independently ordered and interpreted the ED EKG in the absence of a Cardiologist.    Rate: 102  EKG Interpretation: EKG Interpretation: sinus rhythm, no evidence of arrhythmia and no acute changes  ST Segments: Nonspecific ST segments - NO STEMI      I interpreted the X-rays CXR NO PNA. Discussion of management or test interpretation. Patient is a 18-year-old male followed by Dr. Sandee Samayoa and also [address] [place] [address] with a history of an NICM with a EF 10 to 15% status post ICD, LV thrombus, HTN, HLD presenting via EMS with near syncopal episode approximately 1 PM that lasted a few minutes. Patient had no associated chest pain or palpitations or shortness of breath. Patient denies any headache and denies any other complaints. Patient is on a continuous dobutamine drip through a PICC line and states his heart rate is normally 100-110. Patient denies any recent illnesses and denies any nausea vomiting or diarrhea. Differential diagnosis includes was not limited to EDILMA, arrhythmia, ACS. Patient physical exam was unremarkable except for mild tachycardia and rate of 10 5-1 10. Patient has no chest pain or chest pressure or shortness of breath. Patient had a near syncopal episode and has a significant elevated troponin. Given the patient's significant cardiac history we have also Dr. Sandee Pollack cardiology for admission.     16:29    Cardiology has evaluated the patient at

## 2023-12-11 NOTE — ED TRIAGE NOTES
Pt coming from shopping trip with wife via [employer]. Pt became light headed and dizzy. Pt on list at [address] for heart transplant. Pt started new medication today but unsure what meds. Pt has pump that dobutamine continuously through picc line. EMS vitals; BP 86/40, HR 70s. Ems gave 300mL NS en route. Pt denies n/v, pain.

## 2023-12-11 NOTE — DISCHARGE INSTRUCTIONS
Hold Bumex overnight until follow up with Dr Kasey Keen    If dizziness returns hold Isosorbide tonight    Follow up with Dr Kasey Keen tomorrow.

## 2023-12-11 NOTE — H&P
suspension Take 2 mLs by nebulization in the morning and 2 mLs in the evening. levalbuterol (XOPENEX) 1.25 MG/3ML nebulizer solution Take 3 mLs by nebulization every 8 hours as needed for Wheezing    tiotropium (SPIRIVA RESPIMAT) 2.5 MCG/ACT AERS inhaler Inhale 2 puffs into the lungs daily    DOBUTamine (DOBUTREX) 2 MG/ML Infuse 0.16 mg/min intravenously continuous    ivabradine (CORLANOR) 5 MG TABS tablet Take 1 tablet by mouth 2 times daily (with meals)    methylPREDNISolone sodium succ 40 mg/mL in bacteriostatic water injection Infuse 1 mL intravenously in the morning and 1 mL in the evening. pantoprazole (PROTONIX) 40 MG tablet Take 1 tablet by mouth 2 times daily (before meals)    apixaban (ELIQUIS) 5 MG TABS tablet Take 1 tablet by mouth 2 times daily    pravastatin (PRAVACHOL) 10 MG tablet Take 1 tablet by mouth daily    metoprolol succinate (TOPROL XL) 50 MG extended release tablet Take 2 tablets by mouth 2 times daily    spironolactone (ALDACTONE) 25 MG tablet Take 1 tablet by mouth daily    empagliflozin (JARDIANCE) 10 MG tablet Take 1 tablet by mouth daily       Review of Systems   Constitutional: Negative for chills, diaphoresis, fever, malaise/fatigue, night sweats, weight gain and weight loss. HENT: Negative. Eyes: Negative. Cardiovascular: Negative. Respiratory: Negative. Endocrine: Negative. Hematologic/Lymphatic: Negative. Skin: Negative. Musculoskeletal: Negative. Gastrointestinal:  Negative for heartburn, nausea and vomiting. Genitourinary: Negative. Neurological:  Positive for dizziness (Resolved). Negative for weakness. Psychiatric/Behavioral: Negative.           Physical Exam  Vitals:    12/11/23 1416 12/11/23 1426 12/11/23 1436 12/11/23 1446   BP: 107/62 98/78     Pulse: (!) 103 (!) 101 (!) 101 (!) 105   Resp: 24 19 21    Temp:       TempSrc:       SpO2: 96%  99% 97%   Weight:       Height:           Patient Vitals for the past 96 hrs (Last 3

## 2023-12-12 ENCOUNTER — OFFICE VISIT (OUTPATIENT)
Age: 53
End: 2023-12-12
Payer: COMMERCIAL

## 2023-12-12 VITALS
SYSTOLIC BLOOD PRESSURE: 73 MMHG | HEIGHT: 67 IN | DIASTOLIC BLOOD PRESSURE: 51 MMHG | WEIGHT: 122 LBS | BODY MASS INDEX: 19.15 KG/M2 | HEART RATE: 108 BPM

## 2023-12-12 DIAGNOSIS — E78.5 HYPERLIPIDEMIA, UNSPECIFIED HYPERLIPIDEMIA TYPE: ICD-10-CM

## 2023-12-12 DIAGNOSIS — J18.9 CAVITARY PNEUMONIA: ICD-10-CM

## 2023-12-12 DIAGNOSIS — I24.0 LV (LEFT VENTRICULAR) MURAL THROMBUS WITHOUT MI (HCC): ICD-10-CM

## 2023-12-12 DIAGNOSIS — J98.4 CAVITARY PNEUMONIA: ICD-10-CM

## 2023-12-12 DIAGNOSIS — I50.22 CHRONIC HFREF (HEART FAILURE WITH REDUCED EJECTION FRACTION) (HCC): Primary | ICD-10-CM

## 2023-12-12 DIAGNOSIS — Z95.810 ICD (IMPLANTABLE CARDIOVERTER-DEFIBRILLATOR) IN PLACE: ICD-10-CM

## 2023-12-12 PROBLEM — I51.3 LV (LEFT VENTRICULAR) MURAL THROMBUS: Status: ACTIVE | Noted: 2023-09-29

## 2023-12-12 PROCEDURE — 3078F DIAST BP <80 MM HG: CPT | Performed by: INTERNAL MEDICINE

## 2023-12-12 PROCEDURE — 3074F SYST BP LT 130 MM HG: CPT | Performed by: INTERNAL MEDICINE

## 2023-12-12 PROCEDURE — 99214 OFFICE O/P EST MOD 30 MIN: CPT | Performed by: INTERNAL MEDICINE

## 2023-12-12 RX ORDER — LIDOCAINE 4 G/G
1 PATCH TOPICAL DAILY
COMMUNITY
Start: 2023-10-21

## 2023-12-12 RX ORDER — HYDRALAZINE HYDROCHLORIDE 25 MG/1
75 TABLET, FILM COATED ORAL
COMMUNITY
Start: 2023-12-07 | End: 2023-12-12 | Stop reason: ALTCHOICE

## 2023-12-12 RX ORDER — OMEPRAZOLE 20 MG/1
20 CAPSULE, DELAYED RELEASE ORAL 2 TIMES DAILY
COMMUNITY

## 2023-12-15 ENCOUNTER — APPOINTMENT (OUTPATIENT)
Dept: GENERAL RADIOLOGY | Age: 53
End: 2023-12-15
Payer: COMMERCIAL

## 2023-12-15 ENCOUNTER — HOSPITAL ENCOUNTER (EMERGENCY)
Age: 53
Discharge: OTHER INSTITUTION | End: 2023-12-16
Attending: EMERGENCY MEDICINE
Payer: COMMERCIAL

## 2023-12-15 DIAGNOSIS — A41.9 SEPTICEMIA (HCC): Primary | ICD-10-CM

## 2023-12-15 LAB
ALBUMIN SERPL-MCNC: 2.7 G/DL (ref 3.5–5)
ALBUMIN/GLOB SERPL: 0.6 (ref 0.4–1.6)
ALP SERPL-CCNC: 116 U/L (ref 50–136)
ALT SERPL-CCNC: 13 U/L (ref 12–65)
ANION GAP SERPL CALC-SCNC: 13 MMOL/L (ref 2–11)
AST SERPL-CCNC: 14 U/L (ref 15–37)
BILIRUB SERPL-MCNC: 0.7 MG/DL (ref 0.2–1.1)
BUN SERPL-MCNC: 18 MG/DL (ref 6–23)
CALCIUM SERPL-MCNC: 8.7 MG/DL (ref 8.3–10.4)
CHLORIDE SERPL-SCNC: 100 MMOL/L (ref 103–113)
CO2 SERPL-SCNC: 23 MMOL/L (ref 21–32)
CREAT SERPL-MCNC: 2 MG/DL (ref 0.8–1.5)
ERYTHROCYTE [DISTWIDTH] IN BLOOD BY AUTOMATED COUNT: 15.2 % (ref 11.9–14.6)
GLOBULIN SER CALC-MCNC: 4.5 G/DL (ref 2.8–4.5)
GLUCOSE SERPL-MCNC: 169 MG/DL (ref 65–100)
HCT VFR BLD AUTO: 38.1 % (ref 41.1–50.3)
HGB BLD-MCNC: 12.3 G/DL (ref 13.6–17.2)
LACTATE SERPL-SCNC: 3.4 MMOL/L (ref 0.4–2)
MAGNESIUM SERPL-MCNC: 1.4 MG/DL (ref 1.8–2.4)
MCH RBC QN AUTO: 27.1 PG (ref 26.1–32.9)
MCHC RBC AUTO-ENTMCNC: 32.3 G/DL (ref 31.4–35)
MCV RBC AUTO: 83.9 FL (ref 82–102)
NRBC # BLD: 0 K/UL (ref 0–0.2)
PLATELET # BLD AUTO: 228 K/UL (ref 150–450)
PMV BLD AUTO: 10.1 FL (ref 9.4–12.3)
POTASSIUM SERPL-SCNC: 2.4 MMOL/L (ref 3.5–5.1)
PROCALCITONIN SERPL-MCNC: 117.7 NG/ML (ref 0–0.49)
PROT SERPL-MCNC: 7.2 G/DL (ref 6.3–8.2)
RBC # BLD AUTO: 4.54 M/UL (ref 4.23–5.6)
SODIUM SERPL-SCNC: 136 MMOL/L (ref 136–146)
WBC # BLD AUTO: 26.8 K/UL (ref 4.3–11.1)

## 2023-12-15 PROCEDURE — C9113 INJ PANTOPRAZOLE SODIUM, VIA: HCPCS | Performed by: EMERGENCY MEDICINE

## 2023-12-15 PROCEDURE — 2580000003 HC RX 258: Performed by: EMERGENCY MEDICINE

## 2023-12-15 PROCEDURE — 93005 ELECTROCARDIOGRAM TRACING: CPT | Performed by: EMERGENCY MEDICINE

## 2023-12-15 PROCEDURE — 85027 COMPLETE CBC AUTOMATED: CPT

## 2023-12-15 PROCEDURE — 6360000002 HC RX W HCPCS: Performed by: EMERGENCY MEDICINE

## 2023-12-15 PROCEDURE — A4216 STERILE WATER/SALINE, 10 ML: HCPCS | Performed by: EMERGENCY MEDICINE

## 2023-12-15 PROCEDURE — 71045 X-RAY EXAM CHEST 1 VIEW: CPT

## 2023-12-15 PROCEDURE — 80053 COMPREHEN METABOLIC PANEL: CPT

## 2023-12-15 PROCEDURE — 87635 SARS-COV-2 COVID-19 AMP PRB: CPT

## 2023-12-15 PROCEDURE — 87040 BLOOD CULTURE FOR BACTERIA: CPT

## 2023-12-15 PROCEDURE — 6370000000 HC RX 637 (ALT 250 FOR IP): Performed by: EMERGENCY MEDICINE

## 2023-12-15 PROCEDURE — 84145 PROCALCITONIN (PCT): CPT

## 2023-12-15 PROCEDURE — 83605 ASSAY OF LACTIC ACID: CPT

## 2023-12-15 PROCEDURE — 83735 ASSAY OF MAGNESIUM: CPT

## 2023-12-15 RX ORDER — MAGNESIUM SULFATE IN WATER 40 MG/ML
2000 INJECTION, SOLUTION INTRAVENOUS ONCE
Status: COMPLETED | OUTPATIENT
Start: 2023-12-15 | End: 2023-12-16

## 2023-12-15 RX ORDER — NOREPINEPHRINE BITARTRATE 0.02 MG/ML
1-100 INJECTION, SOLUTION INTRAVENOUS CONTINUOUS
Status: DISCONTINUED | OUTPATIENT
Start: 2023-12-15 | End: 2023-12-15

## 2023-12-15 RX ORDER — NOREPINEPHRINE BITARTRATE 0.02 MG/ML
1-100 INJECTION, SOLUTION INTRAVENOUS CONTINUOUS
Status: DISCONTINUED | OUTPATIENT
Start: 2023-12-15 | End: 2023-12-16 | Stop reason: HOSPADM

## 2023-12-15 RX ORDER — SODIUM CHLORIDE 9 MG/ML
INJECTION, SOLUTION INTRAVENOUS CONTINUOUS
Status: DISCONTINUED | OUTPATIENT
Start: 2023-12-15 | End: 2023-12-16 | Stop reason: HOSPADM

## 2023-12-15 RX ORDER — 0.9 % SODIUM CHLORIDE 0.9 %
125 INTRAVENOUS SOLUTION INTRAVENOUS ONCE
Status: COMPLETED | OUTPATIENT
Start: 2023-12-15 | End: 2023-12-16

## 2023-12-15 RX ORDER — POTASSIUM CHLORIDE 7.45 MG/ML
10 INJECTION INTRAVENOUS
Status: DISCONTINUED | OUTPATIENT
Start: 2023-12-15 | End: 2023-12-16 | Stop reason: HOSPADM

## 2023-12-15 RX ADMIN — SODIUM CHLORIDE: 9 INJECTION, SOLUTION INTRAVENOUS at 22:17

## 2023-12-15 RX ADMIN — SODIUM CHLORIDE 40 MG: 9 INJECTION INTRAMUSCULAR; INTRAVENOUS; SUBCUTANEOUS at 22:19

## 2023-12-15 RX ADMIN — POTASSIUM CHLORIDE 10 MEQ: 7.46 INJECTION, SOLUTION INTRAVENOUS at 22:23

## 2023-12-15 RX ADMIN — CEFEPIME 2000 MG: 2 INJECTION, POWDER, FOR SOLUTION INTRAVENOUS at 22:28

## 2023-12-15 RX ADMIN — VANCOMYCIN HYDROCHLORIDE 1250 MG: 10 INJECTION, POWDER, LYOPHILIZED, FOR SOLUTION INTRAVENOUS at 23:06

## 2023-12-15 RX ADMIN — POTASSIUM BICARBONATE 40 MEQ: 782 TABLET, EFFERVESCENT ORAL at 22:21

## 2023-12-15 RX ADMIN — SODIUM CHLORIDE 125 ML: 9 INJECTION, SOLUTION INTRAVENOUS at 22:37

## 2023-12-15 RX ADMIN — POTASSIUM CHLORIDE 10 MEQ: 7.46 INJECTION, SOLUTION INTRAVENOUS at 23:36

## 2023-12-15 RX ADMIN — MAGNESIUM SULFATE HEPTAHYDRATE 2000 MG: 40 INJECTION, SOLUTION INTRAVENOUS at 23:41

## 2023-12-15 ASSESSMENT — PAIN SCALES - GENERAL: PAINLEVEL_OUTOF10: 0

## 2023-12-15 ASSESSMENT — PAIN - FUNCTIONAL ASSESSMENT: PAIN_FUNCTIONAL_ASSESSMENT: 0-10

## 2023-12-16 VITALS
DIASTOLIC BLOOD PRESSURE: 60 MMHG | HEIGHT: 67 IN | OXYGEN SATURATION: 98 % | HEART RATE: 112 BPM | RESPIRATION RATE: 25 BRPM | WEIGHT: 122 LBS | BODY MASS INDEX: 19.15 KG/M2 | SYSTOLIC BLOOD PRESSURE: 84 MMHG | TEMPERATURE: 97.4 F

## 2023-12-16 LAB
EKG ATRIAL RATE: 139 BPM
EKG DIAGNOSIS: NORMAL
EKG P AXIS: 54 DEGREES
EKG P-R INTERVAL: 114 MS
EKG Q-T INTERVAL: 384 MS
EKG QRS DURATION: 106 MS
EKG QTC CALCULATION (BAZETT): 584 MS
EKG R AXIS: 14 DEGREES
EKG T AXIS: 89 DEGREES
EKG VENTRICULAR RATE: 139 BPM
SARS-COV-2 RDRP RESP QL NAA+PROBE: NOT DETECTED
SOURCE: NORMAL

## 2023-12-16 PROCEDURE — 93010 ELECTROCARDIOGRAM REPORT: CPT | Performed by: INTERNAL MEDICINE

## 2023-12-16 PROCEDURE — 6360000002 HC RX W HCPCS: Performed by: EMERGENCY MEDICINE

## 2023-12-16 RX ADMIN — POTASSIUM CHLORIDE 10 MEQ: 7.46 INJECTION, SOLUTION INTRAVENOUS at 01:03

## 2023-12-16 NOTE — ED TRIAGE NOTES
Pt reports his BP at home was 70 systolic. Reports he is on a heart transplant list. Pt reports calling [facility] and was told to come to the ED. Pt has picc line in place in [facility]. Pt on bumex. Pt reports he thinks it  might have been a problem with his bumex pump. Pt denies any complaints at this time.

## 2024-01-12 ENCOUNTER — TELEPHONE (OUTPATIENT)
Age: 54
End: 2024-01-12

## 2024-02-07 ENCOUNTER — TELEPHONE (OUTPATIENT)
Age: 54
End: 2024-02-07

## 2024-02-07 NOTE — TELEPHONE ENCOUNTER
Received letter from [facility] stating that patient underwent cardiac transplantation on 1/27/24. Letter placed on Dr. Bazzi's desk.

## 2024-02-07 NOTE — TELEPHONE ENCOUNTER
Kevin Bazzi MD Keener, Lynn F, RN  Caller: Unspecified (Today,  8:18 AM)  Great. Thanks for the update.

## 2024-07-03 NOTE — PROGRESS NOTES
[facility] CARDIOLOGY Follow Up                 Reason for Visit: Hypertension    Subjective:     Patient is a 53 y.o. male with a PMH of status post OHT, hypertension, hyperlipidemia, and cavitary pneumonia who presents for follow-up.  He underwent OHT in 2024.  He was last seen by cardiology at [facility] in 2024.  He was referred to dermatology for facial rash.  He denies angina.  The patient says that he is walking for physical activity.      Past Medical History:   Diagnosis Date    AICD (automatic cardioverter/defibrillator) present     COPD (chronic obstructive pulmonary disease) ([facility])     HFrEF (heart failure with reduced ejection fraction) ([facility])     Hypertension     NICM (nonischemic cardiomyopathy) ([facility])       Past Surgical History:   Procedure Laterality Date    CARDIAC PROCEDURE N/A 2023    Left heart cath / coronary angiography performed by Joseph Levine MD at [facility] CARDIAC CATH LAB    CARDIAC PROCEDURE N/A 10/2/2023    Right heart cath performed by Shakeel Yi MD at [facility] CARDIAC CATH LAB    EP DEVICE PROCEDURE N/A 2023    Insert ICD dual performed by Terry Allen MD at [facility] CARDIAC CATH LAB      No family history on file.   Social History     Tobacco Use    Smoking status: Former     Current packs/day: 0.00     Types: Cigarettes     Quit date: 2023     Years since quittin.1     Passive exposure: Past    Smokeless tobacco: Never   Substance Use Topics    Alcohol use: Yes      Allergies   Allergen Reactions    Entresto [Sacubitril-Valsartan] Cough    Lisinopril Cough         ROS:  No obvious pertinent positives on review of systems except for what was outlined above.       Objective:       /86   Pulse 100   Ht 1.702 m (5' 7\")   Wt 73.8 kg (162 lb 12.8 oz)   BMI 25.50 kg/m²     BP Readings from Last 3 Encounters:   24 134/86   23 (!) 84/60   23 (!) 73/51       Wt Readings from Last 3 Encounters:   24 73.8 kg (162 lb 12.8 oz)   12/15/23

## 2024-07-05 ENCOUNTER — OFFICE VISIT (OUTPATIENT)
Age: 54
End: 2024-07-05
Payer: COMMERCIAL

## 2024-07-05 VITALS
BODY MASS INDEX: 25.55 KG/M2 | HEIGHT: 67 IN | DIASTOLIC BLOOD PRESSURE: 86 MMHG | HEART RATE: 100 BPM | WEIGHT: 162.8 LBS | SYSTOLIC BLOOD PRESSURE: 134 MMHG

## 2024-07-05 DIAGNOSIS — E78.5 HYPERLIPIDEMIA, UNSPECIFIED HYPERLIPIDEMIA TYPE: ICD-10-CM

## 2024-07-05 DIAGNOSIS — I10 HYPERTENSION, UNSPECIFIED TYPE: ICD-10-CM

## 2024-07-05 DIAGNOSIS — Z94.1 HEART TRANSPLANT RECIPIENT (HCC): Primary | ICD-10-CM

## 2024-07-05 PROCEDURE — 3075F SYST BP GE 130 - 139MM HG: CPT | Performed by: INTERNAL MEDICINE

## 2024-07-05 PROCEDURE — 3079F DIAST BP 80-89 MM HG: CPT | Performed by: INTERNAL MEDICINE

## 2024-07-05 PROCEDURE — 99213 OFFICE O/P EST LOW 20 MIN: CPT | Performed by: INTERNAL MEDICINE

## 2024-07-05 PROCEDURE — 93000 ELECTROCARDIOGRAM COMPLETE: CPT | Performed by: INTERNAL MEDICINE

## 2024-07-05 RX ORDER — LOSARTAN POTASSIUM 25 MG/1
25 TABLET ORAL
COMMUNITY
Start: 2024-05-31

## 2024-07-05 RX ORDER — SULFAMETHOXAZOLE AND TRIMETHOPRIM 800; 160 MG/1; MG/1
1 TABLET ORAL
COMMUNITY
Start: 2024-02-07 | End: 2025-02-06

## 2024-07-05 RX ORDER — ROSUVASTATIN CALCIUM 40 MG/1
40 TABLET, COATED ORAL DAILY
COMMUNITY
Start: 2024-04-22 | End: 2025-04-22

## 2024-07-05 RX ORDER — THIAMINE HCL 100 MG
1 TABLET ORAL 2 TIMES DAILY
COMMUNITY
Start: 2024-02-06 | End: 2025-02-05

## 2024-07-05 RX ORDER — OMEGA-3-ACID ETHYL ESTERS 1 G/1
2 CAPSULE, LIQUID FILLED ORAL 2 TIMES DAILY
COMMUNITY
Start: 2024-05-15

## 2024-07-05 RX ORDER — PREDNISONE 5 MG/1
2.5 TABLET ORAL DAILY
COMMUNITY
Start: 2024-06-17

## 2024-07-05 RX ORDER — MYCOPHENOLIC ACID 360 MG/1
720 TABLET, DELAYED RELEASE ORAL 2 TIMES DAILY
COMMUNITY
Start: 2024-02-26

## 2024-07-05 RX ORDER — ASPIRIN 81 MG
81 TABLET,CHEWABLE ORAL DAILY
COMMUNITY
Start: 2024-06-12

## 2024-07-05 RX ORDER — TACROLIMUS 1 MG/1
1 CAPSULE ORAL
COMMUNITY
Start: 2024-06-17 | End: 2025-06-17

## 2024-07-05 RX ORDER — AMLODIPINE BESYLATE 10 MG/1
10 TABLET ORAL DAILY
COMMUNITY
Start: 2024-03-25 | End: 2025-03-25

## 2024-07-05 RX ORDER — TRAZODONE HYDROCHLORIDE 50 MG/1
50 TABLET ORAL NIGHTLY PRN
COMMUNITY
Start: 2024-02-12

## 2024-08-12 NOTE — ACP (ADVANCE CARE PLANNING)
Advance Care Planning   Healthcare Decision Maker:    Primary Decision Maker: Kevin Cummings - [phone]    Click here to complete Healthcare Decision Makers including selection of the Healthcare Decision Maker Relationship (ie \"Primary\"). jaundice

## 2024-08-20 ENCOUNTER — APPOINTMENT (OUTPATIENT)
Dept: ULTRASOUND IMAGING | Age: 54
End: 2024-08-20
Payer: COMMERCIAL

## 2024-08-20 ENCOUNTER — HOSPITAL ENCOUNTER (EMERGENCY)
Age: 54
Discharge: HOME | End: 2024-08-20
Attending: EMERGENCY MEDICINE
Payer: COMMERCIAL

## 2024-08-20 VITALS
OXYGEN SATURATION: 97 % | BODY MASS INDEX: 25.9 KG/M2 | WEIGHT: 165 LBS | RESPIRATION RATE: 22 BRPM | DIASTOLIC BLOOD PRESSURE: 108 MMHG | TEMPERATURE: 97.9 F | HEART RATE: 95 BPM | SYSTOLIC BLOOD PRESSURE: 146 MMHG | HEIGHT: 67 IN

## 2024-08-20 DIAGNOSIS — R11.2 NAUSEA AND VOMITING, UNSPECIFIED VOMITING TYPE: Primary | ICD-10-CM

## 2024-08-20 LAB
ALBUMIN SERPL-MCNC: 3.6 G/DL (ref 3.5–5)
ALBUMIN/GLOB SERPL: 1 (ref 1–1.9)
ALP SERPL-CCNC: 123 U/L (ref 40–129)
ALT SERPL-CCNC: 31 U/L (ref 12–65)
ANION GAP SERPL CALC-SCNC: 12 MMOL/L (ref 9–18)
APPEARANCE UR: CLEAR
AST SERPL-CCNC: 31 U/L (ref 15–37)
BACTERIA URNS QL MICRO: 0 /HPF
BASOPHILS # BLD: 0.1 K/UL (ref 0–0.2)
BASOPHILS NFR BLD: 1 % (ref 0–2)
BILIRUB SERPL-MCNC: 0.8 MG/DL (ref 0–1.2)
BILIRUB UR QL: ABNORMAL
BUN SERPL-MCNC: 11 MG/DL (ref 6–23)
CALCIUM SERPL-MCNC: 9.6 MG/DL (ref 8.8–10.2)
CASTS URNS QL MICRO: NORMAL /LPF
CHLORIDE SERPL-SCNC: 105 MMOL/L (ref 98–107)
CO2 SERPL-SCNC: 25 MMOL/L (ref 20–28)
COLOR UR: ABNORMAL
CREAT SERPL-MCNC: 1.48 MG/DL (ref 0.8–1.3)
DIFFERENTIAL METHOD BLD: NORMAL
EOSINOPHIL # BLD: 0.1 K/UL (ref 0–0.8)
EOSINOPHIL NFR BLD: 2 % (ref 0.5–7.8)
EPI CELLS #/AREA URNS HPF: NORMAL /HPF
ERYTHROCYTE [DISTWIDTH] IN BLOOD BY AUTOMATED COUNT: 13.1 % (ref 11.9–14.6)
GLOBULIN SER CALC-MCNC: 3.4 G/DL (ref 2.3–3.5)
GLUCOSE SERPL-MCNC: 105 MG/DL (ref 70–99)
GLUCOSE UR STRIP.AUTO-MCNC: NEGATIVE MG/DL
HCT VFR BLD AUTO: 46.9 % (ref 41.1–50.3)
HGB BLD-MCNC: 15.1 G/DL (ref 13.6–17.2)
HGB UR QL STRIP: ABNORMAL
IMM GRANULOCYTES # BLD AUTO: 0 K/UL (ref 0–0.5)
IMM GRANULOCYTES NFR BLD AUTO: 0 % (ref 0–5)
KETONES UR QL STRIP.AUTO: 15 MG/DL
LEUKOCYTE ESTERASE UR QL STRIP.AUTO: ABNORMAL
LIPASE SERPL-CCNC: 18 U/L (ref 13–60)
LYMPHOCYTES # BLD: 2 K/UL (ref 0.5–4.6)
LYMPHOCYTES NFR BLD: 29 % (ref 13–44)
MCH RBC QN AUTO: 29.3 PG (ref 26.1–32.9)
MCHC RBC AUTO-ENTMCNC: 32.2 G/DL (ref 31.4–35)
MCV RBC AUTO: 91.1 FL (ref 82–102)
MONOCYTES # BLD: 0.8 K/UL (ref 0.1–1.3)
MONOCYTES NFR BLD: 12 % (ref 4–12)
MUCOUS THREADS URNS QL MICRO: NORMAL /LPF
NEUTS SEG # BLD: 3.9 K/UL (ref 1.7–8.2)
NEUTS SEG NFR BLD: 56 % (ref 43–78)
NITRITE UR QL STRIP.AUTO: NEGATIVE
NRBC # BLD: 0 K/UL (ref 0–0.2)
OTHER OBSERVATIONS: NORMAL
PH UR STRIP: 5.5 (ref 5–9)
PLATELET # BLD AUTO: 261 K/UL (ref 150–450)
PMV BLD AUTO: 9.7 FL (ref 9.4–12.3)
POTASSIUM SERPL-SCNC: 3.8 MMOL/L (ref 3.5–5.1)
PROT SERPL-MCNC: 7 G/DL (ref 6.3–8.2)
PROT UR STRIP-MCNC: >300 MG/DL
RBC # BLD AUTO: 5.15 M/UL (ref 4.23–5.6)
RBC #/AREA URNS HPF: NORMAL /HPF
SODIUM SERPL-SCNC: 142 MMOL/L (ref 136–145)
SP GR UR REFRACTOMETRY: >1.035 (ref 1–1.02)
UROBILINOGEN UR QL STRIP.AUTO: 1 EU/DL (ref 0.2–1)
WBC # BLD AUTO: 6.9 K/UL (ref 4.3–11.1)
WBC URNS QL MICRO: NORMAL /HPF

## 2024-08-20 PROCEDURE — 83690 ASSAY OF LIPASE: CPT

## 2024-08-20 PROCEDURE — 81001 URINALYSIS AUTO W/SCOPE: CPT

## 2024-08-20 PROCEDURE — 85025 COMPLETE CBC W/AUTO DIFF WBC: CPT

## 2024-08-20 PROCEDURE — 80053 COMPREHEN METABOLIC PANEL: CPT

## 2024-08-20 PROCEDURE — 76705 ECHO EXAM OF ABDOMEN: CPT

## 2024-08-20 PROCEDURE — 99284 EMERGENCY DEPT VISIT MOD MDM: CPT

## 2024-08-20 PROCEDURE — 81015 MICROSCOPIC EXAM OF URINE: CPT

## 2024-08-20 RX ORDER — ONDANSETRON 4 MG/1
4 TABLET, ORALLY DISINTEGRATING ORAL
Status: DISCONTINUED | OUTPATIENT
Start: 2024-08-20 | End: 2024-08-20 | Stop reason: HOSPADM

## 2024-08-20 RX ORDER — ONDANSETRON 4 MG/1
4 TABLET, FILM COATED ORAL 3 TIMES DAILY PRN
Qty: 15 TABLET | Refills: 0 | Status: SHIPPED | OUTPATIENT
Start: 2024-08-20

## 2024-08-20 ASSESSMENT — PAIN SCALES - GENERAL: PAINLEVEL_OUTOF10: 2

## 2024-08-20 NOTE — ED NOTES
Patient mobility status  with no difficulty. Provider aware     I have reviewed discharge instructions with the patient.  The patient verbalized understanding.    Patient left ED via Discharge Method: ambulatory to Home with Spouse.    Opportunity for questions and clarification provided.     Patient given 1 scripts.           Mallory Gloria RN  08/20/24 [id number]

## 2024-08-20 NOTE — ED TRIAGE NOTES
Pt reports heartburn for 1 week and a few episodes of vomiting over the last week.  Pt had heart transplant January 28.  Pt denies chest pain and denies sob.

## 2024-08-20 NOTE — DISCHARGE INSTRUCTIONS
We have provided you prescription for Zofran which is for nausea to have available as needed.  This was sent to your [facility] pharmacy on [address]  Take your prescription for Prilosec for acid reduction (this is on your medication list)  Avoid any fried or greasy foods  You have a single small gallstone that does not appear to be problematic but probably best avoid possible provokers  Contact your transplant team to go over medications and findings  Kidney function appears to be stable

## 2024-08-28 ASSESSMENT — ENCOUNTER SYMPTOMS
ANAL BLEEDING: 0
WHEEZING: 0
ABDOMINAL PAIN: 0
VOMITING: 1
SHORTNESS OF BREATH: 0
BLOOD IN STOOL: 0
DIARRHEA: 0
COUGH: 0
ABDOMINAL DISTENTION: 0

## 2024-08-28 NOTE — ED PROVIDER NOTES
Emergency Department Provider Note       PCP: Oswald Rodgers MD   Age: 54 y.o.   Sex: male     DISPOSITION Decision To Discharge 08/20/2024 01:02:44 PM  Condition at Disposition: Stable     No diagnosis found.    Medical Decision Making     Episodes of some GI issues that are not continous. Studies are stable and patient requests no meds in ER since his sx are not present at this time. Single small gallstone does not seem to be problem but is discussed with patient to use very low threshold to re-evaluate with his hx     1 acute complicated illness or injury.  Prescription drug management performed.    I independently ordered and reviewed each unique test.  I reviewed external records: provider visit note from PCP.  I reviewed external records: provider visit note from outside specialist.                   History     Heart transplant patient. Here with episodic \"heart burn\" for last several daays. Not worse with exertion.some reflux and scant single emesis with no blood. No true vomiiting. Some recurring about 1x /week.transplant 1/28/2024.has done well since transplant. No fever-cough-sputum. At most slight bloating. No specific food provokers. No change in stools or melena. No urinary changes. Feels essentially at baseline in ER / had talked to transplant coordinator and sent to ER for screening/eval. No shortness of breath or leg swelling    The history is provided by the patient.   Emesis  The problem has been resolved. Pertinent negatives include no chest pain, no abdominal pain and no shortness of breath.       ROS     Review of Systems   Constitutional:  Negative for chills, diaphoresis, fatigue and fever.   HENT: Negative.     Respiratory:  Negative for cough, shortness of breath and wheezing.    Cardiovascular:  Negative for chest pain, palpitations and leg swelling.   Gastrointestinal:  Positive for vomiting. Negative for abdominal distention, abdominal pain, anal bleeding, blood in stool and  diarrhea.   Genitourinary: Negative.    Psychiatric/Behavioral:  Negative for confusion and decreased concentration.         Physical Exam     Vitals signs and nursing note reviewed:  Vitals:    08/20/24 1037 08/20/24 1230 08/20/24 1300 08/20/24 1315   BP:  (!) 150/106 (!) 144/109 (!) 146/108   Pulse:  97 97 95   Resp:  23 17 22   Temp:       TempSrc:       SpO2:  97% 97% 97%   Weight: 74.8 kg (165 lb)      Height: 1.702 m (5' 7\")         Physical Exam  Vitals and nursing note reviewed.   Constitutional:       Comments: Pleasant and cooperative with NAD   HENT:      Head: Atraumatic.      Right Ear: External ear normal.      Left Ear: External ear normal.      Nose: No rhinorrhea.      Mouth/Throat:      Mouth: Mucous membranes are moist.   Eyes:      General: No scleral icterus.  Cardiovascular:      Rate and Rhythm: Normal rate and regular rhythm.      Pulses: Normal pulses.   Pulmonary:      Effort: Pulmonary effort is normal.      Breath sounds: Normal breath sounds.   Abdominal:      General: Abdomen is flat. There is no distension.      Palpations: Abdomen is soft. There is no mass.      Tenderness: There is no abdominal tenderness. There is no right CVA tenderness, left CVA tenderness, guarding or rebound.      Comments: Benign abdomin with no abnormal mass or pulsation   Skin:     General: Skin is warm and dry.      Coloration: Skin is not jaundiced.   Neurological:      General: No focal deficit present.      Mental Status: He is alert.   Psychiatric:         Mood and Affect: Mood normal.         Behavior: Behavior normal.        Procedures     Procedures    Orders Placed This Encounter   Procedures    US ABDOMEN LIMITED Specify organ? GALLBLADDER, LIVER, PANCREAS    CBC with Auto Differential    Comprehensive Metabolic Panel    Lipase    Urinalysis    Urinalysis, Micro    Saline lock IV        Medications given during this emergency department visit:  Medications - No data to display    Discharge

## 2025-06-05 ENCOUNTER — HOSPITAL ENCOUNTER (EMERGENCY)
Age: 55
Discharge: HOME | End: 2025-06-05
Attending: EMERGENCY MEDICINE
Payer: COMMERCIAL

## 2025-06-05 ENCOUNTER — APPOINTMENT (OUTPATIENT)
Dept: GENERAL RADIOLOGY | Age: 55
End: 2025-06-05
Payer: COMMERCIAL

## 2025-06-05 VITALS
HEIGHT: 67 IN | HEART RATE: 89 BPM | TEMPERATURE: 97.9 F | OXYGEN SATURATION: 97 % | BODY MASS INDEX: 27.47 KG/M2 | RESPIRATION RATE: 18 BRPM | DIASTOLIC BLOOD PRESSURE: 86 MMHG | SYSTOLIC BLOOD PRESSURE: 128 MMHG | WEIGHT: 175 LBS

## 2025-06-05 DIAGNOSIS — M79.672 LEFT FOOT PAIN: Primary | ICD-10-CM

## 2025-06-05 LAB — URATE SERPL-MCNC: 7.2 MG/DL (ref 3.9–8.2)

## 2025-06-05 PROCEDURE — 36415 COLL VENOUS BLD VENIPUNCTURE: CPT

## 2025-06-05 PROCEDURE — 99284 EMERGENCY DEPT VISIT MOD MDM: CPT

## 2025-06-05 PROCEDURE — 6370000000 HC RX 637 (ALT 250 FOR IP): Performed by: EMERGENCY MEDICINE

## 2025-06-05 PROCEDURE — 84550 ASSAY OF BLOOD/URIC ACID: CPT

## 2025-06-05 PROCEDURE — 73620 X-RAY EXAM OF FOOT: CPT

## 2025-06-05 RX ORDER — OXYCODONE HYDROCHLORIDE 5 MG/1
5 TABLET ORAL
Refills: 0 | Status: COMPLETED | OUTPATIENT
Start: 2025-06-05 | End: 2025-06-05

## 2025-06-05 RX ORDER — OXYCODONE HYDROCHLORIDE 5 MG/1
5 TABLET ORAL EVERY 6 HOURS PRN
Qty: 12 TABLET | Refills: 0 | Status: SHIPPED | OUTPATIENT
Start: 2025-06-05 | End: 2025-06-08

## 2025-06-05 RX ORDER — PREDNISONE 5 MG/1
10 TABLET ORAL DAILY
Qty: 10 TABLET | Refills: 0 | Status: SHIPPED | OUTPATIENT
Start: 2025-06-05 | End: 2025-06-10

## 2025-06-05 RX ADMIN — OXYCODONE 5 MG: 5 TABLET ORAL at 10:09

## 2025-06-05 ASSESSMENT — PAIN DESCRIPTION - DESCRIPTORS: DESCRIPTORS: ACHING

## 2025-06-05 ASSESSMENT — PAIN DESCRIPTION - ORIENTATION: ORIENTATION: LEFT

## 2025-06-05 ASSESSMENT — PAIN - FUNCTIONAL ASSESSMENT: PAIN_FUNCTIONAL_ASSESSMENT: 0-10

## 2025-06-05 ASSESSMENT — PAIN SCALES - GENERAL
PAINLEVEL_OUTOF10: 8
PAINLEVEL_OUTOF10: 0

## 2025-06-05 ASSESSMENT — PAIN DESCRIPTION - LOCATION: LOCATION: FOOT

## 2025-06-05 NOTE — ED NOTES
Patient mobility status  with difficulty, uses a wheelchair .     I have reviewed discharge instructions with the patient.  The patient verbalized understanding.    Patient left ED via Discharge Method: wheelchair to Home with Spouse.    Opportunity for questions and clarification provided.     Patient given 2 scripts.           Suzy Leigh RN  06/05/25 [id number]

## 2025-06-05 NOTE — ED TRIAGE NOTES
Pt arrives in WC w/ cc of R foot pain and swelling x 4 days Pt denies any known injury. Pt hx heart surgery. Pt denies any fever, calor or erythema to affected foot. Pt A&O x 4

## 2025-06-05 NOTE — DISCHARGE INSTRUCTIONS
Elevation, wear shoes with good support, recheck with any redness/ fever /or red streaks or other changes  Check with your transplant team in regard to medications prescribed

## 2025-06-11 ASSESSMENT — ENCOUNTER SYMPTOMS
SHORTNESS OF BREATH: 0
COUGH: 0
GASTROINTESTINAL NEGATIVE: 1
BACK PAIN: 0

## 2025-06-12 NOTE — ED PROVIDER NOTES
Emergency Department Provider Note       [facility] EMERGENCY DEPT   PCP: Oswald Rodgers MD   Age: 54 y.o.   Sex: male     DISPOSITION Decision To Discharge 06/05/2025 11:46:14 AM    ICD-10-CM    1. Left foot pain  M79.672 oxyCODONE (ROXICODONE) 5 MG immediate release tablet    Atraumatic          Medical Decision Making     Atraumatic pain to the right foot.  No ischemic or infectious changes are present.  No history of trauma.  No other acute joint issues.  Patient importantly is a transplant patient of asked him to clear all medications and decision making after leaving our department with his transplant team for their input     1 acute, uncomplicated illness or injury.  Prescription drug management performed.  I independently ordered and reviewed each unique test.    I reviewed external records: Reviewed recent transplant and other care        I interpreted the X-rays x-rays show no fracture dislocation or other acute looking changes does have some mild osteoarthritis and a calcaneal spur.              History     Patient here now with approximately 4 days of foot pain.  He has tried some IcyHot with only minimal benefit.  Of significance he is a heart transplant patient who is followed and had transplant done at [facility].  Pain is on the right foot none on the left.  Recalls no trauma or injury no history of similar.  Has noticed no coolness or pallor to this area.    The history is provided by the patient and the spouse.       ROS     Review of Systems   Constitutional:  Negative for chills and fever.   Respiratory:  Negative for cough and shortness of breath.    Cardiovascular: Negative.    Gastrointestinal: Negative.    Musculoskeletal:  Positive for arthralgias. Negative for back pain, neck pain and neck stiffness.   Psychiatric/Behavioral:  Negative for confusion and decreased concentration.         Physical Exam     Vitals signs and nursing note reviewed:  Vitals:    06/05/25  normal rate and rhythm, no chest pain and no edema.

## (undated) DEVICE — DRESSING POSTOP AG PRISMASEAL 3.5X6IN

## (undated) DEVICE — INTRODUCER SHTH J TIP 0.038 IN 8 FRX13 CM 18 GA SIDEPRT BLU

## (undated) DEVICE — GLIDESHEATH SLENDER STAINLESS STEEL KIT: Brand: GLIDESHEATH SLENDER

## (undated) DEVICE — GUIDEWIRE 035IN 210CM PTFE COAT FIX COR J TIP 15MM FIRM BODY

## (undated) DEVICE — BAND COMPR L24CM REG CLR PLAS HEMSTAT EXT HK AND LOOP RETEN

## (undated) DEVICE — SUTURE ETHBND EXCEL SZ 0 L18IN NONABSORBABLE GRN L26MM MO-6 CX45D

## (undated) DEVICE — MICROPUNCTURE INTRODUCER SET SILHOUETTE TRANSITIONLESS WITH NITINOL WIRE GUIDE: Brand: MICROPUNCTURE

## (undated) DEVICE — 18G NG KIT WITH 96IN PROBE COVER (10 PK): Brand: SITE-RITE

## (undated) DEVICE — GUIDE NDL ST W/ 14 X 147CM TELESCOPICALLY FLD CIV FLX CVR

## (undated) DEVICE — SUTURE ABSORBABLE MONOFILAMENT 4-0 CV15 6 IN PUR V-LOC 90 VLOCM1203

## (undated) DEVICE — ADHESIVE SKIN CLSR 0.7ML TOP DERMBND ADV

## (undated) DEVICE — CATHETER DIAG AD 5FR L110CM 145DEG COR GRY HYDRPHLC NYL

## (undated) DEVICE — CATHETER THRMDIL 7FR L110CM STD PULM ART 4 INFUS LUMN SWN

## (undated) DEVICE — CATHETER COR DIAG 4.0 5FR 110CM 2 SIDE H

## (undated) DEVICE — DEVICE COMPR 17 CM 60CC SAFEGUARD FOCUS ADH LF

## (undated) DEVICE — SUTURE STRATAFIX SPRL SZ 3-0 L9IN ABSRB VLT FS L26MM 3/8 SXPD2B419